# Patient Record
Sex: FEMALE | Race: WHITE | NOT HISPANIC OR LATINO | URBAN - METROPOLITAN AREA
[De-identification: names, ages, dates, MRNs, and addresses within clinical notes are randomized per-mention and may not be internally consistent; named-entity substitution may affect disease eponyms.]

---

## 2017-04-07 ENCOUNTER — OFFICE VISIT - RIVER FALLS (OUTPATIENT)
Dept: FAMILY MEDICINE | Facility: CLINIC | Age: 42
End: 2017-04-07

## 2017-04-07 ASSESSMENT — MIFFLIN-ST. JEOR: SCORE: 1394.32

## 2017-11-22 ENCOUNTER — OFFICE VISIT - RIVER FALLS (OUTPATIENT)
Dept: FAMILY MEDICINE | Facility: CLINIC | Age: 42
End: 2017-11-22

## 2017-11-22 ASSESSMENT — MIFFLIN-ST. JEOR: SCORE: 1436.05

## 2017-12-05 ENCOUNTER — OFFICE VISIT - RIVER FALLS (OUTPATIENT)
Dept: FAMILY MEDICINE | Facility: CLINIC | Age: 42
End: 2017-12-05

## 2017-12-07 LAB
CHOLEST SERPL-MCNC: 228 MG/DL
CHOLEST/HDLC SERPL: 3.7 {RATIO}
CREAT SERPL-MCNC: 0.73 MG/DL (ref 0.5–1.1)
GLUCOSE BLD-MCNC: 90 MG/DL (ref 65–99)
HDLC SERPL-MCNC: 62 MG/DL
LDLC SERPL CALC-MCNC: 133 MG/DL
NONHDLC SERPL-MCNC: 166 MG/DL
TRIGL SERPL-MCNC: 193 MG/DL

## 2018-03-29 ENCOUNTER — OFFICE VISIT - RIVER FALLS (OUTPATIENT)
Dept: FAMILY MEDICINE | Facility: CLINIC | Age: 43
End: 2018-03-29

## 2018-03-29 ASSESSMENT — MIFFLIN-ST. JEOR: SCORE: 1462.36

## 2018-07-12 ENCOUNTER — OFFICE VISIT - RIVER FALLS (OUTPATIENT)
Dept: FAMILY MEDICINE | Facility: CLINIC | Age: 43
End: 2018-07-12

## 2018-09-25 ENCOUNTER — OFFICE VISIT - RIVER FALLS (OUTPATIENT)
Dept: FAMILY MEDICINE | Facility: CLINIC | Age: 43
End: 2018-09-25

## 2018-09-26 LAB
CREAT SERPL-MCNC: 0.68 MG/DL (ref 0.5–1.1)
GLUCOSE BLD-MCNC: 105 MG/DL (ref 65–99)

## 2018-10-01 ENCOUNTER — OFFICE VISIT - RIVER FALLS (OUTPATIENT)
Dept: FAMILY MEDICINE | Facility: CLINIC | Age: 43
End: 2018-10-01

## 2018-10-01 ASSESSMENT — MIFFLIN-ST. JEOR: SCORE: 1457.82

## 2019-01-15 ENCOUNTER — OFFICE VISIT - RIVER FALLS (OUTPATIENT)
Dept: FAMILY MEDICINE | Facility: CLINIC | Age: 44
End: 2019-01-15

## 2019-03-07 ENCOUNTER — OFFICE VISIT - RIVER FALLS (OUTPATIENT)
Dept: FAMILY MEDICINE | Facility: CLINIC | Age: 44
End: 2019-03-07

## 2019-03-07 ENCOUNTER — COMMUNICATION - RIVER FALLS (OUTPATIENT)
Dept: FAMILY MEDICINE | Facility: CLINIC | Age: 44
End: 2019-03-07

## 2019-03-08 LAB
A/G RATIO - HISTORICAL: 2 (ref 1–2.5)
ALBUMIN SERPL-MCNC: 4.7 GM/DL (ref 3.6–5.1)
ALP SERPL-CCNC: 59 UNIT/L (ref 33–115)
ALT SERPL W P-5'-P-CCNC: 16 UNIT/L (ref 6–29)
AST SERPL W P-5'-P-CCNC: 20 UNIT/L (ref 10–30)
BILIRUB SERPL-MCNC: 0.6 MG/DL (ref 0.2–1.2)
BUN SERPL-MCNC: 13 MG/DL (ref 7–25)
BUN/CREAT RATIO - HISTORICAL: NORMAL (ref 6–22)
CALCIUM SERPL-MCNC: 9.9 MG/DL (ref 8.6–10.2)
CHLORIDE BLD-SCNC: 105 MMOL/L (ref 98–110)
CO2 SERPL-SCNC: 26 MMOL/L (ref 20–32)
CREAT SERPL-MCNC: 0.78 MG/DL (ref 0.5–1.1)
EGFRCR SERPLBLD CKD-EPI 2021: 94 ML/MIN/1.73M2
ERYTHROCYTE [DISTWIDTH] IN BLOOD BY AUTOMATED COUNT: 12.5 % (ref 11–15)
GLOBULIN: 2.4 (ref 1.9–3.7)
GLUCOSE BLD-MCNC: 89 MG/DL (ref 65–99)
HCT VFR BLD AUTO: 43 % (ref 35–45)
HGB BLD-MCNC: 14.6 GM/DL (ref 11.7–15.5)
MCH RBC QN AUTO: 30.2 PG (ref 27–33)
MCHC RBC AUTO-ENTMCNC: 34 GM/DL (ref 32–36)
MCV RBC AUTO: 88.8 FL (ref 80–100)
PINWORM: NORMAL
PLATELET # BLD AUTO: 269 10*3/UL (ref 140–400)
PMV BLD: 11.9 FL (ref 7.5–12.5)
POTASSIUM BLD-SCNC: 4.4 MMOL/L (ref 3.5–5.3)
PROT SERPL-MCNC: 7.1 GM/DL (ref 6.1–8.1)
RBC # BLD AUTO: 4.84 10*6/UL (ref 3.8–5.1)
SODIUM SERPL-SCNC: 140 MMOL/L (ref 135–146)
TSH SERPL DL<=0.005 MIU/L-ACNC: 2.61 MIU/L
WBC # BLD AUTO: 4.8 10*3/UL (ref 3.8–10.8)

## 2019-03-12 LAB
TRICHROME #1 - QUEST: NORMAL
TRICHROME #1 - QUEST: NORMAL

## 2019-03-14 ENCOUNTER — OFFICE VISIT - RIVER FALLS (OUTPATIENT)
Dept: FAMILY MEDICINE | Facility: CLINIC | Age: 44
End: 2019-03-14

## 2019-03-15 ENCOUNTER — AMBULATORY - RIVER FALLS (OUTPATIENT)
Dept: FAMILY MEDICINE | Facility: CLINIC | Age: 44
End: 2019-03-15

## 2019-03-17 LAB
C REACTIVE PROTEIN LHE: 1.1 MG/L
CELIAC DISEASE DUAL AG SCREEN: NORMAL
ERYTHROCYTE [SEDIMENTATION RATE] IN BLOOD BY WESTERGREN METHOD: 6 MM/H
IMMUNOGLOBULIN A: 220 MG/DL (ref 81–463)
TISSUE TRANSGLUTAMINASE IGA - HISTORICAL: 1 UNIT/ML

## 2019-03-19 LAB
CAMPYLOBACTER CULTURE: NORMAL
CLOSTRIDIUM DIFFICILE TOXIN A AND B EIA-QUEST: NORMAL
SALMONELLA/SHIGELLA SCREEN: NORMAL

## 2019-10-07 ENCOUNTER — AMBULATORY - RIVER FALLS (OUTPATIENT)
Dept: FAMILY MEDICINE | Facility: CLINIC | Age: 44
End: 2019-10-07

## 2019-10-08 ENCOUNTER — OFFICE VISIT - RIVER FALLS (OUTPATIENT)
Dept: FAMILY MEDICINE | Facility: CLINIC | Age: 44
End: 2019-10-08

## 2019-10-08 ENCOUNTER — COMMUNICATION - RIVER FALLS (OUTPATIENT)
Dept: FAMILY MEDICINE | Facility: CLINIC | Age: 44
End: 2019-10-08

## 2019-10-08 LAB
CHOLEST SERPL-MCNC: 133 MG/DL
CHOLEST/HDLC SERPL: 2.3 {RATIO}
HDLC SERPL-MCNC: 58 MG/DL
LDLC SERPL CALC-MCNC: 61 MG/DL
NONHDLC SERPL-MCNC: 75 MG/DL
TRIGL SERPL-MCNC: 48 MG/DL

## 2019-10-08 ASSESSMENT — MIFFLIN-ST. JEOR: SCORE: 1262.77

## 2019-11-08 ENCOUNTER — OFFICE VISIT - RIVER FALLS (OUTPATIENT)
Dept: FAMILY MEDICINE | Facility: CLINIC | Age: 44
End: 2019-11-08

## 2019-11-08 ASSESSMENT — MIFFLIN-ST. JEOR: SCORE: 1262.77

## 2020-02-02 ENCOUNTER — OFFICE VISIT - RIVER FALLS (OUTPATIENT)
Dept: FAMILY MEDICINE | Facility: CLINIC | Age: 45
End: 2020-02-02

## 2020-02-02 ASSESSMENT — MIFFLIN-ST. JEOR: SCORE: 1235.56

## 2020-02-18 ENCOUNTER — OFFICE VISIT - RIVER FALLS (OUTPATIENT)
Dept: FAMILY MEDICINE | Facility: CLINIC | Age: 45
End: 2020-02-18

## 2020-02-18 ASSESSMENT — MIFFLIN-ST. JEOR: SCORE: 1231.93

## 2020-02-22 ENCOUNTER — OFFICE VISIT - RIVER FALLS (OUTPATIENT)
Dept: FAMILY MEDICINE | Facility: CLINIC | Age: 45
End: 2020-02-22

## 2020-02-22 ASSESSMENT — MIFFLIN-ST. JEOR: SCORE: 1231.93

## 2020-02-26 ENCOUNTER — COMMUNICATION - RIVER FALLS (OUTPATIENT)
Dept: FAMILY MEDICINE | Facility: CLINIC | Age: 45
End: 2020-02-26

## 2020-02-26 ENCOUNTER — OFFICE VISIT - RIVER FALLS (OUTPATIENT)
Dept: FAMILY MEDICINE | Facility: CLINIC | Age: 45
End: 2020-02-26

## 2020-02-26 LAB
BASOPHILS # BLD MANUAL: 0 10*3/UL
BASOPHILS NFR BLD MANUAL: 0.3 %
D DIMER PPP FEU-MCNC: 0.63
EOSINOPHIL # BLD MANUAL: 0.1 10*3/UL
EOSINOPHIL NFR BLD MANUAL: 1.1 %
ERYTHROCYTE [DISTWIDTH] IN BLOOD BY AUTOMATED COUNT: 13 % (ref 11.5–15.5)
ERYTHROCYTE [SEDIMENTATION RATE] IN BLOOD BY WESTERGREN METHOD: 6 MM/HR
HCT VFR BLD AUTO: 40.5 % (ref 33–51)
HGB BLD-MCNC: 14.2 G/DL (ref 12–16)
LYMPHOCYTES # BLD MANUAL: 2.3 10*3/UL (ref 0.9–2.9)
LYMPHOCYTES NFR BLD MANUAL: 31.8 %
MCH RBC QN AUTO: 31.3 PG (ref 26–34)
MCHC RBC AUTO-ENTMCNC: 35.1 G/DL (ref 32–36)
MCV RBC AUTO: 89 FL (ref 80–100)
MONOCYTES # BLD MANUAL: 0.5 10*3/UL
MONOCYTES NFR BLD MANUAL: 6.7 %
NEUTROPHILS # BLD MANUAL: 4.4 10*3/UL (ref 1.7–7)
NEUTROPHILS NFR BLD MANUAL: 60.1 %
PLATELET # BLD AUTO: 247 10*3/UL (ref 140–440)
PMV BLD: 10.7 FL (ref 6.5–11)
RBC # BLD AUTO: 4.53 10*6/UL (ref 4–5.2)
WBC # BLD AUTO: 7.3 10*3/UL (ref 4.5–11)

## 2020-02-26 ASSESSMENT — MIFFLIN-ST. JEOR: SCORE: 1231.93

## 2020-03-25 ENCOUNTER — OFFICE VISIT - RIVER FALLS (OUTPATIENT)
Dept: FAMILY MEDICINE | Facility: CLINIC | Age: 45
End: 2020-03-25

## 2020-05-19 ENCOUNTER — OFFICE VISIT - RIVER FALLS (OUTPATIENT)
Dept: FAMILY MEDICINE | Facility: CLINIC | Age: 45
End: 2020-05-19

## 2020-05-19 ASSESSMENT — MIFFLIN-ST. JEOR: SCORE: 1221.95

## 2020-05-20 ENCOUNTER — COMMUNICATION - RIVER FALLS (OUTPATIENT)
Dept: FAMILY MEDICINE | Facility: CLINIC | Age: 45
End: 2020-05-20

## 2020-05-20 LAB
BUN SERPL-MCNC: 17 MG/DL (ref 7–25)
BUN/CREAT RATIO - HISTORICAL: NORMAL (ref 6–22)
CALCIUM SERPL-MCNC: 9.4 MG/DL (ref 8.6–10.2)
CHLORIDE BLD-SCNC: 108 MMOL/L (ref 98–110)
CO2 SERPL-SCNC: 26 MMOL/L (ref 20–32)
CREAT SERPL-MCNC: 0.7 MG/DL (ref 0.5–1.1)
EGFRCR SERPLBLD CKD-EPI 2021: 106 ML/MIN/1.73M2
GLUCOSE BLD-MCNC: 84 MG/DL (ref 65–99)
POTASSIUM BLD-SCNC: 4 MMOL/L (ref 3.5–5.3)
SODIUM SERPL-SCNC: 142 MMOL/L (ref 135–146)

## 2020-09-30 ENCOUNTER — OFFICE VISIT - RIVER FALLS (OUTPATIENT)
Dept: FAMILY MEDICINE | Facility: CLINIC | Age: 45
End: 2020-09-30

## 2020-09-30 ASSESSMENT — MIFFLIN-ST. JEOR: SCORE: 1220.14

## 2020-12-08 ENCOUNTER — OFFICE VISIT - RIVER FALLS (OUTPATIENT)
Dept: FAMILY MEDICINE | Facility: CLINIC | Age: 45
End: 2020-12-08

## 2021-02-24 ENCOUNTER — OFFICE VISIT - RIVER FALLS (OUTPATIENT)
Dept: FAMILY MEDICINE | Facility: CLINIC | Age: 46
End: 2021-02-24

## 2021-02-24 ASSESSMENT — MIFFLIN-ST. JEOR: SCORE: 1203.81

## 2021-05-30 ENCOUNTER — RECORDS - HEALTHEAST (OUTPATIENT)
Dept: ADMINISTRATIVE | Facility: CLINIC | Age: 46
End: 2021-05-30

## 2021-07-26 ENCOUNTER — OFFICE VISIT - RIVER FALLS (OUTPATIENT)
Dept: FAMILY MEDICINE | Facility: CLINIC | Age: 46
End: 2021-07-26
Payer: COMMERCIAL

## 2021-07-26 ASSESSMENT — MIFFLIN-ST. JEOR: SCORE: 1172.05

## 2021-07-28 ENCOUNTER — COMMUNICATION - RIVER FALLS (OUTPATIENT)
Dept: FAMILY MEDICINE | Facility: CLINIC | Age: 46
End: 2021-07-28
Payer: COMMERCIAL

## 2021-07-28 ENCOUNTER — OFFICE VISIT - RIVER FALLS (OUTPATIENT)
Dept: FAMILY MEDICINE | Facility: CLINIC | Age: 46
End: 2021-07-28
Payer: COMMERCIAL

## 2021-07-28 LAB
A/G RATIO - HISTORICAL: 2 (ref 1–2.5)
ALBUMIN SERPL-MCNC: 5 GM/DL (ref 3.6–5.1)
ALP SERPL-CCNC: 57 UNIT/L (ref 31–125)
ALT SERPL W P-5'-P-CCNC: 14 UNIT/L (ref 6–29)
AST SERPL W P-5'-P-CCNC: 25 UNIT/L (ref 10–30)
BILIRUB DIRECT SERPL-MCNC: 0.2 MG/DL
BILIRUB INDIRECT SERPL-MCNC: 0.6 MG/DL (ref 0.2–1.2)
BILIRUB SERPL-MCNC: 0.8 MG/DL (ref 0.2–1.2)
BUN SERPL-MCNC: 17 MG/DL (ref 7–25)
BUN/CREAT RATIO - HISTORICAL: ABNORMAL (ref 6–22)
C REACTIVE PROTEIN LHE: <0.2 MG/L
CALCIUM SERPL-MCNC: 10.4 MG/DL (ref 8.6–10.2)
CHLORIDE BLD-SCNC: 103 MMOL/L (ref 98–110)
CO2 SERPL-SCNC: 29 MMOL/L (ref 20–32)
CREAT SERPL-MCNC: 0.8 MG/DL (ref 0.5–1.1)
EGFRCR SERPLBLD CKD-EPI 2021: 90 ML/MIN/1.73M2
ERYTHROCYTE [DISTWIDTH] IN BLOOD BY AUTOMATED COUNT: 11.7 % (ref 11–15)
ERYTHROCYTE [SEDIMENTATION RATE] IN BLOOD BY WESTERGREN METHOD: 2 MM/H
GLOBULIN: 2.5 (ref 1.9–3.7)
GLUCOSE BLD-MCNC: 98 MG/DL (ref 65–99)
HCT VFR BLD AUTO: 40.6 % (ref 35–45)
HGB BLD-MCNC: 13.8 GM/DL (ref 11.7–15.5)
MCH RBC QN AUTO: 31.7 PG (ref 27–33)
MCHC RBC AUTO-ENTMCNC: 34 GM/DL (ref 32–36)
MCV RBC AUTO: 93.1 FL (ref 80–100)
PLATELET # BLD AUTO: 222 10*3/UL (ref 140–400)
PMV BLD: 12.3 FL (ref 7.5–12.5)
POTASSIUM BLD-SCNC: 4.6 MMOL/L (ref 3.5–5.3)
PROT SERPL-MCNC: 7.5 GM/DL (ref 6.1–8.1)
RBC # BLD AUTO: 4.36 10*6/UL (ref 3.8–5.1)
SODIUM SERPL-SCNC: 139 MMOL/L (ref 135–146)
WBC # BLD AUTO: 5.2 10*3/UL (ref 3.8–10.8)

## 2021-07-28 ASSESSMENT — MIFFLIN-ST. JEOR: SCORE: 1172.05

## 2021-11-18 ENCOUNTER — OFFICE VISIT - RIVER FALLS (OUTPATIENT)
Dept: FAMILY MEDICINE | Facility: CLINIC | Age: 46
End: 2021-11-18

## 2021-11-18 ENCOUNTER — AMBULATORY - RIVER FALLS (OUTPATIENT)
Dept: FAMILY MEDICINE | Facility: CLINIC | Age: 46
End: 2021-11-18

## 2021-11-18 ENCOUNTER — LAB REQUISITION (OUTPATIENT)
Dept: LAB | Facility: CLINIC | Age: 46
End: 2021-11-18
Payer: COMMERCIAL

## 2021-11-18 DIAGNOSIS — U07.1 COVID-19: ICD-10-CM

## 2021-11-18 PROCEDURE — U0003 INFECTIOUS AGENT DETECTION BY NUCLEIC ACID (DNA OR RNA); SEVERE ACUTE RESPIRATORY SYNDROME CORONAVIRUS 2 (SARS-COV-2) (CORONAVIRUS DISEASE [COVID-19]), AMPLIFIED PROBE TECHNIQUE, MAKING USE OF HIGH THROUGHPUT TECHNOLOGIES AS DESCRIBED BY CMS-2020-01-R: HCPCS | Mod: ORL | Performed by: FAMILY MEDICINE

## 2021-11-19 LAB — SARS-COV-2 RNA RESP QL NAA+PROBE: NEGATIVE

## 2021-11-20 ENCOUNTER — COMMUNICATION - RIVER FALLS (OUTPATIENT)
Dept: FAMILY MEDICINE | Facility: CLINIC | Age: 46
End: 2021-11-20
Payer: COMMERCIAL

## 2021-11-23 LAB — SARS-COV-2 RNA RESP QL NAA+PROBE: NEGATIVE

## 2022-02-11 VITALS
WEIGHT: 130 LBS | BODY MASS INDEX: 23.04 KG/M2 | HEIGHT: 63 IN | SYSTOLIC BLOOD PRESSURE: 125 MMHG | DIASTOLIC BLOOD PRESSURE: 74 MMHG | TEMPERATURE: 97.5 F | HEART RATE: 86 BPM

## 2022-02-11 VITALS
HEART RATE: 83 BPM | SYSTOLIC BLOOD PRESSURE: 126 MMHG | WEIGHT: 172 LBS | TEMPERATURE: 96.3 F | DIASTOLIC BLOOD PRESSURE: 84 MMHG | HEIGHT: 63 IN | BODY MASS INDEX: 30.48 KG/M2

## 2022-02-12 VITALS
HEART RATE: 64 BPM | DIASTOLIC BLOOD PRESSURE: 66 MMHG | HEIGHT: 63 IN | BODY MASS INDEX: 21.79 KG/M2 | SYSTOLIC BLOOD PRESSURE: 114 MMHG | HEIGHT: 63 IN | SYSTOLIC BLOOD PRESSURE: 126 MMHG | WEIGHT: 123 LBS | BODY MASS INDEX: 21.79 KG/M2 | RESPIRATION RATE: 16 BRPM | DIASTOLIC BLOOD PRESSURE: 72 MMHG | WEIGHT: 123 LBS | OXYGEN SATURATION: 99 % | HEART RATE: 72 BPM | TEMPERATURE: 97 F

## 2022-02-12 VITALS
WEIGHT: 162.2 LBS | HEART RATE: 87 BPM | WEIGHT: 163.2 LBS | SYSTOLIC BLOOD PRESSURE: 130 MMHG | DIASTOLIC BLOOD PRESSURE: 85 MMHG | TEMPERATURE: 97.8 F | HEART RATE: 72 BPM | DIASTOLIC BLOOD PRESSURE: 78 MMHG | TEMPERATURE: 97.2 F | SYSTOLIC BLOOD PRESSURE: 122 MMHG | BODY MASS INDEX: 28.91 KG/M2 | BODY MASS INDEX: 28.73 KG/M2

## 2022-02-12 VITALS
TEMPERATURE: 97.9 F | HEART RATE: 71 BPM | HEIGHT: 63 IN | WEIGHT: 133.6 LBS | SYSTOLIC BLOOD PRESSURE: 131 MMHG | BODY MASS INDEX: 23.67 KG/M2 | DIASTOLIC BLOOD PRESSURE: 87 MMHG

## 2022-02-12 VITALS
TEMPERATURE: 97.7 F | SYSTOLIC BLOOD PRESSURE: 111 MMHG | RESPIRATION RATE: 16 BRPM | DIASTOLIC BLOOD PRESSURE: 68 MMHG | HEART RATE: 99 BPM | BODY MASS INDEX: 23.74 KG/M2 | SYSTOLIC BLOOD PRESSURE: 108 MMHG | BODY MASS INDEX: 24.13 KG/M2 | HEART RATE: 82 BPM | HEIGHT: 63 IN | WEIGHT: 136.2 LBS | HEIGHT: 63 IN | OXYGEN SATURATION: 100 % | HEART RATE: 60 BPM | BODY MASS INDEX: 24.13 KG/M2 | WEIGHT: 136.2 LBS | TEMPERATURE: 96.5 F | HEIGHT: 63 IN | WEIGHT: 134 LBS | SYSTOLIC BLOOD PRESSURE: 134 MMHG | DIASTOLIC BLOOD PRESSURE: 70 MMHG | DIASTOLIC BLOOD PRESSURE: 71 MMHG | TEMPERATURE: 97.2 F

## 2022-02-12 VITALS
OXYGEN SATURATION: 99 % | HEIGHT: 63 IN | SYSTOLIC BLOOD PRESSURE: 142 MMHG | SYSTOLIC BLOOD PRESSURE: 130 MMHG | BODY MASS INDEX: 24.13 KG/M2 | WEIGHT: 136.2 LBS | TEMPERATURE: 97.1 F | DIASTOLIC BLOOD PRESSURE: 78 MMHG | BODY MASS INDEX: 24.27 KG/M2 | HEIGHT: 63 IN | HEART RATE: 88 BPM | DIASTOLIC BLOOD PRESSURE: 78 MMHG | RESPIRATION RATE: 20 BRPM | HEART RATE: 64 BPM | WEIGHT: 137 LBS | TEMPERATURE: 99.6 F

## 2022-02-12 VITALS
TEMPERATURE: 97.7 F | RESPIRATION RATE: 16 BRPM | SYSTOLIC BLOOD PRESSURE: 154 MMHG | BODY MASS INDEX: 25.34 KG/M2 | HEART RATE: 79 BPM | HEIGHT: 63 IN | BODY MASS INDEX: 25.34 KG/M2 | DIASTOLIC BLOOD PRESSURE: 89 MMHG | SYSTOLIC BLOOD PRESSURE: 146 MMHG | WEIGHT: 143 LBS | HEART RATE: 64 BPM | DIASTOLIC BLOOD PRESSURE: 100 MMHG | WEIGHT: 143 LBS | TEMPERATURE: 98.1 F | HEIGHT: 63 IN

## 2022-02-12 VITALS
SYSTOLIC BLOOD PRESSURE: 123 MMHG | TEMPERATURE: 97.3 F | BODY MASS INDEX: 30.04 KG/M2 | DIASTOLIC BLOOD PRESSURE: 85 MMHG | HEART RATE: 75 BPM | WEIGHT: 169.6 LBS

## 2022-02-12 VITALS
HEART RATE: 99 BPM | HEIGHT: 63 IN | SYSTOLIC BLOOD PRESSURE: 131 MMHG | DIASTOLIC BLOOD PRESSURE: 87 MMHG | BODY MASS INDEX: 32.11 KG/M2 | TEMPERATURE: 97.3 F | WEIGHT: 181.2 LBS

## 2022-02-12 VITALS
BODY MASS INDEX: 32.96 KG/M2 | TEMPERATURE: 97 F | TEMPERATURE: 97.3 F | HEART RATE: 64 BPM | BODY MASS INDEX: 33.94 KG/M2 | WEIGHT: 191.6 LBS | RESPIRATION RATE: 16 BRPM | DIASTOLIC BLOOD PRESSURE: 90 MMHG | WEIGHT: 186 LBS | DIASTOLIC BLOOD PRESSURE: 104 MMHG | HEIGHT: 63 IN | HEART RATE: 74 BPM | SYSTOLIC BLOOD PRESSURE: 140 MMHG | SYSTOLIC BLOOD PRESSURE: 158 MMHG

## 2022-02-12 VITALS
SYSTOLIC BLOOD PRESSURE: 157 MMHG | WEIGHT: 181.2 LBS | BODY MASS INDEX: 32.1 KG/M2 | HEART RATE: 86 BPM | DIASTOLIC BLOOD PRESSURE: 103 MMHG

## 2022-02-12 VITALS
RESPIRATION RATE: 16 BRPM | TEMPERATURE: 97 F | WEIGHT: 187 LBS | DIASTOLIC BLOOD PRESSURE: 100 MMHG | HEIGHT: 63 IN | OXYGEN SATURATION: 98 % | SYSTOLIC BLOOD PRESSURE: 134 MMHG | HEART RATE: 80 BPM | BODY MASS INDEX: 33.13 KG/M2

## 2022-02-12 VITALS
DIASTOLIC BLOOD PRESSURE: 83 MMHG | HEART RATE: 92 BPM | SYSTOLIC BLOOD PRESSURE: 117 MMHG | TEMPERATURE: 97.9 F | BODY MASS INDEX: 32.74 KG/M2 | WEIGHT: 184.8 LBS

## 2022-02-12 VITALS — HEIGHT: 63 IN | BODY MASS INDEX: 23.74 KG/M2

## 2022-02-16 NOTE — PROGRESS NOTES
Patient:   YESSENIA RIDDLE            MRN: [id number]            FIN: [id number]               Age:   43 years     Sex:  Female     :  1975   Associated Diagnoses:   HTN (hypertension); Family history of heart attack   Author:   Augustin Chatman MD      Visit Information      Date of Service: 2018 08:37 am  Performing Location: [facility]  Encounter#: [id number]      Primary Care Provider (PCP):  DADA JAMES      Referring Provider:  Augustin Chatman MD    NPI# [id number]      Chief Complaint   2018 8:41 AM CDT    HTN check up.  Sinus infection x3 days.        History of Present Illness   chief complaint and symptoms as noted above confirmed with patient   Strong FHX heart dis  bp now NL  former smoker  Tolerating norvasc  R max pain a few days      Review of Systems   Constitutional:  Negative except as documented in history of present illness.    Ear/Nose/Mouth/Throat:  Negative except as documented in history of present illness.    Respiratory:  Negative.    Cardiovascular:  Negative.    Gastrointestinal:  Negative.    Genitourinary:  Negative.    Musculoskeletal:  Negative.    Integumentary:  Negative.    Neurologic:  Negative.       Health Status   Allergies:    Allergic Reactions (Selected)  Severity Not Documented  Metal (No reactions were documented)  No Known Medication Allergies   Medications:  (Selected)   Prescriptions  Prescribed  amLODIPine 2.5 mg oral tablet: 1 tab(s) ( 2.5 mg ), po, daily, # 90 tab(s), 1 Refill(s), Type: Maintenance, Pharmacy: [facility] [id number], 1 tab(s) Oral daily  atorvastatin 40 mg oral tablet: 1 tab(s) ( 40 mg ), po, daily, # 90 tab(s), 1 Refill(s), Type: Maintenance, Pharmacy: [facility] [id number], 1 tab(s) Oral daily  Documented Medications  Documented  Flonase 50 mcg/inh nasal spray: 1 spray(s), nasal, daily, 0 Refill(s), Type: Maintenance  Xyzal 5 mg oral tablet: 1 tab(s) ( 5 mg ), po, qpm, 0 Refill(s), Type:  Maintenance  aspirin 81 mg oral tablet: 1 tab(s) ( 81 mg ), PO, Daily, # 30 tab(s), 0 Refill(s), Type: Maintenance   Problem list:    All Problems (Selected)  Allergic rhinitis, unspecified / SNOMED CT 914270754 / Confirmed  Pain in joint involving lower leg / SNOMED CT 165115432 / Confirmed  Back problem / SNOMED CT 790254093 / Confirmed  Gastrocnemius equinus / SNOMED CT 4768666185 / Confirmed  Disorder of sacrum / SNOMED CT 50096121 / Confirmed  Sleep disturbance / SNOMED CT 62373691 / Confirmed  Dyslipidemia / SNOMED CT 6027017156 / Confirmed  Facet syndrome / SNOMED CT 779488164 / Confirmed  Family history of heart attack / SNOMED CT 602927744 / Confirmed  Hip pain / SNOMED CT 47134116 / Confirmed  Hyperlipidemia, unspecified / SNOMED CT 45877928 / Confirmed  Lipidemia / SNOMED CT 60602082 / Confirmed  Hypertension goal BP (blood pressure) < 130/80 / SNOMED CT 0798994812 / Confirmed  Knee pain / SNOMED CT 56508765 / Confirmed  Lumbago / SNOMED CT 560524622 / Confirmed  Lumbosacral neuritis / SNOMED CT 680336736 / Confirmed  Lumbosacral spondylosis / SNOMED CT 326162574 / Confirmed  Lumbosacral spondylosis without myelopathy / SNOMED CT 65232683 / Confirmed  Myalgia / SNOMED CT 105556691 / Confirmed  Myofascial pain / SNOMED CT 860097187 / Confirmed  Neuritis / SNOMED CT 740606034 / Confirmed  Nonspecific reaction to tuberculin skin test without active tuberculosis / SNOMED CT 2804098593 / Confirmed  Obesity / SNOMED CT 7506362298 / Probable  Pain in limb / SNOMED CT 339259539 / Confirmed  Leg pain / SNOMED CT 654592213 / Confirmed  Patellar tendinitis / SNOMED CT 28770926 / Confirmed  Patellofemoral syndrome / SNOMED CT 4801967567 / Confirmed  Pes anserinus bursitis / SNOMED CT 975378744 / Confirmed  Plantar fasciitis / SNOMED CT 407274254 / Confirmed  Disturbance of skin sensation / SNOMED CT 949852946 / Confirmed  Uterine prolapse / SNOMED CT 94986002 / Confirmed      Histories   Past Medical History:     Active  Hyperlipidemia, unspecified (10379188): Onset on 7/18/2013 at 38 years.  Disorder of sacrum (77212316): Onset on 1/24/2012 at 36 years.  Lumbosacral spondylosis without myelopathy (05805023): Onset on 1/3/2012 at 36 years.  Back problem (714444895): Onset on 1/3/2012 at 36 years.  Sleep disturbance (68195122): Onset on 6/27/2011 at 36 years.  Myalgia (434335671): Onset on 6/27/2011 at 36 years.  Pain in joint involving lower leg (701858628): Onset on 1/21/2011 at 35 years.  Neuritis (992445489): Onset on 12/3/2010 at 35 years.  Comments:  12/14/2017 CST 9:40 AM Leny León  Thoracic or lumbosacral  Disturbance of skin sensation (204874517): Onset on 11/23/2010 at 35 years.  Lumbago (714194277): Onset on 9/22/2010 at 35 years.  Pain in limb (168182610): Onset on 9/7/2010 at 35 years.  Nonspecific reaction to tuberculin skin test without active tuberculosis (9558729475): Onset on 1/29/2010 at 35 years.  Allergic rhinitis, unspecified (939581727)  Patellar tendinitis (62608369)  Pes anserinus bursitis (352360848)  Knee pain (80673473)  Hip pain (74880650)  Facet syndrome (585335649)  Comments:  12/14/2017 CST 9:50 AM Leny León  Lumbar  Lumbosacral spondylosis (635768874)  Leg pain (414529877)  Patellofemoral syndrome (0130112594)  Gastrocnemius equinus (8123350025)  Dyslipidemia (2619476144)  Lumbosacral neuritis (927627055)  Myofascial pain (191129725)  Plantar fasciitis (888883807)  Uterine prolapse (38107323)  Resolved  Chronic sinusitis, unspecified (42044882): Onset on 2/16/2010 at 35 years.  Resolved.  Other Disorders of Menstruation and Other Abnormal Bleeding from Female Genital Tract (626.8):  Resolved.  Uterine Prolapse without Mention of Vaginal Wall Prolapse (618.1):  Resolved.  Pregnancy:  Resolved.  Tobacco Use Disorder (305.1):  Resolved.  Pilonidal cyst with abscess (489920923):  Resolved.  Pregnancy (891318860):  Resolved in 2004 at 28 years.  Pregnancy (490059661):  Resolved in  2005 at 29 years.  History of tobacco use (1067048401):  Resolved.  Dysfunctional uterine bleeding (28619052):  Resolved.  Pilonidal cyst with abscess (752337600):  Resolved.   Family History:    Cardiac pacemaker  Father (Jacobo)  Comments:  2017 10:17 AM - Leny Cherry  Irregular heart beat  Diabetes mellitus  Mother (Bianka, )  Heart disease  Father (Jacobo)  Comments:  2017 10:38 AM - Leny Cherry  Bypass surgery at the age of 40.    2017 10:16 AM - Leny Cherry  Irregular heart beat  Grandfather (P)  Comments:  2017 10:35 AM - Leny Cherry  Heart attack at the age of 50.  Grandmother (P) ()  Comments:  2017 10:35 AM - Leny Cherry  Heart attack at the age of 30.  Mother (Bianka, )  Comments:  2017 10:16 AM - Leny Cherry  Bypass graft  High blood pressure  Father (Jacobo)  Mother (Bianka, )  CA - Cancer  Mother (Bianka, )  MS - Multiple sclerosis  Sister  Mother (Bianka, )  Aunt (M)  Colon Polyps  Mother (Bianka, )  Kidney Disease  Mother (Bianka, )  Comments:  2017 10:37 AM - Leny Cherry  Kidney failure  Colon cancer  Grandfather (M) ()  Great Uncle (P) ()  Great Grandfather (M)     Procedure history:    Cardiac computed tomography for calcium scoring (SNOMED CT 2628867092) on 2017 at 42 Years.  Comments:  2017 1:58 PM - Shantel Pizano  Total Agatston calcium score is 8.  THR - Total hip replacement (SNOMED CT 721333919) in  at 39 Years.  Comments:  2017 10:58 AM - Onelia Espino  left  Pilonidal cyst (SNOMED CT 1547180871) on 2012 at 37 Years.  Comments:  2017 10:41 AM - Leny Cherry  Excision.  No abscess.  Nerve conduction study (SNOMED CT 67114153) on 2010 at 35 Years.  Cystoscopy (SNOMED CT 57637418) on 2009 at 34 Years.  Hysterectomy (SNOMED CT 363738807) on 2007 at 32 Years.  Comments:  2017 10:44 RONAK - Leny Cherry  Right salpingo-oophorectomy.    Social History:        Alcohol Assessment            1 drink every 3 months or more.      Tobacco Assessment            Former smoker, quit more than 30 days ago, Cigarettes, Started age 13 Years.  Stopped age 33 Years.      Employment and Education Assessment            Employed, Work/School description: CMA.            Employed, Work/School description: CMA.      Exercise and Physical Activity Assessment            Exercise frequency: 2-3 x per wk..        Physical Examination   Vital Signs   7/12/2018 8:41 AM CDT Temperature Tympanic 97.9 DegF    Peripheral Pulse Rate 92 bpm    HR Method Electronic    Systolic Blood Pressure 117 mmHg    Diastolic Blood Pressure 83 mmHg  HI    Mean Arterial Pressure 94 mmHg    BP Method Electronic      Measurements from flowsheet : Measurements   7/12/2018 8:41 AM CDT    Weight Measured - Standard                184.8 lb     General:  Alert and oriented, No acute distress.    Eye:  Normal conjunctiva.    HENT:  Tympanic membranes are clear, No pharyngeal erythema.    Neck:  Supple, Non-tender.    Respiratory:  Lungs are clear to auscultation, Respirations are non-labored.    Cardiovascular:  Normal rate, Regular rhythm.    Gastrointestinal:  Soft, Non-tender, No organomegaly.    Neurologic:  Alert, Oriented.       Health Maintenance      Recommendations     Pending (in the next year)        Due            HIV Screen (if sexually active) (Female) due  07/12/18  and every 1  year(s)           STD Counseling (if sexually active) (Female) due  07/12/18  and every 1  year(s)           Syphilis Screen (if sexually active) (Female) due  07/12/18  and every 1  year(s)           Type 2 Diabetes Mellitus Screen (Female) due  07/12/18  Variable frequency        Due In Future            Alcohol Misuse Screen (Female) not due until  11/22/18  and every 1  year(s)           Depression Screen (Female) not due until  11/22/18  and every 1  year(s)           Lipid Disorders Screen (Female) not  due until  12/05/18  and every 1  year(s)           Body Mass Index Check (Female) not due until  03/29/19  and every 1  year(s)     Satisfied (in the past 1 year)        Satisfied            Alcohol Misuse Screen (Female) on  11/22/17.           Body Mass Index Check (Female) on  03/29/18.           Body Mass Index Check (Female) on  11/22/17.           Depression Screen (Female) on  11/22/17.           Depression Screen (Female) on  11/22/17.           Depression Screen (Female) on  11/22/17.           High Blood Pressure Screen (Female) on  07/12/18.           High Blood Pressure Screen (Female) on  03/29/18.           High Blood Pressure Screen (Female) on  12/05/17.           High Blood Pressure Screen (Female) on  12/05/17.           High Blood Pressure Screen (Female) on  11/22/17.           Lipid Disorders Screen (Female) on  12/05/17.           Lipid Disorders Screen (Female) on  12/05/17.           Lipid Disorders Screen (Female) on  12/05/17.           Lipid Disorders Screen (Female) on  12/05/17.           Obesity Screen and Counseling (Female) on  07/12/18.           Obesity Screen and Counseling (Female) on  03/29/18.           Obesity Screen and Counseling (Female) on  12/05/17.           Obesity Screen and Counseling (Female) on  11/22/17.           Tobacco Use Screen (Female) on  07/12/18.           Tobacco Use Screen (Female) on  03/29/18.           Tobacco Use Screen (Female) on  11/22/17.        Impression and Plan   Diagnosis     HTN (hypertension) (AVR29-YH I10).     Family history of heart attack (YHY84-BE Z82.49).     Course:  Improving.    Plan:  cont asa 81 and lipitor  no change amlodipine for now  bp monthly  rech 6 mo  flonase  .    Patient Instructions:       Counseled: Patient, Regarding diagnosis, Regarding treatment, Regarding medications, Diet, Activity.

## 2022-02-16 NOTE — TELEPHONE ENCOUNTER
Entered by Dallas Rodriges CMA on January 29, 2019 11:54:05 AM CST  PCP:   CORN[facility]    Medication:   ondansetron 8mg tabs  Last Filled:  1-21-19    Quantity:  12  Refills:  0    Date of last office visit and reason:   1-15-19 abd pain with TFS    Date of last Med Check / Px:     Date of last labs pertaining to condition:      Note:  Please advise on refills.  Dallas Rodriges CMA    Return to Clinic order placed?  no    Resource:   _  Phone:   _  Fax:  _          ------------------------------------------  From: [facility] [id number]  To: Roberto Pérez PA-C  Sent: January 29, 2019 11:21:07 AM CST  Subject: Medication Management  Due: January 30, 2019 11:21:07 AM CST    ** On Hold Pending Signature **  Drug: ondansetron (ondansetron 8 mg oral tablet, disintegrating)  1 TAB(S) ORAL Q8 HRS,PRN:FOR NAUSEA/VOMITING  Quantity: 12 tab(s)     Days Supply: 0         Refills: 0  Substitutions Allowed  Notes from Pharmacy:     Dispensed Drug: ondansetron (ondansetron 8 mg oral tablet, disintegrating)  DISSOLVE 1 TABLET ON THE TONGUE EVERY 8 HOURS AS NEEDED FOR NAUSEA OR VOMITING  Quantity: 12 tab(s)     Days Supply: 4         Refills: 0  Substitutions Allowed  Notes from Pharmacy:   ---------------------------------------------------------------  From: Dallas Rodriges CMA (eRx Pool ([id number]_[facility]))   To: Augustin Chatman MD;     Sent: 1/29/2019 11:54:15 AM CST  Subject: FW: Medication Management   Due Date/Time: 1/30/2019 11:21:00 AM CST---------------------  From: Augustin Chatman MD   To: [facility] [id number]    Sent: 1/29/2019 1:39:07 PM CST  Subject: FW: Medication Management     ** Submitted: **  Complete:ondansetron (Zofran ODT 8 mg oral tablet, disintegrating)   Signed by Augustin Chatman MD  1/29/2019 1:39:00 PM    ** Approved **  ondansetron (ONDANSETRON ODT 8MG TABLETS)  DISSOLVE 1 TABLET ON THE TONGUE EVERY 8 HOURS AS NEEDED FOR NAUSEA OR VOMITING  Qty:  12 tab(s)        Days Supply:  4         Refills:  0          YOHANNES     Route To Pharmacy - [facility] [id number]   Signed by Augustin Chatman MD

## 2022-02-16 NOTE — TELEPHONE ENCOUNTER
---------------------  From: Meredith Kaminski LPN (Phone Messages Pool ([id number]_[facility]))   To: TFS Message Pool ([id number]_[place] [facility] [place]);     Sent: 7/28/2021 8:25:42 AM CDT  Subject: update/pain med request     FYI    Phone Message    PCP:   CORNEL      Time of Call:  7:39am       Person Calling:  pt  Phone number:  [phone]    Returned call at: 8:20am    Note:   Pt LM stating she had seen [facility] because TFS was gone a few days ago. Pt says she ended up at [facility] because she was having bad head, neck and shoulder pain.    Pt says she was diagnosed with a meningioma. Pt says she has an upcoming appt with neurosurgeon to have it removed. Pt says when she was released she was asked if she wanted any pain medication and she said no- pt says now she is struggling and hoping to get something for the pain.    Pt asking if TFS can prescribe something for her.    Returned call and informed pt she would need appt to discuss pain medication. Pt transferred to scheduling.    Last office visit and reason:  7/26/21 giant cell arteritis with DWGpt's appt completed

## 2022-02-16 NOTE — PROGRESS NOTES
Patient:   YESSENIA RIDDLE            MRN: [id number]            FIN: [id number]               Age:   45 years     Sex:  Female     :  1975   Associated Diagnoses:   HTN (hypertension); Family history of heart attack; Hyperlipidemia, unspecified; Myofascial pain   Author:   Augustin Chatman MD      Visit Information      Date of Service: 2020 10:16 am  Performing Location: [facility]  Encounter#: [id number]      Primary Care Provider (PCP):  Augustin Chatman MD    NPI# [id number]      Referring Provider:  Augustin Chatman MD# [id number]      Chief Complaint   2020 10:23 AM CDT   Pt here for chronic disease visit for HTN and cholesterol management.  Pt is fasting today.        History of Present Illness   chief complaint and symptoms as noted above confirmed with patient   Patient here for six-month follow-up overall she has been doing well.  On medications as directed for her hypertension hyperlipidemia and her chronic neck pain.  Her gabapentin is been working well she takes it 3 times a day and uses her muscle relaxant as needed.  She is kept her 50 pounds off which she is been very pleased with.         Review of Systems   Constitutional:  Negative except as documented in history of present illness.    Ear/Nose/Mouth/Throat:  Negative.    Respiratory:  Negative.    Cardiovascular:  Negative.    Gastrointestinal:  Negative.    Genitourinary:  Negative.    Musculoskeletal:  Negative.    Integumentary:  Negative.    Neurologic:  Negative.       Health Status   Allergies:    Allergic Reactions (Selected)  Severity Not Documented  Metal (No reactions were documented)  No Known Medication Allergies   Medications:  (Selected)   Prescriptions  Prescribed  amLODIPine 5 mg oral tablet: = 1 tab(s) ( 5 mg ), Oral, daily, # 90 tab(s), 1 Refill(s), Type: Maintenance, Pharmacy: [id number], 1 tab(s) Oral daily  atorvastatin 40 mg oral tablet: = 1 tab(s), Oral, daily, # 90  tab(s), 0 Refill(s), Type: Maintenance, Pharmacy: [facility] #[id number]  gabapentin 300 mg oral capsule: = 1 cap(s) ( 300 mg ), Oral, tid, Instructions: take 1 po qhs day 1  then 1 po bid day 2  and then 1 po tid, # 90 cap(s), 1 Refill(s), Type: Maintenance, Pharmacy: [facility] #[id number], 1 cap(s) Oral tid,Instr:take 1 po qhs day 1; then 1 po bid...  tiZANidine 4 mg oral tablet: 2 tab(s), Oral, q8 hrs, PRN: AS NEEDED FOR MUSCLE PAIN, # 60 tab(s), Type: Soft Stop, Pharmacy: [facility] #[id number]  Documented Medications  Documented  Fish Oil 1000 mg oral capsule: = 1 cap(s) ( 1,000 mg ), Oral, daily, 0 Refill(s), Type: Maintenance  aspirin 81 mg oral tablet: 1 tab(s) ( 81 mg ), PO, Daily, # 30 tab(s), 0 Refill(s), Type: Maintenance,    Medications          *denotes recorded medication          amLODIPine 5 mg oral tablet: 5 mg, 1 tab(s), Oral, daily, 90 tab(s), 1 Refill(s).          *aspirin 81 mg oral tablet: 81 mg, 1 tab(s), PO, Daily, 30 tab(s), 0 Refill(s).          atorvastatin 40 mg oral tablet: 1 tab(s), Oral, daily, 90 tab(s), 0 Refill(s).          gabapentin 300 mg oral capsule: 300 mg, 1 cap(s), Oral, tid, take 1 po qhs day 1  then 1 po bid day 2  and then 1 po tid, 90 cap(s), 1 Refill(s).          *Fish Oil 1000 mg oral capsule: 1,000 mg, 1 cap(s), Oral, daily, 0 Refill(s).          tiZANidine 4 mg oral tablet: 2 tab(s), Oral, q8 hrs, PRN: AS NEEDED FOR MUSCLE PAIN, 60 tab(s).       Problem list:    All Problems (Selected)  Abdominal pain / SNOMED CT 20815510 / Confirmed  Allergic rhinitis, unspecified / SNOMED CT 742081317 / Confirmed  Gastrocnemius equinus / SNOMED CT 2876664333 / Confirmed  Sleep disturbance / SNOMED CT 87407605 / Confirmed  Family history of heart attack / SNOMED CT 678501997 / Confirmed  GERD without esophagitis / SNOMED CT 8073642917 / Confirmed  Hyperlipidemia, unspecified / SNOMED CT 36272742 / Confirmed  Hypertension goal BP (blood pressure) < 130/80 / SNOMED CT  7424490952 / Confirmed  Lumbosacral neuritis / SNOMED CT 657494676 / Confirmed  Lumbosacral spondylosis without myelopathy / SNOMED CT 67466322 / Confirmed  Myofascial pain / SNOMED CT 067001464 / Confirmed  Nonspecific reaction to tuberculin skin test without active tuberculosis / SNOMED CT 8247669512 / Confirmed  Obesity / SNOMED CT 1398576092 / Probable      Histories   Past Medical History:    Active  Hyperlipidemia, unspecified (17104740): Onset on 7/18/2013 at 38 years.  Lumbosacral spondylosis without myelopathy (96471596): Onset on 1/3/2012 at 36 years.  Sleep disturbance (16166408): Onset on 6/27/2011 at 36 years.  Nonspecific reaction to tuberculin skin test without active tuberculosis (6983788280): Onset on 1/29/2010 at 35 years.  Allergic rhinitis, unspecified (217640733)  Gastrocnemius equinus (1605273498)  Lumbosacral neuritis (221894475)  Myofascial pain (505026905)  Resolved  Disorder of sacrum (51420967): Onset on 1/24/2012 at 36 years.  Resolved.  Back problem (540843293): Onset on 1/3/2012 at 36 years.  Resolved.  Myalgia (563170972): Onset on 6/27/2011 at 36 years.  Resolved.  Pain in joint involving lower leg (732955299): Onset on 1/21/2011 at 35 years.  Resolved.  Neuritis (682269221): Onset on 12/3/2010 at 35 years.  Resolved.  Comments:  12/14/2017 CST 9:40 AM CST - Leny Cherry  Thoracic or lumbosacral  Disturbance of skin sensation (626402225): Onset on 11/23/2010 at 35 years.  Resolved.  Lumbago (662534640): Onset on 9/22/2010 at 35 years.  Resolved.  Pain in limb (178268242): Onset on 9/7/2010 at 35 years.  Resolved.  Chronic sinusitis, unspecified (35862504): Onset on 2/16/2010 at 35 years.  Resolved.  Other Disorders of Menstruation and Other Abnormal Bleeding from Female Genital Tract (626.8):  Resolved.  Uterine Prolapse without Mention of Vaginal Wall Prolapse (618.1):  Resolved.  Pregnancy:  Resolved.  Tobacco Use Disorder (305.1):  Resolved.  Patellar tendinitis (88860610):   Resolved.  Pes anserinus bursitis (225277462):  Resolved.  Pilonidal cyst with abscess (878442900):  Resolved.  Pregnancy (960777734):  Resolved in  at 28 years.  Pregnancy (329897441):  Resolved in  at 29 years.  Lipidemia (99890596):  Resolved.  Knee pain (90135813):  Resolved.  Hip pain (90653790):  Resolved on 3/7/2019 at 44 years.  Facet syndrome (876138384):  Resolved.  Comments:  2017 CST 9:50 AM CST - Leny Cherry  Lumbar  Lumbosacral spondylosis (364608068):  Resolved.  Leg pain (674352905):  Resolved.  Patellofemoral syndrome (5501274533):  Resolved.  Dyslipidemia (7899542816):  Resolved.  Plantar fasciitis (651060391):  Resolved.  History of tobacco use (5657604651):  Resolved.  Dysfunctional uterine bleeding (50193803):  Resolved.  Pilonidal cyst with abscess (495271315):  Resolved.  Uterine prolapse (05915305):  Resolved.  H. pylori infection (7332780710):  Resolved.   Family History:    Cardiac pacemaker  Father (Jacobo)  Comments:  2017 10:17 AM CST - Leny Cherry  Irregular heart beat  Diabetes mellitus  Mother (Bianka, )  Heart disease  Father (Jacobo)  Comments:  2017 10:38 AM CST Leny Francisco  Bypass surgery at the age of 40.    2017 10:16 AM CST - Leny Cherry  Irregular heart beat  Grandfather (P)  Comments:  2017 10:35 AM CST - Leny Cherry  Heart attack at the age of 50.  Grandmother (P) ()  Comments:  2017 10:35 AM CST Leny Francisco  Heart attack at the age of 30.  Mother (Bianka, )  Comments:  2017 10:16 AM CST Dotty Franciscoe  Bypass graft  High blood pressure  Father (Jacobo)  Mother (Bianka, )  CA - Cancer  Mother (Bianka, )  MS - Multiple sclerosis  Sister  Mother (Bianka, )  Aunt (M)  Colon Polyps  Mother (Bianka, )  Kidney Disease  Mother (Bianka, )  Comments:  2017 10:37 AM NIKKIE - Leny Cherry  Kidney failure  Colon cancer  Grandfather (M) ()  Great Uncle (P)  ()  Great Grandfather (M)     Procedure history:    Cardiac computed tomography for calcium scoring (SNOMED CT 6916338250) on 2017 at 42 Years.  Comments:  2017 1:58 PM CST - Shantel Pizano  Total Agatston calcium score is 8.  THR - Total hip replacement (SNOMED CT 480975810) in  at 39 Years.  Comments:  2017 10:58 AM CST - [place]  Onelia  left  Pilonidal cyst (SNOMED CT 7573666095) on 2012 at 37 Years.  Comments:  2017 10:41 AM CST - Leny Cherry  Excision.  No abscess.  Nerve conduction study (SNOMED CT 35116288) on 2010 at 35 Years.  Cystoscopy (SNOMED CT 56835117) on 2009 at 34 Years.  Hysterectomy (SNOMED CT 177510865) on 2007 at 32 Years.  Comments:  2017 10:44 AM NIKKIE - Leny Cherry  Right salpingo-oophorectomy.   Social History:        Alcohol Assessment            1 drink every 3 months or more.      Tobacco Assessment            Former smoker, quit more than 30 days ago, Cigarettes, Started age 13 Years.  Stopped age 33 Years.      Employment and Education Assessment            Employed, Work/School description: [employer].            Employed, Work/School description: [employer].      Exercise and Physical Activity Assessment            Exercise frequency: 2-3 x per wk..        Physical Examination   Vital Signs   2020 10:23 AM CDT Temperature Tympanic 96.5 DegF  LOW    Peripheral Pulse Rate 60 bpm    Pulse Site Radial artery    Respiratory Rate 16 br/min    Systolic Blood Pressure 108 mmHg    Diastolic Blood Pressure 68 mmHg    Mean Arterial Pressure 81 mmHg    BP Site Right arm      Measurements from flowsheet : Measurements   2020 10:23 AM CDT Height Measured - Standard 63 in    Weight Measured - Standard 134 lb    BSA 1.64 m2    Body Mass Index 23.73 kg/m2      General:  Alert and oriented, No acute distress.    Neck:  Supple, Non-tender.    Respiratory:  Lungs are clear to auscultation, Respirations are non-labored.    Cardiovascular:  Normal  rate, Regular rhythm.    Gastrointestinal:  Soft, Non-tender, No organomegaly.    Neurologic:  Alert, Oriented.       Health Maintenance      Recommendations     Pending (in the next year)        Due            Type 2 Diabetes Mellitus Screen (Female) due  05/19/20  Variable frequency        Due In Future            Influenza Vaccine not due until  08/31/20  and every 1  year(s)           Lipid Disorders Screen (Female) not due until  10/07/20  and every 1  year(s)     Satisfied (in the past 1 year)        Satisfied            Alcohol Misuse Screen (Female) on  05/19/20.           Body Mass Index Check (Female) on  05/19/20.           Body Mass Index Check (Female) on  02/26/20.           Body Mass Index Check (Female) on  02/22/20.           Body Mass Index Check (Female) on  02/18/20.           Body Mass Index Check (Female) on  02/02/20.           Body Mass Index Check (Female) on  11/08/19.           Body Mass Index Check (Female) on  10/08/19.           Depression Screen (Female) on  05/19/20.           Depression Screen (Female) on  05/19/20.           Depression Screen (Female) on  05/19/20.           High Blood Pressure Screen (Female) on  05/19/20.           High Blood Pressure Screen (Female) on  02/26/20.           High Blood Pressure Screen (Female) on  02/22/20.           High Blood Pressure Screen (Female) on  02/18/20.           High Blood Pressure Screen (Female) on  02/02/20.           High Blood Pressure Screen (Female) on  11/08/19.           High Blood Pressure Screen (Female) on  10/08/19.           Influenza Vaccine on  10/08/19.           Lipid Disorders Screen (Female) on  10/07/19.           Lipid Disorders Screen (Female) on  10/07/19.           Lipid Disorders Screen (Female) on  10/07/19.           Lipid Disorders Screen (Female) on  10/07/19.           Obesity Screen and Counseling (Female) on  05/19/20.           Obesity Screen and Counseling (Female) on  02/26/20.           Obesity  Screen and Counseling (Female) on  02/22/20.           Obesity Screen and Counseling (Female) on  02/18/20.           Obesity Screen and Counseling (Female) on  02/02/20.           Obesity Screen and Counseling (Female) on  11/08/19.           Obesity Screen and Counseling (Female) on  10/08/19.           Tobacco Use Screen (Female) on  05/19/20.           Tobacco Use Screen (Female) on  02/18/20.           Tobacco Use Screen (Female) on  11/08/19.           Tobacco Use Screen (Female) on  10/08/19.        Canceled            HIV Screen (if sexually active) (Female) on  05/18/20.           STD Counseling (If sexually active) (Female) on  05/18/20.           Syphilis Screen (if sexually active) (Female) on  05/18/20.          Impression and Plan   Diagnosis     HTN (hypertension) (LNT94-NK I10).     Family history of heart attack (FEH81-IX Z82.49).     Hyperlipidemia, unspecified (NBM52-MS E78.5).     Myofascial pain (NCX54-TN M79.18).     Course:  Progressing as expected.    Plan:  Hypertension controlled amlodipine.  Her chronic neck and myofascial pain is controlled with gabapentin and occasional muscle relaxant.  Lipids are clear controlled with atorvastatin.  Continue to be active continue with working on her weight.  We did review the current pandemic she will see his back in 6 months    rech 6 mo   .    Patient Instructions:       Counseled: Patient, Regarding diagnosis, Regarding treatment, Regarding medications, Diet, Activity.

## 2022-02-16 NOTE — NURSING NOTE
Comprehensive Intake Entered On:  5/19/2020 10:29 AM CDT    Performed On:  5/19/2020 10:23 AM CDT by Dallas Rodriges CMA               Summary   Chief Complaint :   Pt here for chronic disease visit for HTN and cholesterol management.  Pt is fasting today.   Weight Measured :   134 lb(Converted to: 134 lb 0 oz, 60.78 kg)    Height Measured :   63 in(Converted to: 5 ft 3 in, 160.02 cm)    Body Mass Index :   23.73 kg/m2   Body Surface Area :   1.64 m2   Systolic Blood Pressure :   108 mmHg   Diastolic Blood Pressure :   68 mmHg   Mean Arterial Pressure :   81 mmHg   Peripheral Pulse Rate :   60 bpm   BP Site :   Right arm   Pulse Site :   Radial artery   Temperature Tympanic :   96.5 DegF(Converted to: 35.8 DegC)  (LOW)    Respiratory Rate :   16 br/min   Dallas Rodriges CMA - 5/19/2020 10:23 AM CDT   Health Status   Allergies Verified? :   Yes   Medication History Verified? :   Yes   Medical History Verified? :   Yes   Pre-Visit Planning Status :   Completed   Tobacco Use? :   Former smoker   Dallas Rodriges CMA - 5/19/2020 10:23 AM CDT   Meds / Allergies   (As Of: 5/19/2020 10:29:20 AM CDT)   Allergies (Active)   Metal  Estimated Onset Date:   Unspecified ; Created By:   Leny Cherry; Reaction Status:   Active ; Category:   Other ; Substance:   Metal ; Type:   Allergy ; Updated By:   Leny Cherry; Reviewed Date:   5/19/2020 10:26 AM CDT      No Known Medication Allergies  Estimated Onset Date:   Unspecified ; Created By:   Rere Case; Reaction Status:   Active ; Category:   Drug ; Substance:   No Known Medication Allergies ; Type:   Allergy ; Updated By:   Rere Case; Reviewed Date:   5/19/2020 10:26 AM CDT        Medication List   (As Of: 5/19/2020 10:29:20 AM CDT)   Prescription/Discharge Order    predniSONE  :   predniSONE ; Status:   Processing ; Ordered As Mnemonic:   predniSONE 10 mg oral tablet ; Ordering Provider:   Augustin Chatman MD; Action Display:   Complete ; Catalog Code:   predniSONE ;  Order Dt/Tm:   5/19/2020 10:26:04 AM CDT          tiZANidine 4 mg oral tablet  :   tiZANidine 4 mg oral tablet ; Status:   Prescribed ; Ordered As Mnemonic:   tiZANidine 4 mg oral tablet ; Simple Display Line:   2 tab(s), Oral, q8 hrs, PRN: AS NEEDED FOR MUSCLE PAIN, 60 tab(s) ; Ordering Provider:   Roberto Pérez PA-C; Catalog Code:   tiZANidine ; Order Dt/Tm:   5/12/2020 9:52:14 AM CDT          gabapentin  :   gabapentin ; Status:   Prescribed ; Ordered As Mnemonic:   gabapentin 300 mg oral capsule ; Simple Display Line:   300 mg, 1 cap(s), Oral, tid, take 1 po qhs day 1  then 1 po bid day 2  and then 1 po tid, 90 cap(s), 1 Refill(s) ; Ordering Provider:   Roberto Pérez PA-C; Catalog Code:   gabapentin ; Order Dt/Tm:   3/25/2020 2:56:28 PM CDT          atorvastatin  :   atorvastatin ; Status:   Prescribed ; Ordered As Mnemonic:   atorvastatin 40 mg oral tablet ; Simple Display Line:   1 tab(s), Oral, daily, 90 tab(s), 0 Refill(s) ; Ordering Provider:   Augustin Chatman MD; Catalog Code:   atorvastatin ; Order Dt/Tm:   3/19/2020 7:48:35 AM CDT          amLODIPine  :   amLODIPine ; Status:   Prescribed ; Ordered As Mnemonic:   amLODIPine 5 mg oral tablet ; Simple Display Line:   5 mg, 1 tab(s), Oral, daily, 90 tab(s), 1 Refill(s) ; Ordering Provider:   Augustin Chatman MD; Catalog Code:   amLODIPine ; Order Dt/Tm:   10/8/2019 5:03:21 PM CDT            Home Meds    omega-3 polyunsaturated fatty acids  :   omega-3 polyunsaturated fatty acids ; Status:   Documented ; Ordered As Mnemonic:   Fish Oil 1000 mg oral capsule ; Simple Display Line:   1,000 mg, 1 cap(s), Oral, daily, 0 Refill(s) ; Catalog Code:   omega-3 polyunsaturated fatty acids ; Order Dt/Tm:   9/25/2018 5:25:20 PM CDT          aspirin  :   aspirin ; Status:   Documented ; Ordered As Mnemonic:   aspirin 81 mg oral tablet ; Simple Display Line:   81 mg, 1 tab(s), PO, Daily, 30 tab(s), 0 Refill(s) ; Catalog Code:   aspirin ; Order Dt/Tm:   7/12/2018  8:41:06 AM CDT            ID Risk Screen   Recent Travel History :   No recent travel   Family Member Travel History :   No recent travel   Other Exposure to Infectious Disease :   Unknown   Dallas Rodriges CMA - 5/19/2020 10:23 AM CDT   Social History   Social History   (As Of: 5/19/2020 10:29:20 AM CDT)   Alcohol:        1 drink every 3 months or more.   (Last Updated: 11/24/2017 11:00:46 AM CST by Onelia Espino)          Tobacco:        Former smoker, quit more than 30 days ago, Cigarettes, Started age 13 Years.  Stopped age 33 Years.   (Last Updated: 11/22/2017 2:19:46 PM CST by Rere Case)          Employment/School:        Employed, Work/School description: [employer].   (Last Updated: 11/24/2017 10:50:50 AM CST by Onelia Espino)   Employed, Work/School description: [employer].   (Last Updated: 11/24/2017 10:55:17 AM CST by Onelia Espino)          Exercise:        Exercise frequency: 2-3 x per wk..   (Last Updated: 11/24/2017 11:01:10 AM CST by Onelia Espino)

## 2022-02-16 NOTE — PROGRESS NOTES
"   Patient:   YESSENIA RIDDLE            MRN: [id number]            FIN: [id number]               Age:   44 years     Sex:  Female     :  1975   Associated Diagnoses:   Rhomboid muscle strain; Strain of left trapezius muscle   Author:   Roberto Pérez PA-C      Chief Complaint   2019 4:40 PM CST    Pt here for pain \"across both shoulder\" that radiates into neck and head x 2 weeks.  Pt states the pain is getting worse in left shoulder and into neck x 3 days.      History of Present Illness   Chief complaint and symptoms noted above and confirmed with patient   as above, no acute trauma or injury  pain started at base of neck on left side, now radiating into head causing headaches and into left shoulder, pain under shoulder blade  now having pain with head rotation  pain coming down into left upper arm  has tried ibuprofen and excedrin, and icy hot patch  has tried massage without relief           Review of Systems   Constitutional:  Negative.    Ear/Nose/Mouth/Throat:  Negative.    Respiratory:  Negative.       Health Status   Allergies:    Allergic Reactions (All)  Severity Not Documented  Metal (No reactions were documented)  No Known Medication Allergies  Canceled/Inactive Reactions (All)  Severity Not Documented  No known allergies (No reactions were documented)   Medications:  (Selected)   Prescriptions  Prescribed  amLODIPine 5 mg oral tablet: = 1 tab(s) ( 5 mg ), Oral, daily, # 90 tab(s), 1 Refill(s), Type: Maintenance, Pharmacy: [facility] #[id number], 1 tab(s) Oral daily  atorvastatin 40 mg oral tablet: = 1 tab(s), Oral, daily, # 90 tab(s), 1 Refill(s), Type: Maintenance, Pharmacy: [facility] #[id number], 1 tab(s) Oral daily  Documented Medications  Documented  Fish Oil 1000 mg oral capsule: = 1 cap(s) ( 1,000 mg ), Oral, daily, 0 Refill(s), Type: Maintenance  aspirin 81 mg oral tablet: 1 tab(s) ( 81 mg ), PO, Daily, # 30 tab(s), 0 Refill(s), Type: Maintenance   Problem list:    All Problems " (Selected)  Allergic rhinitis, unspecified / SNOMED CT 316732068 / Confirmed  Lumbosacral neuritis / SNOMED CT 410685241 / Confirmed  Hypertension goal BP (blood pressure) < 130/80 / SNOMED CT 0687973158 / Confirmed  Obesity / SNOMED CT 0007086192 / Probable  GERD without esophagitis / SNOMED CT 7779860392 / Confirmed  Nonspecific reaction to tuberculin skin test without active tuberculosis / SNOMED CT 3486606636 / Confirmed  Gastrocnemius equinus / SNOMED CT 0814511032 / Confirmed  Abdominal pain / SNOMED CT 96909881 / Confirmed  Family history of heart attack / SNOMED CT 466307267 / Confirmed  Myofascial pain / SNOMED CT 724924149 / Confirmed  Lumbosacral spondylosis without myelopathy / SNOMED CT 12349758 / Confirmed  Sleep disturbance / SNOMED CT 25628458 / Confirmed  Hyperlipidemia, unspecified / SNOMED CT 81695624 / Confirmed      Histories   Past Medical History:    Active  Hyperlipidemia, unspecified (30058446): Onset on 7/18/2013 at 38 years.  Lumbosacral spondylosis without myelopathy (36742876): Onset on 1/3/2012 at 36 years.  Sleep disturbance (07574181): Onset on 6/27/2011 at 36 years.  Nonspecific reaction to tuberculin skin test without active tuberculosis (6131828082): Onset on 1/29/2010 at 35 years.  Allergic rhinitis, unspecified (516654948)  Gastrocnemius equinus (8237495728)  Lumbosacral neuritis (059297214)  Myofascial pain (513015708)  Resolved  Disorder of sacrum (15562440): Onset on 1/24/2012 at 36 years.  Resolved.  Back problem (341058736): Onset on 1/3/2012 at 36 years.  Resolved.  Myalgia (414151798): Onset on 6/27/2011 at 36 years.  Resolved.  Pain in joint involving lower leg (389641546): Onset on 1/21/2011 at 35 years.  Resolved.  Neuritis (950073531): Onset on 12/3/2010 at 35 years.  Resolved.  Comments:  12/14/2017 CST 9:40 AM CST - Leny Cherry  Thoracic or lumbosacral  Disturbance of skin sensation (657575473): Onset on 11/23/2010 at 35 years.  Resolved.  Lumbago (284830844): Onset  on 2010 at 35 years.  Resolved.  Pain in limb (360000987): Onset on 2010 at 35 years.  Resolved.  Chronic sinusitis, unspecified (83239116): Onset on 2010 at 35 years.  Resolved.  Other Disorders of Menstruation and Other Abnormal Bleeding from Female Genital Tract (626.8):  Resolved.  Uterine Prolapse without Mention of Vaginal Wall Prolapse (618.1):  Resolved.  Pregnancy:  Resolved.  Tobacco Use Disorder (305.1):  Resolved.  Patellar tendinitis (69225684):  Resolved.  Pes anserinus bursitis (938776283):  Resolved.  Pilonidal cyst with abscess (240528277):  Resolved.  Pregnancy (041681772):  Resolved in  at 28 years.  Pregnancy (593728795):  Resolved in  at 29 years.  Lipidemia (76447621):  Resolved.  Knee pain (96273762):  Resolved.  Hip pain (79926912):  Resolved on 3/7/2019 at 44 years.  Facet syndrome (834183246):  Resolved.  Comments:  2017 CST 9:50 AM Leny León  Lumbar  Lumbosacral spondylosis (523455400):  Resolved.  Leg pain (319170896):  Resolved.  Patellofemoral syndrome (5017133208):  Resolved.  Dyslipidemia (4323674215):  Resolved.  Plantar fasciitis (204534936):  Resolved.  History of tobacco use (2782622109):  Resolved.  Dysfunctional uterine bleeding (60188011):  Resolved.  Pilonidal cyst with abscess (840138468):  Resolved.  Uterine prolapse (51032838):  Resolved.  H. pylori infection (5949894229):  Resolved.   Family History:    Cardiac pacemaker  Father (Jacobo)  Comments:  2017 10:17 AM Leny León  Irregular heart beat  Diabetes mellitus  Mother (Bianka, )  Heart disease  Father (Jacobo)  Comments:  2017 10:38 AM Leny León  Bypass surgery at the age of 40.    2017 10:16 AM Leny León  Irregular heart beat  Grandfather (P)  Comments:  2017 10:35 AM Leny León  Heart attack at the age of 50.  Grandmother (P) ()  Comments:  2017 10:35 AM CST - Leny Cherry  Heart attack at the age of  30.  Mother (Bianka, )  Comments:  2017 10:16 AM CST - Leny Cherry  Bypass graft  High blood pressure  Father (Jacobo)  Mother (Bianka, )  CA - Cancer  Mother (Bianka, )  MS - Multiple sclerosis  Sister  Mother (Bianka, )  Aunt (M)  Colon Polyps  Mother (Bianka, )  Kidney Disease  Mother (Bianka, )  Comments:  2017 10:37 AM NIKKIE - Leny Cherry  Kidney failure  Colon cancer  Grandfather (M) ()  Great Uncle (P) ()  Great Grandfather (M)     Procedure history:    Cardiac computed tomography for calcium scoring (1605613189) on 2017 at 42 Years.  Comments:  2017 1:58 PM NIKKIE - Shantel Pizano  Total Agatston calcium score is 8.  THR - Total hip replacement (671124107) in  at 39 Years.  Comments:  2017 10:58 AM NIKKIE - Onelia Espino  left  Pilonidal cyst (5033381881) on 2012 at 37 Years.  Comments:  2017 10:41 AM NIKKIE - Leny Cherry  Excision.  No abscess.  Nerve conduction study (44654797) on 2010 at 35 Years.  Cystoscopy (62476285) on 2009 at 34 Years.  Hysterectomy (329074503) on 2007 at 32 Years.  Comments:  2017 10:44 AM Leny León  Right salpingo-oophorectomy.      Physical Examination   Vital Signs   2019 4:40 PM CST Temperature Tympanic 98.1 DegF    Peripheral Pulse Rate 64 bpm    Pulse Site Radial artery    Respiratory Rate 16 br/min    Systolic Blood Pressure 154 mmHg  HI    Diastolic Blood Pressure 100 mmHg  HI    Mean Arterial Pressure 118 mmHg    BP Site Right arm      Measurements from flowsheet : Measurements   2019 4:40 PM CST Height Measured - Standard 63 in    Weight Measured - Standard 143 lb    BSA 1.7 m2    Body Mass Index 25.33 kg/m2  HI      General:  Mild distress.    HENT:  Tympanic membranes are clear, No pharyngeal erythema, No sinus tenderness.    Neck:  Supple, No lymphadenopathy, pain with lateral bending to left and rotation to left, pain over cervical spine  and left trapezius,  pain over left shoulder blade and rhomboid.       Impression and Plan   Diagnosis     Rhomboid muscle strain (DDN63-TT S29.012A).     Strain of left trapezius muscle (KHJ53-NH S46.812A).     Summary:  will treat with cyclobenzaprine, medrol dosepak, and tramadol (mostly qhs), try lidocaine patch  she did have a peptic ulcer last year and will try to avoid excessive NSAIDs  follow up if not improving   .    Orders     Orders   Pharmacy:  traMADol 50 mg oral tablet (Prescribe): = 1 tab(s) ( 50 mg ), Oral, q4-6 hrs, PRN: for pain, # 30 tab(s), 0 Refill(s), Type: Maintenance, Pharmacy: [facility] #[id number], 1 tab(s) Oral q4-6 hrs,PRN:for pain  Medrol Dosepak 4 mg oral tablet (Prescribe): = 1 packet(s), Oral, once, Instructions: as directed on package labeling, # 21 tab(s), 0 Refill(s), Type: Soft Stop, Pharmacy: [facility] #[id number], 1 packet(s) Oral once,Instr:as directed on package labeling  cyclobenzaprine 10 mg oral tablet (Prescribe): = 1 tab(s) ( 10 mg ), PO, q8 hrs, PRN: for spasm, # 30 tab(s), 0 Refill(s), Type: Maintenance, Pharmacy: [facility] #[id number], 1 tab(s) Oral q8 hrs,PRN:for spasm.     Orders   Charges (Evaluation and Management):  13212 office outpatient visit 15 minutes (Charge) (Order): Quantity: 1, Strain of left trapezius muscle  Rhomboid muscle strain.

## 2022-02-16 NOTE — TELEPHONE ENCOUNTER
---------------------  From: Indira Jean Baptiste LPN (Phone Messages Pool ([id number]_[facility]))   To: [facility] Message Pool ([id number]_[place] [facility] [place]);     Sent: 3/17/2020 9:42:36 AM CDT  Subject: General Message       Phone Message Template      PCP:   CORNEL     Time of Call:  0815       Person Calling:  pt  Phone number:  [phone]    Returned call at: _    Note:   Patient canceled follow up physical therapy appt was wondering if [facility]FS could do a MRI of her neck. Patient also requested a refill of cyclobenzaprine. She stated that she will make a follow up appt later in time.   Last office visit and reason:  02/26/20, chest pain---------------------  From: July Garnett MA ([facility]FS Message Pool ([id number]_[facility]))   To: Augustin Chatman MD;     Sent: 3/17/2020 11:36:51 AM CDT  Subject: FW: General Message---------------------  From: Augustin Chatman MD   To: [facility] Message Pool ([id number]_[place] [facility] [place]);     Sent: 3/17/2020 11:47:29 AM CDT  Subject: RE: General Message     ok for mri cerv spine for cervical radiculopathy  ok to ref cyclobenz  ** Submitted: **  Order:MRI Cervical Spine (Request)  W/O CONTRAST  Details:  Instructions: W/O CONTRAST, Acute cervical radiculopathy         Signed by July Garnett MA  3/17/2020 5:38:00 PM    ** Submitted: **  Order:cyclobenzaprine (cyclobenzaprine 10 mg oral tablet)  1 tab(s)  PO  q8 hrs  Qty:  30 tab(s)        Refills:  3          Substitutions Allowed     PRN  for spasm      Route To Pharmacy - [facility] #[id number]    Signed by July Garnett MA  3/17/2020 5:38:00 PMLM for pt re: TFS recommendation.

## 2022-02-16 NOTE — LETTER
(Inserted Image. Unable to display)   [address] [place] [address]  July 28, 2021      YESSENIA RIDDLE      [address] [place] [address]        Dear YESSENIA,    Thank you for selecting [facility] for your healthcare needs.  Below you will find the results of you recent tests done at our clinic.     All of these labs are acceptable.      Result Name Current Result Previous Result Reference Range   Sodium Level (mmol/L)  139 7/26/2021  142 5/19/2020 135 - 146   Potassium Level (mmol/L)  4.6 7/26/2021  4.0 5/19/2020 3.5 - 5.3   Chloride Level (mmol/L)  103 7/26/2021  108 5/19/2020 98 - 110   CO2 Level (mmol/L)  29 7/26/2021  26 5/19/2020 20 - 32   Glucose Level (mg/dL)  98 7/26/2021  84 5/19/2020 65 - 99   BUN (mg/dL)  17 7/26/2021  17 5/19/2020 7 - 25   Creatinine Level (mg/dL)  0.80 7/26/2021  0.70 5/19/2020 0.50 - 1.10   eGFR (mL/min/1.73m2)  90 7/26/2021  106 5/19/2020 > OR = 60 -    Calcium Level (mg/dL) ((H)) 10.4 7/26/2021  9.4 5/19/2020 8.6 - 10.2   Bilirubin Total (mg/dL)  0.8 7/26/2021  0.2 - 1.2   Bilirubin Direct (mg/dL)  0.2 7/26/2021   - < OR = 0.2   Bilirubin Indirect  0.6 7/26/2021  0.2 - 1.2   Alkaline Phosphatase (unit/L)  57 7/26/2021  31 - 125   AST/SGOT (unit/L)  25 7/26/2021  10 - 30   ALT/SGPT (unit/L)  14 7/26/2021  6 - 29   Protein Total (gm/dL)  7.5 7/26/2021  6.1 - 8.1   Albumin Level (gm/dL)  5.0 7/26/2021  3.6 - 5.1   Globulin  2.5 7/26/2021  1.9 - 3.7   A/G Ratio  2.0 7/26/2021  1.0 - 2.5   C-Reactive Protein (CRP) (mg/L)  <0.2 7/26/2021   - <8.0   WBC  5.2 7/26/2021  7.3 2/26/2020 3.8 - 10.8   RBC  4.36 7/26/2021  4.53 2/26/2020 3.80 - 5.10   Hgb (gm/dL)  13.8 7/26/2021  14.2 2/26/2020 11.7 - 15.5   Hct (%)  40.6 7/26/2021  40.5 2/26/2020 35.0 - 45.0   MCV (fL)  93.1 7/26/2021  89 2/26/2020 80.0 - 100.0   MCH (pg)  31.7 7/26/2021  31.3 2/26/2020 27.0 - 33.0   MCHC (gm/dL)  34.0 7/26/2021  35.1 2/26/2020 32.0 - 36.0   RDW (%)  11.7  7/26/2021  13.0 2/26/2020 11.0 - 15.0   Platelet  222 7/26/2021  247 2/26/2020 140 - 400   MPV (fL)  12.3 7/26/2021  10.7 2/26/2020 7.5 - 12.5   Sed Rate  2 7/26/2021  6 2/26/2020  - < OR = 20       Please contact me or my assistant at [phone] if you have any questions.     Sincerely,        Roberto Pérez PA-C    What do your labs mean?  Below is a glossary of commonly ordered labs:  LDL   Bad Cholesterol   HDL   Good Cholesterol  AST/ALT   Liver Function   Cr/Creatinine   Kidney Function  Microalbumin   Kidney Function  BUN   Kidney Function  PSA   Prostate    TSH   Thyroid Hormone  HgbA1c   Diabetes Test   Hgb (Hemoglobin)   Red Blood Cells

## 2022-02-16 NOTE — PROGRESS NOTES
Patient:   YESSENIA RIDDLE            MRN: [id number]            FIN: [id number]               Age:   43 years     Sex:  Female     :  1975   Associated Diagnoses:   Abdominal pain; GERD without esophagitis   Author:   Roberto Pérez PA-C      Chief Complaint   10/1/2018 11:31 AM CDT   Pt here for FU on right sided abd pain from .  Pt states she is still nauseas and having right sided back pain and still having right sided abd pain.  Pt states she is also having urinary frequency since last visit.      History of Present Illness   18:  RUQ pain  Decreased appetite  1 emesis yesterday  no fever  No stool change or gu co  worse 30min after food     10/1/18: Chief complaint as above reviewed with patient. RUQ US and CT Abdomen/Pelvis normal. UA/UC normal.   Increased abdominal pain with laying flat, sleeps propped up.  Not able to eat anything except for baby food secondary to nausea and dry heaving. Continues to hydrate with water and ginger ale.   No history of UTIs, increased urinary frequency. No hematuria or dysuria.   Admits to history of infrequent bowel movements (about once a week, has been hospitalized for this) and these have been happening more regularly (once a day) over the past 4 months but have contained mucus. Recently cut dairy, tolerates soy milk. Last BM x 1 week ago.       Review of Systems   Constitutional:  Negative.    Eye:  Negative.    Ear/Nose/Mouth/Throat:  Negative.    Respiratory:  Negative.    Cardiovascular:  Negative.    Gastrointestinal:  Negative except as documented in history of present illness.    Genitourinary:  Negative except as documented in history of present illness.    Musculoskeletal:  Negative except as documented in history of present illness.    Integumentary:  Negative.    Neurologic:  Negative.    Psychiatric:  Negative.       Health Status   Allergies:    Allergic Reactions (Selected)  Severity Not Documented  Metal (No reactions were documented)  No Known  Medication Allergies   Medications:  (Selected)   Prescriptions  Prescribed  Zofran ODT 4 mg oral tablet, disintegrating: = 1 tab(s) ( 4 mg ), Oral, tid, PRN: for nausea/vomiting, # 10 EA, 0 Refill(s), Type: Maintenance, Pharmacy: [facility] [id number], 1 tab(s) Oral tid,PRN:for nausea/vomiting  amLODIPine 2.5 mg oral tablet: 1 tab(s) ( 2.5 mg ), po, daily, # 90 tab(s), 1 Refill(s), Type: Maintenance, Pharmacy: [facility] [id number], 1 tab(s) Oral daily  atorvastatin 40 mg oral tablet: 1 tab(s) ( 40 mg ), po, daily, # 90 tab(s), 1 Refill(s), Type: Maintenance, Pharmacy: [facility] [id number], 1 tab(s) Oral daily  traMADol 50 mg oral tablet: = 1 tab(s) ( 50 mg ), PO, q4hr, PRN: for pain, # 10 tab(s), 0 Refill(s), Type: Maintenance  Documented Medications  Documented  Fish Oil 1000 mg oral capsule: = 1 cap(s) ( 1,000 mg ), Oral, daily, 0 Refill(s), Type: Maintenance  Flonase 50 mcg/inh nasal spray: 1 spray(s), nasal, daily, 0 Refill(s), Type: Maintenance  Xyzal 5 mg oral tablet: 1 tab(s) ( 5 mg ), po, qpm, 0 Refill(s), Type: Maintenance  aspirin 81 mg oral tablet: 1 tab(s) ( 81 mg ), PO, Daily, # 30 tab(s), 0 Refill(s), Type: Maintenance   Problem list:    All Problems (Selected)  Allergic rhinitis, unspecified / SNOMED CT 570468682 / Confirmed  Pain in joint involving lower leg / SNOMED CT 426599763 / Confirmed  Back problem / SNOMED CT 223575047 / Confirmed  Gastrocnemius equinus / SNOMED CT 9322306072 / Confirmed  Disorder of sacrum / SNOMED CT 58903696 / Confirmed  Sleep disturbance / SNOMED CT 59089045 / Confirmed  Dyslipidemia / SNOMED CT 6918951365 / Confirmed  Facet syndrome / SNOMED CT 711181229 / Confirmed  Family history of heart attack / SNOMED CT 775187880 / Confirmed  Hip pain / SNOMED CT 12057460 / Confirmed  Hyperlipidemia, unspecified / SNOMED CT 34691586 / Confirmed  Lipidemia / SNOMED CT 61608261 / Confirmed  Hypertension goal BP (blood pressure) < 130/80 / SNOMED CT 2019651461 /  Confirmed  Knee pain / SNOMED CT 68879612 / Confirmed  Lumbago / SNOMED CT 987944008 / Confirmed  Lumbosacral neuritis / SNOMED CT 536715124 / Confirmed  Lumbosacral spondylosis / SNOMED CT 681847137 / Confirmed  Lumbosacral spondylosis without myelopathy / SNOMED CT 79763657 / Confirmed  Myalgia / SNOMED CT 370468202 / Confirmed  Myofascial pain / SNOMED CT 641897017 / Confirmed  Neuritis / SNOMED CT 108939898 / Confirmed  Nonspecific reaction to tuberculin skin test without active tuberculosis / SNOMED CT 0855097432 / Confirmed  Obesity / SNOMED CT 3500343865 / Probable  Pain in limb / SNOMED CT 680753285 / Confirmed  Leg pain / SNOMED CT 787193598 / Confirmed  Patellar tendinitis / SNOMED CT 97103076 / Confirmed  Patellofemoral syndrome / SNOMED CT 3096976119 / Confirmed  Pes anserinus bursitis / SNOMED CT 235178317 / Confirmed  Plantar fasciitis / SNOMED CT 449036075 / Confirmed  Disturbance of skin sensation / SNOMED CT 716472041 / Confirmed  Uterine prolapse / SNOMED CT 36006849 / Confirmed      Histories   Past Medical History:    Active  Hyperlipidemia, unspecified (64496424): Onset on 7/18/2013 at 38 years.  Disorder of sacrum (60466185): Onset on 1/24/2012 at 36 years.  Lumbosacral spondylosis without myelopathy (64474066): Onset on 1/3/2012 at 36 years.  Back problem (258691627): Onset on 1/3/2012 at 36 years.  Sleep disturbance (08968561): Onset on 6/27/2011 at 36 years.  Myalgia (176282646): Onset on 6/27/2011 at 36 years.  Pain in joint involving lower leg (928549329): Onset on 1/21/2011 at 35 years.  Neuritis (543834209): Onset on 12/3/2010 at 35 years.  Comments:  12/14/2017 CST 9:40 AM CST - Leny Cherry  Thoracic or lumbosacral  Disturbance of skin sensation (455917483): Onset on 11/23/2010 at 35 years.  Lumbago (693213394): Onset on 9/22/2010 at 35 years.  Pain in limb (602705030): Onset on 9/7/2010 at 35 years.  Nonspecific reaction to tuberculin skin test without active tuberculosis (4869856529):  Onset on 2010 at 35 years.  Allergic rhinitis, unspecified (934408975)  Patellar tendinitis (94852356)  Pes anserinus bursitis (850549276)  Knee pain (49048566)  Hip pain (03072142)  Facet syndrome (951407015)  Comments:  2017 CST 9:50 AM CST - Leny Cherry  Lumbar  Lumbosacral spondylosis (764350103)  Leg pain (947695467)  Patellofemoral syndrome (4294514765)  Gastrocnemius equinus (8770795922)  Dyslipidemia (7632559993)  Lumbosacral neuritis (056182800)  Myofascial pain (803708976)  Plantar fasciitis (715824937)  Uterine prolapse (40691491)  Resolved  Chronic sinusitis, unspecified (58591029): Onset on 2010 at 35 years.  Resolved.  Other Disorders of Menstruation and Other Abnormal Bleeding from Female Genital Tract (626.8):  Resolved.  Uterine Prolapse without Mention of Vaginal Wall Prolapse (618.1):  Resolved.  Pregnancy:  Resolved.  Tobacco Use Disorder (305.1):  Resolved.  Pilonidal cyst with abscess (758570907):  Resolved.  Pregnancy (050477110):  Resolved in  at 28 years.  Pregnancy (835294113):  Resolved in  at 29 years.  History of tobacco use (1148852044):  Resolved.  Dysfunctional uterine bleeding (94285401):  Resolved.  Pilonidal cyst with abscess (238842643):  Resolved.   Family History:    Cardiac pacemaker  Father (Jacobo)  Comments:  2017 10:17 AM - Leny Cherry  Irregular heart beat  Diabetes mellitus  Mother (Bianka, )  Heart disease  Father (Jacobo)  Comments:  2017 10:38 AM - Leny Cherry  Bypass surgery at the age of 40.    2017 10:16 AM - Leny Cherry  Irregular heart beat  Grandfather (P)  Comments:  2017 10:35 AM - Leny Cherry  Heart attack at the age of 50.  Grandmother (P) ()  Comments:  2017 10:35 AM - Leny Cherry  Heart attack at the age of 30.  Mother (Bianka, )  Comments:  2017 10:16 AM - Leny Cherry  Bypass graft  High blood pressure  Father (Jacobo)  Mother (Bianka, )  CA - Cancer  Mother (Bianka,  )  MS - Multiple sclerosis  Sister  Mother (Bianka, )  Aunt (M)  Colon Polyps  Mother (Bianka, )  Kidney Disease  Mother (Bianka, )  Comments:  2017 10:37 AM - Leny Cherry  Kidney failure  Colon cancer  Grandfather (M) ()  Great Uncle (P) ()  Great Grandfather (M)     Procedure history:    Cardiac computed tomography for calcium scoring (9932161516) on 2017 at 42 Years.  Comments:  2017 1:58 PM - Shantel Pizano  Total Agatston calcium score is 8.  THR - Total hip replacement (714018410) in  at 39 Years.  Comments:  2017 10:58 AM - Onelia Espino  left  Pilonidal cyst (5382589098) on 2012 at 37 Years.  Comments:  2017 10:41 AM - Leny Cherry  Excision.  No abscess.  Nerve conduction study (16869092) on 2010 at 35 Years.  Cystoscopy (13181397) on 2009 at 34 Years.  Hysterectomy (511351971) on 2007 at 32 Years.  Comments:  2017 10:44 AM - Leny Cherry  Right salpingo-oophorectomy.   Social History:        Alcohol Assessment            1 drink every 3 months or more.      Tobacco Assessment            Former smoker, quit more than 30 days ago, Cigarettes, Started age 13 Years.  Stopped age 33 Years.      Employment and Education Assessment            Employed, Work/School description: [employer].            Employed, Work/School description: CMA.      Exercise and Physical Activity Assessment            Exercise frequency: 2-3 x per wk..        Physical Examination   Vital Signs   10/1/2018 11:31 AM CDT Temperature Tympanic 97 DegF  LOW    Peripheral Pulse Rate 64 bpm    Pulse Site Radial artery    Respiratory Rate 16 br/min    Systolic Blood Pressure 140 mmHg  HI    Diastolic Blood Pressure 90 mmHg  HI    Mean Arterial Pressure 107 mmHg    BP Site Right arm      Measurements from flowsheet : Measurements   10/1/2018 11:31 AM CDT Height Measured - Standard 63 in    Weight Measured - Standard 186 lb    BSA 1.93 m2    Body Mass  Index 32.94 kg/m2  HI      General:  No acute distress.    Eye:  Pupils are equal, round and reactive to light, Extraocular movements are intact.    HENT:  Tympanic membranes are clear, No pharyngeal erythema, Tacky mucous membranes. .    Neck:  Supple, Non-tender, No lymphadenopathy.    Respiratory:  Lungs are clear to auscultation.    Cardiovascular:  Normal rate, Regular rhythm, No murmur.    Gastrointestinal:  Soft, Non-distended, Hypoactive bowel sounds. RUQ and epigastric tenderness to palpation..    Genitourinary:  Left CVA tenderness..    Musculoskeletal:  Normal range of motion, Normal gait.    Integumentary:  Warm, Dry, No rash.    Neurologic:  Alert, Oriented.    Psychiatric:  Appropriate mood & affect.       Review / Management   Results review:  Lab results   10/1/2018 12:17 PM CDT UA pH 5.5    UA Specific Gravity > OR = 1.030    UA Glucose NEGATIVE    UA Bilirubin NEGATIVE    UA Ketones NEGATIVE    Urine Occult Blood TRACE    UA Protein NEGATIVE    UA Nitrite NEGATIVE    UA Leukocyte Esterase NEGATIVE   .         Interpretation: will also get a urine culture, patient will be informed if culture results in a change of therapy.    Course:  she is given some zofran and then a GI cocktail, she gets some relief from that.       Impression and Plan   Diagnosis     Abdominal pain (HPG36-GV R10.9).     GERD without esophagitis (JBA11-IL K21.9).     Summary:  will treat with omeprazole bid for a month and then use qd for the next month, can use Maalox/mylanta prn GI irritation, follow up if not improving.    Orders     Orders   Lab (Gen Lab  Reference Lab):  Helicobacter pylori Urea Breath Test, Infra-red* ([facility]) (Order): Specimen Type: Breath, Collection Date: 10/1/2018 12:04 PM CDT  Culture, Urine, Routine* ([facility]) (Order): Specimen Type: Urine (Clean Catch), Collection Date: 10/1/2018 12:04 PM CDT  POC URINALYSIS, UA* ([facility]) (Order): Specimen Type: Urine, Collection Date: 10/1/2018 12:04 PM CDT.      Orders   Pharmacy:  omeprazole 20 mg oral delayed release tablet (Prescribe): = 1 tab(s) ( 20 mg ), Oral, bid, Instructions: use bid for one month and then daily for 1-2 months, # 60 tab(s), 1 Refill(s), Type: Maintenance, Pharmacy: [facility] [id number], 1 tab(s) Oral bid,Instr:use bid for one month and then daily for 1-2 months.     Orders   Charges (Evaluation and Management):  47178 office outpatient visit 25 minutes (Charge) (Order): Quantity: 1, GERD without esophagitis  Abdominal pain.

## 2022-02-16 NOTE — TELEPHONE ENCOUNTER
---------------------  From: Stephany Dugan CMA (eRx Pool ([id number]_[facility]))   To: [facility] Message Pool ([id number]);     Sent: 9/15/2020 4:00:50 PM CDT  Subject: FW: Medication Management   Due Date/Time: 9/14/2020 10:04:00 AM CDT     Medication Refill needing approval    PCP:   [facility]      Last Filled:  8/18/20    Quantity:  60  Refills:  0  CSA on file?   _     Date of last office visit and reason:   5/19/20 HTN  Date of last labs pertaining to condition:  5/19/20    Note:  Please advise if ok for refill    Return to Clinic order placed?  Yes due visit in Nov          ------------------------------------------  From: [employer] #[id number]  To: Augustin Chatman MD  Sent: September 13, 2020 10:04:25 AM CDT  Subject: Medication Management  Due: September 9, 2020 4:21:06 PM CDT     ** On Hold Pending Signature **     Dispensed Drug: tiZANidine (tiZANidine 4 mg oral tablet), TAKE 2 TABLETS BY MOUTH EVERY 8 HOURS AS NEEDED FOR MUSCLE PAIN  Quantity: 60 tab(s)  Days Supply: 10  Refills: 0  Substitutions Allowed  Notes from Pharmacy:  ---------------------------------------------------------------  From: Rere Case ([facility] Message Pool ([id number]_[facility]))   To: Augustin Chatman MD;     Sent: 9/15/2020 5:29:25 PM CDT  Subject: FW: Medication Management   Due Date/Time: 9/14/2020 10:04:00 AM CDT---------------------  From: Augustin Chatman MD   To: [employer] #[id number]    Sent: 9/15/2020 6:09:23 PM CDT  Subject: FW: Medication Management     ** Submitted: **  Complete:tiZANidine (tiZANidine 4 mg oral tablet)   Signed by Augustin Chatman MD  9/15/2020 11:09:00 PM [facility]    ** Approved **  tiZANidine (TIZANIDINE 4MG TABLETS)  TAKE 2 TABLETS BY MOUTH EVERY 8 HOURS AS NEEDED FOR MUSCLE PAIN  Qty:  60 tab(s)        Days Supply:  10        Refills:  0          Substitutions Allowed     Route To Pharmacy - [facility] #[id number]

## 2022-02-16 NOTE — NURSING NOTE
Comprehensive Intake Entered On:  10/8/2019 4:43 PM CDT    Performed On:  10/8/2019 4:41 PM CDT by Rere Case               Summary   Chief Complaint :   HTN check.  Also bilateral ear pain.    Weight Measured :   143.0 lb(Converted to: 143 lb 0 oz, 64.86 kg)    Height Measured :   63 in(Converted to: 5 ft 3 in, 160.02 cm)    Body Mass Index :   25.33 kg/m2 (HI)    Body Surface Area :   1.7 m2   Systolic Blood Pressure :   146 mmHg (HI)    Diastolic Blood Pressure :   89 mmHg (HI)    Mean Arterial Pressure :   108 mmHg   Peripheral Pulse Rate :   79 bpm   BP Method :   Electronic   HR Method :   Electronic   Temperature Tympanic :   97.7 DegF(Converted to: 36.5 DegC)  (LOW)    Reer Case - 10/8/2019 4:41 PM CDT   Health Status   Allergies Verified? :   Yes   Medication History Verified? :   Yes   Pre-Visit Planning Status :   Completed   Tobacco Use? :   Former smoker   Rere Case - 10/8/2019 4:41 PM CDT   Meds / Allergies   (As Of: 10/8/2019 4:43:14 PM CDT)   Allergies (Active)   Metal  Estimated Onset Date:   Unspecified ; Created By:   Leny Cherry; Reaction Status:   Active ; Category:   Other ; Substance:   Metal ; Type:   Allergy ; Updated By:   Leny Cherry; Reviewed Date:   1/15/2019 6:43 PM CST      No Known Medication Allergies  Estimated Onset Date:   Unspecified ; Created By:   Rere Case; Reaction Status:   Active ; Category:   Drug ; Substance:   No Known Medication Allergies ; Type:   Allergy ; Updated By:   Rere Case; Reviewed Date:   1/15/2019 6:43 PM CST        Medication List   (As Of: 10/8/2019 4:43:14 PM CDT)   Prescription/Discharge Order    atorvastatin  :   atorvastatin ; Status:   Prescribed ; Ordered As Mnemonic:   atorvastatin 40 mg oral tablet ; Simple Display Line:   1 tab(s), Oral, daily, 30 tab(s), 0 Refill(s) ; Ordering Provider:   Augustin Chatman MD; Catalog Code:   atorvastatin ; Order Dt/Tm:   8/30/2019 11:42:43 AM CDT          amLODIPine  :    amLODIPine ; Status:   Prescribed ; Ordered As Mnemonic:   amLODIPine 2.5 mg oral tablet ; Simple Display Line:   1 tab(s), Oral, daily, 30 tab(s), 0 Refill(s) ; Ordering Provider:   Augustin Chatman MD; Catalog Code:   amLODIPine ; Order Dt/Tm:   8/30/2019 11:42:08 AM CDT          omeprazole  :   omeprazole ; Status:   Processing ; Ordered As Mnemonic:   omeprazole 20 mg oral delayed release capsule ; Ordering Provider:   Augustin Chatman MD; Action Display:   Complete ; Catalog Code:   omeprazole ; Order Dt/Tm:   10/8/2019 4:42:11 PM CDT          ondansetron  :   ondansetron ; Status:   Processing ; Ordered As Mnemonic:   ondansetron 8 mg oral tablet, disintegrating ; Ordering Provider:   Augustin Chatman MD; Action Display:   Complete ; Catalog Code:   ondansetron ; Order Dt/Tm:   10/8/2019 4:42:23 PM CDT            Home Meds    omega-3 polyunsaturated fatty acids  :   omega-3 polyunsaturated fatty acids ; Status:   Documented ; Ordered As Mnemonic:   Fish Oil 1000 mg oral capsule ; Simple Display Line:   1,000 mg, 1 cap(s), Oral, daily, 0 Refill(s) ; Catalog Code:   omega-3 polyunsaturated fatty acids ; Order Dt/Tm:   9/25/2018 5:25:20 PM CDT          aspirin  :   aspirin ; Status:   Documented ; Ordered As Mnemonic:   aspirin 81 mg oral tablet ; Simple Display Line:   81 mg, 1 tab(s), PO, Daily, 30 tab(s), 0 Refill(s) ; Catalog Code:   aspirin ; Order Dt/Tm:   7/12/2018 8:41:06 AM CDT

## 2022-02-16 NOTE — NURSING NOTE
Comprehensive Intake Entered On:  2/24/2021 2:03 PM CST    Performed On:  2/24/2021 2:00 PM CST by Rere Case               Summary   Chief Complaint :   Left leg pain, concern for blood clot.   Rere Case - 2/24/2021 2:15 PM CST     Weight Measured :   130.0 lb(Converted to: 130 lb 0 oz, 58.967 kg)    Height Measured :   63 in(Converted to: 5 ft 3 in, 160.02 cm)    Body Mass Index :   23.03 kg/m2   Body Surface Area :   1.62 m2   Systolic Blood Pressure :   125 mmHg   Diastolic Blood Pressure :   74 mmHg   Mean Arterial Pressure :   91 mmHg   Peripheral Pulse Rate :   86 bpm   BP Method :   Electronic   HR Method :   Electronic   Temperature Tympanic :   97.5 DegF(Converted to: 36.4 DegC)  (LOW)    Rere Case - 2/24/2021 2:00 PM CST   Health Status   Allergies Verified? :   Yes   Medication History Verified? :   Yes   Pre-Visit Planning Status :   Completed   Tobacco Use? :   Former smoker   Rere Case - 2/24/2021 2:00 PM CST   Meds / Allergies   (As Of: 2/24/2021 2:03:07 PM CST)   Allergies (Active)   Metal  Estimated Onset Date:   Unspecified ; Created By:   Leny Cherry; Reaction Status:   Active ; Category:   Other ; Substance:   Metal ; Type:   Allergy ; Updated By:   Leny Cherry; Reviewed Date:   9/30/2020 4:28 PM CDT      No Known Medication Allergies  Estimated Onset Date:   Unspecified ; Created By:   Rere Case; Reaction Status:   Active ; Category:   Drug ; Substance:   No Known Medication Allergies ; Type:   Allergy ; Updated By:   Rere Case; Reviewed Date:   9/30/2020 4:28 PM CDT        Medication List   (As Of: 2/24/2021 2:03:07 PM CST)   Prescription/Discharge Order    amLODIPine  :   amLODIPine ; Status:   Prescribed ; Ordered As Mnemonic:   amLODIPine 5 mg oral tablet ; Simple Display Line:   5 mg, 1 tab(s), Oral, daily, 90 tab(s), 1 Refill(s) ; Ordering Provider:   Augustin Chatman MD; Catalog Code:   amLODIPine ; Order Dt/Tm:   12/8/2020 2:57:16 PM  CST          atorvastatin  :   atorvastatin ; Status:   Prescribed ; Ordered As Mnemonic:   atorvastatin 40 mg oral tablet ; Simple Display Line:   1 tab(s), Oral, daily, 90 tab(s), 1 Refill(s) ; Ordering Provider:   Augustin Chatman MD; Catalog Code:   atorvastatin ; Order Dt/Tm:   12/8/2020 2:57:14 PM CST          celecoxib  :   celecoxib ; Status:   Prescribed ; Ordered As Mnemonic:   celecoxib 100 mg oral capsule ; Simple Display Line:   1 cap(s), Oral, bid, 180 cap(s), 1 Refill(s) ; Ordering Provider:   Augustin Chatman MD; Catalog Code:   celecoxib ; Order Dt/Tm:   12/8/2020 2:57:16 PM CST          gabapentin  :   gabapentin ; Status:   Prescribed ; Ordered As Mnemonic:   gabapentin 300 mg oral capsule ; Simple Display Line:   1 cap(s), Oral, tid, 270 cap(s), 1 Refill(s) ; Ordering Provider:   Augustin Chatman MD; Catalog Code:   gabapentin ; Order Dt/Tm:   12/8/2020 2:57:17 PM CST          tiZANidine  :   tiZANidine ; Status:   Prescribed ; Ordered As Mnemonic:   tiZANidine 4 mg oral tablet ; Simple Display Line:   2 tab(s), Oral, q8 hrs, PRN: AS NEEDED FOR MUSCLE PAIN, 60 tab(s), 5 Refill(s) ; Ordering Provider:   Augustin Chatman MD; Catalog Code:   tiZANidine ; Order Dt/Tm:   12/8/2020 2:57:15 PM CST            Home Meds    omega-3 polyunsaturated fatty acids  :   omega-3 polyunsaturated fatty acids ; Status:   Documented ; Ordered As Mnemonic:   Fish Oil 1000 mg oral capsule ; Simple Display Line:   1,000 mg, 1 cap(s), Oral, daily, 0 Refill(s) ; Catalog Code:   omega-3 polyunsaturated fatty acids ; Order Dt/Tm:   9/25/2018 5:25:20 PM CDT          aspirin  :   aspirin ; Status:   Documented ; Ordered As Mnemonic:   aspirin 81 mg oral tablet ; Simple Display Line:   81 mg, 1 tab(s), PO, Daily, 30 tab(s), 0 Refill(s) ; Catalog Code:   aspirin ; Order Dt/Tm:   7/12/2018 8:41:06 AM CDT            ID Risk Screen   Recent Travel History :   No recent travel   Family Member Travel History :   No recent  travel   Other Exposure to Infectious Disease :   Unknown   COVID-19 Testing Status :   No positive COVID-19 test   Rere Case - 2/24/2021 2:00 PM CST

## 2022-02-16 NOTE — NURSING NOTE
Comprehensive Intake Entered On:  2/18/2020 6:54 PM CST    Performed On:  2/18/2020 6:52 PM CST by Rere Case               Summary   Chief Complaint :   Back/neck/shoulder and lung pain.    Weight Measured :   136.2 lb(Converted to: 136 lb 3 oz, 61.78 kg)    Height Measured :   63 in(Converted to: 5 ft 3 in, 160.02 cm)    Body Mass Index :   24.12 kg/m2   Body Surface Area :   1.66 m2   Systolic Blood Pressure :   130 mmHg   Diastolic Blood Pressure :   78 mmHg   Mean Arterial Pressure :   95 mmHg   Peripheral Pulse Rate :   64 bpm   BP Method :   Electronic   HR Method :   Electronic   Temperature Tympanic :   97.1 DegF(Converted to: 36.2 DegC)  (LOW)    Rere Case - 2/18/2020 6:52 PM CST   Health Status   Allergies Verified? :   Yes   Medication History Verified? :   Yes   Tobacco Use? :   Former smoker   Rere Case - 2/18/2020 6:52 PM CST   Meds / Allergies   (As Of: 2/18/2020 6:54:08 PM CST)   Allergies (Active)   Metal  Estimated Onset Date:   Unspecified ; Created By:   Leny Cherry; Reaction Status:   Active ; Category:   Other ; Substance:   Metal ; Type:   Allergy ; Updated By:   Leny Cherry; Reviewed Date:   2/2/2020 10:58 AM CST      No Known Medication Allergies  Estimated Onset Date:   Unspecified ; Created By:   Rere Case; Reaction Status:   Active ; Category:   Drug ; Substance:   No Known Medication Allergies ; Type:   Allergy ; Updated By:   Rere Case; Reviewed Date:   2/2/2020 10:58 AM CST        Medication List   (As Of: 2/18/2020 6:54:08 PM CST)   Prescription/Discharge Order    atorvastatin  :   atorvastatin ; Status:   Prescribed ; Ordered As Mnemonic:   atorvastatin 40 mg oral tablet ; Simple Display Line:   1 tab(s), Oral, daily, 90 tab(s), 1 Refill(s) ; Ordering Provider:   Augustin Chatman MD; Catalog Code:   atorvastatin ; Order Dt/Tm:   10/8/2019 5:03:22 PM CDT          amLODIPine  :   amLODIPine ; Status:   Prescribed ; Ordered As Mnemonic:    Patient began having abdominal pain after drinking cranberry juice at 3pm today. amLODIPine 5 mg oral tablet ; Simple Display Line:   5 mg, 1 tab(s), Oral, daily, 90 tab(s), 1 Refill(s) ; Ordering Provider:   Augustin Chatman MD; Catalog Code:   amLODIPine ; Order Dt/Tm:   10/8/2019 5:03:21 PM CDT            Home Meds    omega-3 polyunsaturated fatty acids  :   omega-3 polyunsaturated fatty acids ; Status:   Documented ; Ordered As Mnemonic:   Fish Oil 1000 mg oral capsule ; Simple Display Line:   1,000 mg, 1 cap(s), Oral, daily, 0 Refill(s) ; Catalog Code:   omega-3 polyunsaturated fatty acids ; Order Dt/Tm:   9/25/2018 5:25:20 PM CDT          aspirin  :   aspirin ; Status:   Documented ; Ordered As Mnemonic:   aspirin 81 mg oral tablet ; Simple Display Line:   81 mg, 1 tab(s), PO, Daily, 30 tab(s), 0 Refill(s) ; Catalog Code:   aspirin ; Order Dt/Tm:   7/12/2018 8:41:06 AM CDT

## 2022-02-16 NOTE — TELEPHONE ENCOUNTER
---------------------  From: Zaid AGRAWAL Leny   To: [facility] Message Pool ([facility]);     Sent: 3/1/2019 12:56:47 PM CST  Subject: General Message-PA for albendzole     Phone Message    PCP:   CORNEL      Time of Call:  1216       Person Calling:  self  Phone number:  [phone], ok lm     Returned call at: [phone]    Note:   Pt had script for albendazole sent to [facility] yesterday for pinworms.  She was told by [facility] that they needed a letter/reason why pt taking.   Called [facility] and PA needs to be completed.  Initiated PA through [facility] and LM with pt that we will wait on a response.---------------------  From: Kyra AGRAWAL Lesa ([facility] Message Pool ([address] [place] [address]))   To: Augustin Chatman MD;     Sent: 3/1/2019 1:08:32 PM CST  Subject: FW: General Message-PA for albendzole     FYI---------------------  From: Augustin Chatman MD   To: [facility] Message Pool ([address] [place] [address]);     Sent: 3/1/2019 1:30:08 PM CST  Subject: RE: General Message-PA for albendzole     it is for pinworms---------------------  From: Rere Case ([facility] Message Pool ([address] [place] [address]))   To: Augustin Chatman MD;     Sent: 3/5/2019 12:52:32 PM CST  Subject: FW: General Message-PA for albendzole     Prior auth was denied.  Per insurance patient must have a laboratory confirmed case of pinworms for coverage.---------------------  From: Augustin Chatman MD   To: [facility] Message Pool ([address] [place] [address]);     Sent: 3/5/2019 1:45:55 PM CST  Subject: RE: General Message-PA for albendzole     let her know Ok for lab if she wantsLeft message for patient to call back at: 1:49pmPatient called at 1402.  Called her back at 1512 and discussed insurance and TFS message.  Patient would like to be tested.  She says she is coming in Thursday for labs so can  the supplies needed to test for pinworms at that time and bring in when collected.RTC order placed.

## 2022-02-16 NOTE — PROGRESS NOTES
Patient:   YESSENIA RIDDLE            MRN: [id number]            FIN: [id number]               Age:   44 years     Sex:  Female     :  1975   Associated Diagnoses:   Bloody diarrhea; Chronic abdominal pain; Chronic nausea; Weight loss   Author:   Jorgito Ovalle MD      Visit Information      Date of Service: 2019 04:43 pm  Performing Location: [facility]  Encounter#: [id number]      Primary Care Provider (PCP):  Augustin Chatman MD    NPI# [id number]      Referring Provider:  Jorgito Ovalle MD    NPI# [id number]      Chief Complaint   3/14/2019 5:00 PM CDT    consult per TFS - c/o weight loss- no appetite, N/V, inconsistent bowels-blood in stool/mucous, fatigue, RUQ pains - negative GB u/s.  Has been eval by [facility]- f/u appt 3/26            Additional Information:No additional information recorded during visit.   Chief complaint and symptoms as noted above and confirmed with patient.  Recent lab and diagnostic studies reviewed with patient      History of Present Illness   3/14/2019: Leticia asked to see me by Dr. Chatman related to chronic abdominal pain, weight loss and diarrhea.  She says that symptoms started back in 2018.  At baseline, describes herself as having very low frequency of bowel movements typically would go as long as 1 week without bowel movements.  Over the past 6 weeks has generally had daily watery-based diarrhea.  She describes significant lower abdominal cramping, tenesmus, bloody and mucousy components to her diarrhea.  She has lost nearly 30 pounds over this timeframe.  Describes chronic nausea partially improved with Zofran.  She describes abdominal pains primarily in her right upper quadrant location as well as lower pelvic discomfort.  She was originally treated for H. pylori back in October due to a positive urea breath test.  She did not notice any improvement in symptoms at that time.  Subsequently underwent another H. pylori testing here locally though have  the test repeated through [facility] approximately a week and a half later; that test was subsequently negative.  She is scheduled to follow-up with [facility] later this month.  No family history significant for inflammatory bowel disease.  She describes a maternal uncle who passed away from colon cancer.  Both her mother and her sister both have a chronic progressive multiple sclerosis.         Review of Systems   Constitutional:  Chills, Weakness, Fatigue, No fever.    Eye:  Negative except as documented in history of present illness.    Ear/Nose/Mouth/Throat:  Negative except as documented in history of present illness.    Respiratory:  No shortness of breath.    Cardiovascular:  No chest pain, No palpitations, No peripheral edema, No syncope.    Gastrointestinal:  Nausea, Diarrhea, bloody diarrhea, No vomiting.         Abdominal pain: Right, Upper quadrant, Lower quadrant.    Genitourinary:  No dysuria, No hematuria.    Hematology/Lymphatics:  Negative except as documented in history of present illness.    Endocrine:  No excessive thirst, No polyuria.    Immunologic:  Malaise, No recurrent fevers.    Musculoskeletal:  No joint pain, No muscle pain.    Neurologic:  Alert and oriented X4, No numbness, No tingling, No headache.       Health Status   Allergies:    Allergic Reactions (Selected)  Severity Not Documented  Metal (No reactions were documented)  No Known Medication Allergies   Medications:  (Selected)   Prescriptions  Prescribed  amLODIPine 2.5 mg oral tablet: = 1 tab(s), Oral, daily, # 90 tab(s), 1 Refill(s), Type: Maintenance, Pharmacy: [id number], 1 tab(s) Oral daily  atorvastatin 40 mg oral tablet: = 1 tab(s), Oral, daily, # 90 tab(s), 1 Refill(s), Type: Maintenance, Pharmacy: [id number], 1 tab(s) Oral daily  omeprazole 20 mg oral delayed release capsule: = 1 cap(s), Oral, bid, # 180 cap(s), 1 Refill(s), Type: Maintenance, Pharmacy:  [facility] [id number], 1 cap(s) Oral bid  ondansetron 8 mg oral tablet, disintegrating: = 1 tab(s), Oral, q8 hrs, PRN: AS NEEDED FOR NAUSEA OR VOMITING, # 12 tab(s), 0 Refill(s), Type: Soft Stop, Pharmacy: [facility] [id number], 1 tab(s) Oral q8 hrs,PRN:AS NEEDED FOR NAUSEA OR VOMITING  Documented Medications  Documented  Fish Oil 1000 mg oral capsule: = 1 cap(s) ( 1,000 mg ), Oral, daily, 0 Refill(s), Type: Maintenance  aspirin 81 mg oral tablet: 1 tab(s) ( 81 mg ), PO, Daily, # 30 tab(s), 0 Refill(s), Type: Maintenance,    Medications          *denotes recorded medication          amLODIPine 2.5 mg oral tablet: 1 tab(s), Oral, daily, 90 tab(s), 1 Refill(s).          *aspirin 81 mg oral tablet: 81 mg, 1 tab(s), PO, Daily, 30 tab(s), 0 Refill(s).          atorvastatin 40 mg oral tablet: 1 tab(s), Oral, daily, 90 tab(s), 1 Refill(s).          *Fish Oil 1000 mg oral capsule: 1,000 mg, 1 cap(s), Oral, daily, 0 Refill(s).          omeprazole 20 mg oral delayed release capsule: 1 cap(s), Oral, bid, 180 cap(s), 1 Refill(s).          ondansetron 8 mg oral tablet, disintegratin tab(s), Oral, q8 hrs, PRN: AS NEEDED FOR NAUSEA OR VOMITING, 12 tab(s), 0 Refill(s).     Problem list:    All Problems  Abdominal pain / SNOMED CT 79030847 / Confirmed  Allergic rhinitis, unspecified / SNOMED CT 100472748 / Confirmed  Gastrocnemius equinus / SNOMED CT 9196568269 / Confirmed  Sleep disturbance / SNOMED CT 12361757 / Confirmed  Family history of heart attack / SNOMED CT 955549014 / Confirmed  GERD without esophagitis / SNOMED CT 8229303659 / Confirmed  Hyperlipidemia, unspecified / SNOMED CT 78798641 / Confirmed  Hypertension goal BP (blood pressure) < 130/80 / SNOMED CT 2086479904 / Confirmed  Lumbosacral neuritis / SNOMED CT 109215688 / Confirmed  Lumbosacral spondylosis without myelopathy / SNOMED CT 72766043 / Confirmed  Myofascial pain / SNOMED CT 334092746 / Confirmed  Nonspecific reaction to tuberculin skin test  without active tuberculosis / SNOMED CT 1779491457 / Confirmed  Obesity / SNOMED CT 1800095576 / Probable  Resolved: Pain in joint involving lower leg / SNOMED CT 842630081  Resolved: Back problem / SNOMED CT 546052634  Resolved: Chronic sinusitis, unspecified / SNOMED CT 88605903  Resolved: Disorder of sacrum / SNOMED CT 91705871  Resolved: Dysfunctional uterine bleeding / SNOMED CT 87027980  Resolved: Dyslipidemia / SNOMED CT 9013178712  Resolved: Facet syndrome / SNOMED CT 440507479  Resolved: Hip pain / SNOMED CT 41952360  Resolved: History of tobacco use / SNOMED CT 8004209035  Resolved: Lipidemia / SNOMED CT 01216107  Resolved: H. pylori infection / SNOMED CT 7252185894  Resolved: Knee pain / SNOMED CT 43936112  Resolved: Lumbago / SNOMED CT 153715029  Resolved: Lumbosacral spondylosis / SNOMED CT 640618873  Resolved: Myalgia / SNOMED CT 895920691  Resolved: Neuritis / SNOMED CT 744175265  Resolved: Other Disorders of Menstruation and Other Abnormal Bleeding from Female Genital Tract / ICD-9-.8  Resolved: Pain in limb / SNOMED CT 065193052  Resolved: Leg pain / SNOMED CT 881559051  Resolved: Patellar tendinitis / SNOMED CT 45535333  Resolved: Patellofemoral syndrome / SNOMED CT 1700198301  Resolved: Pes anserinus bursitis / SNOMED CT 627436877  Resolved: Pilonidal cyst with abscess / SNOMED CT 453388187  Resolved: Pilonidal cyst with abscess / SNOMED CT 428252347  Resolved: Plantar fasciitis / SNOMED CT 234678060  Resolved: Pregnancy  Resolved: Pregnancy / SNOMED CT 979716126  Resolved: Pregnancy / SNOMED CT 754782518  Resolved: Disturbance of skin sensation / SNOMED CT 273471497  Resolved: Tobacco Use Disorder / ICD-9-.1  Resolved: Uterine prolapse / SNOMED CT 98416147  Resolved: Uterine Prolapse without Mention of Vaginal Wall Prolapse / ICD-9-.1  Canceled: Family History of Other Cardiovascular Diseases / ICD-9-CM V17.49  Canceled: Obesity / SNOMED CT 1554738383      Histories   Past  Medical History:    Active  Hyperlipidemia, unspecified (68702174): Onset on 7/18/2013 at 38 years.  Lumbosacral spondylosis without myelopathy (27332230): Onset on 1/3/2012 at 36 years.  Sleep disturbance (32035786): Onset on 6/27/2011 at 36 years.  Nonspecific reaction to tuberculin skin test without active tuberculosis (9106776212): Onset on 1/29/2010 at 35 years.  Allergic rhinitis, unspecified (016949785)  Gastrocnemius equinus (8098063413)  Lumbosacral neuritis (016631809)  Myofascial pain (172650016)  Resolved  Disorder of sacrum (12061454): Onset on 1/24/2012 at 36 years.  Resolved.  Back problem (142998015): Onset on 1/3/2012 at 36 years.  Resolved.  Myalgia (207630851): Onset on 6/27/2011 at 36 years.  Resolved.  Pain in joint involving lower leg (325338230): Onset on 1/21/2011 at 35 years.  Resolved.  Neuritis (087564611): Onset on 12/3/2010 at 35 years.  Resolved.  Comments:  12/14/2017 CST 9:40 AM CST - Leny Cherry  Thoracic or lumbosacral  Disturbance of skin sensation (209461252): Onset on 11/23/2010 at 35 years.  Resolved.  Lumbago (935228710): Onset on 9/22/2010 at 35 years.  Resolved.  Pain in limb (155039146): Onset on 9/7/2010 at 35 years.  Resolved.  Chronic sinusitis, unspecified (23711509): Onset on 2/16/2010 at 35 years.  Resolved.  Other Disorders of Menstruation and Other Abnormal Bleeding from Female Genital Tract (626.8):  Resolved.  Uterine Prolapse without Mention of Vaginal Wall Prolapse (618.1):  Resolved.  Pregnancy:  Resolved.  Tobacco Use Disorder (305.1):  Resolved.  Patellar tendinitis (24300532):  Resolved.  Pes anserinus bursitis (504861301):  Resolved.  Pilonidal cyst with abscess (550837143):  Resolved.  Pregnancy (054794099):  Resolved in 2004 at 28 years.  Pregnancy (033022303):  Resolved in 2005 at 29 years.  Lipidemia (55684714):  Resolved.  Knee pain (23099786):  Resolved.  Hip pain (88479903):  Resolved on 3/7/2019 at 44 years.  Facet syndrome (086663710):   Resolved.  Comments:  2017 CST 9:50 AM Leny León  Lumbar  Lumbosacral spondylosis (314783461):  Resolved.  Leg pain (967136554):  Resolved.  Patellofemoral syndrome (2432298699):  Resolved.  Dyslipidemia (0457412860):  Resolved.  Plantar fasciitis (414240728):  Resolved.  History of tobacco use (6044061139):  Resolved.  Dysfunctional uterine bleeding (15111495):  Resolved.  Pilonidal cyst with abscess (404444784):  Resolved.  Uterine prolapse (89962997):  Resolved.  H. pylori infection (7832687717):  Resolved.   Family History:    Cardiac pacemaker  Father (Jacobo)  Comments:  2017 10:17 AM Leny León  Irregular heart beat  Diabetes mellitus  Mother (Bianka, )  Heart disease  Father (Jacobo)  Comments:  2017 10:38 AM Leny León  Bypass surgery at the age of 40.    2017 10:16 AM Leny León  Irregular heart beat  Grandfather (P)  Comments:  2017 10:35 AM Leny León  Heart attack at the age of 50.  Grandmother (P) ()  Comments:  2017 10:35 AM Leny León  Heart attack at the age of 30.  Mother (Bianka, )  Comments:  2017 10:16 AM Leny León  Bypass graft  High blood pressure  Father (Jacobo)  Mother (Bianka, )  CA - Cancer  Mother (Bianka, )  MS - Multiple sclerosis  Sister  Mother (Bianka, )  Aunt (M)  Colon Polyps  Mother (Bianka, )  Kidney Disease  Mother (Bianka, )  Comments:  2017 10:37 AM Leny León  Kidney failure  Colon cancer  Grandfather (M) ()  Great Uncle (P) ()  Great Grandfather (M)     Procedure history:    Cardiac computed tomography for calcium scoring (5914187428) on 2017 at 42 Years.  Comments:  2017 1:58 PM CST - Shantel Pizano  Total Agatston calcium score is 8.  THR - Total hip replacement (579591779) in 2015 at 39 Years.  Comments:  2017 10:58 AM Onelia Hewitt  left  Pilonidal cyst (9233553134) on  7/17/2012 at 37 Years.  Comments:  12/14/2017 10:41 AM NIKKIE Leny Francisco  Excision.  No abscess.  Nerve conduction study (85764698) on 9/2/2010 at 35 Years.  Cystoscopy (90629945) on 9/14/2009 at 34 Years.  Hysterectomy (324500016) on 6/7/2007 at 32 Years.  Comments:  12/14/2017 10:44 AM NIKKIE Leny Francisco  Right salpingo-oophorectomy.   Social History:        Alcohol Assessment            1 drink every 3 months or more.      Tobacco Assessment            Former smoker, quit more than 30 days ago, Cigarettes, Started age 13 Years.  Stopped age 33 Years.      Employment and Education Assessment            Employed, Work/School description: [employer].            Employed, Work/School description: [employer].      Exercise and Physical Activity Assessment            Exercise frequency: 2-3 x per wk..      Physical Examination   vital signs stable, as noted above   Vital Signs   3/14/2019 5:00 PM CDT Temperature Tympanic 97.8 DegF  LOW    Peripheral Pulse Rate 72 bpm    Systolic Blood Pressure 130 mmHg    Diastolic Blood Pressure 78 mmHg    Mean Arterial Pressure 95 mmHg      Measurements from flowsheet : Measurements   3/14/2019 5:00 PM CDT    Weight Measured - Standard                163.2 lb     General:  Alert and oriented, No acute distress.    Eye:  Extraocular movements are intact.    HENT:  Normocephalic, Oral mucosa is moist.    Neck:  Supple, No carotid bruit.    Respiratory:  Lungs are clear to auscultation, Respirations are non-labored, Breath sounds are equal.    Cardiovascular:  Normal rate, Regular rhythm, No murmur, No edema.    Gastrointestinal:  Soft, Non-tender, Non-distended, Normal bowel sounds, No organomegaly, Rectum: no fistulas.    Musculoskeletal:  Normal range of motion, Normal strength, No tenderness.    Neurologic:  Alert, Oriented, Normal motor function, No focal deficits.    Cognition and Speech:  Oriented, Speech clear and coherent.    Psychiatric:  Appropriate mood & affect.       Review / Management    Results review:  Lab results   3/8/2019 3:00 PM CST Trichrome 1 See comment   3/7/2019 6:00 PM CST Trichrome 1 See comment   3/7/2019 10:23 AM CST Pinworm Prep See comment   3/7/2019 8:39 AM CST TSH 2.61 mIU/L   3/7/2019 8:37 AM CST Sodium Level 140 mmol/L    Potassium Level 4.4 mmol/L    Chloride Level 105 mmol/L    CO2 Level 26 mmol/L    Glucose Level 89 mg/dL    BUN 13 mg/dL    Creatinine 0.78 mg/dL    BUN/Creat Ratio NOT APPLICABLE    eGFR 94 mL/min/1.73m2    eGFR African American 109 mL/min/1.73m2    Calcium Level 9.9 mg/dL    Bili Total 0.6 mg/dL    Alk Phos 59 unit/L    AST/SGOT 20 unit/L    ALT/SGPT 16 unit/L    Protein Total 7.1 gm/dL    Albumin Level 4.7 gm/dL    Globulin 2.4    A/G Ratio 2.0    WBC 4.8    RBC 4.84    Hgb 14.6 gm/dL    Hct 43.0 %    MCV 88.8 fL    MCH 30.2 pg    MCHC 34.0 gm/dL    RDW 12.5 %    Platelet 269    MPV 11.9 fL   .       Impression and Plan   Diagnosis     Bloody diarrhea (TVG68-WA R19.7).     Chronic abdominal pain (CMQ79-MS R10.9).     Chronic nausea (SUK76-OP R11.0).     Weight loss (MWU20-DX R63.4).         Diagnostic work-up review:   10/1/2018: urea breath test positive - subsequently placed on trip drug therapy without symptom improvement   3/7/2019: normal BMP, LFTs, normal TSH, CBC: wnl; no anemia  RUQ US (9/2018): no cholelithiasis; no cholecystitis changes  CT A/P (9/2018): s/p hysterectomy, otherwise unremarkable  HIDA: GB EF 82%; no dyskinetic changes    .) chronic diarrhea with blood and mucous component, unintentional weight loss, and abdominal pains ~ 6 months  - symptom spectrum is concerning for inflammatory bowel disease, especially ulcerative colitis  - will check CRP, sed rate, celiac panel  - check stool culture, C. difficile  - scheduled to see [facility] later this month   - anticipate need for diagnostic endoscopy   - hopeful can expedite work-up with above testing  s

## 2022-02-16 NOTE — PROGRESS NOTES
Patient:   YESSENIA RIDDLE            MRN: [id number]            FIN: [id number]               Age:   43 years     Sex:  Female     :  1975   Associated Diagnoses:   Abdominal pain   Author:   Augustin Chatman MD      Visit Information      Date of Service: 01/15/2019 04:43 pm  Performing Location: [facility]  Encounter#: [id number]      Primary Care Provider (PCP):  Augustin Chatman MD    NPI# [id number]      Referring Provider:  Augustin Chatman MD, NPI# [id number]      Chief Complaint   1/15/2019 4:45 PM CST    right side upper abdominal pain.  HTN check        History of Present Illness   Patient is in today for follow-up on her abdominal pain.  She seemed to be doing better but not perfect after being treated for H. pylori.  Now she is get return of her right upper quadrant pain some nausea hard to eat some days.  Definitely worse with food.  No stool issues no fevers chills or sweats no chest pain or shortness of breath.  No vomiting some nausea intermittently.  She has been working hard on weight loss O.         Review of Systems   Constitutional:  Negative except as documented in history of present illness.    Ear/Nose/Mouth/Throat:  Negative.    Respiratory:  Negative.    Cardiovascular:  Negative.    Gastrointestinal:  Negative except as documented in history of present illness.    Genitourinary:  Negative.    Musculoskeletal:  Negative.    Integumentary:  Negative.    Neurologic:  Negative.       Health Status   Allergies:    Allergic Reactions (Selected)  Severity Not Documented  Metal (No reactions were documented)  No Known Medication Allergies   Medications:  (Selected)   Prescriptions  Prescribed  amLODIPine 2.5 mg oral tablet: = 1 tab(s), Oral, daily, # 30 tab(s), 0 Refill(s), YOHANNES, Type: Maintenance, Pharmacy: [id number], Pt due for appt for further refills  atorvastatin 40 mg oral tablet: = 1 tab(s), Oral, daily, # 30 tab(s), 0 Refill(s), YOHANNES, Type:  Maintenance, Pharmacy: [facility] [id number]  metroNIDAZOLE 500 mg oral tablet: = 1 tab(s) ( 500 mg ), Oral, qid, x 14 day(s), # 56 tab(s), 0 Refill(s), Type: Acute, Pharmacy: [id number], 1 tab(s) Oral qid,x14 day(s)  omeprazole 20 mg oral delayed release capsule: = 1 cap(s) ( 20 mg ), PO, bid, x 30 day(s), # 60 cap(s), 0 Refill(s), Type: Acute, Pharmacy: [id number], 1 cap(s) Oral bid,x30 day(s)  tetracycline 500 mg oral capsule: = 1 cap(s) ( 500 mg ), Oral, qid, x 14 day(s), # 56 cap(s), 0 Refill(s), Type: Acute, Pharmacy: [facility] [id number], 1 cap(s) Oral qid,x14 day(s)  Documented Medications  Documented  Fish Oil 1000 mg oral capsule: = 1 cap(s) ( 1,000 mg ), Oral, daily, 0 Refill(s), Type: Maintenance  aspirin 81 mg oral tablet: 1 tab(s) ( 81 mg ), PO, Daily, # 30 tab(s), 0 Refill(s), Type: Maintenance,    Medications          *denotes recorded medication          amLODIPine 2.5 mg oral tablet: 1 tab(s), Oral, daily, 30 tab(s), 0 Refill(s).          *aspirin 81 mg oral tablet: 81 mg, 1 tab(s), PO, Daily, 30 tab(s), 0 Refill(s).          atorvastatin 40 mg oral tablet: 1 tab(s), Oral, daily, 30 tab(s), 0 Refill(s).          metroNIDAZOLE 500 mg oral tablet: 500 mg, 1 tab(s), Oral, qid, for 14 day(s), 56 tab(s), 0 Refill(s).          *Fish Oil 1000 mg oral capsule: 1,000 mg, 1 cap(s), Oral, daily, 0 Refill(s).          omeprazole 20 mg oral delayed release capsule: 20 mg, 1 cap(s), PO, bid, for 30 day(s), 60 cap(s), 0 Refill(s).          tetracycline 500 mg oral capsule: 500 mg, 1 cap(s), Oral, qid, for 14 day(s), 56 cap(s), 0 Refill(s).     Problem list:    All Problems (Selected)  Abdominal pain / SNOMED CT 80606034 / Confirmed  Allergic rhinitis, unspecified / SNOMED CT 617015710 / Confirmed  Pain in joint involving lower leg / SNOMED CT 201951776 / Confirmed  Back problem / SNOMED CT 251957619 / Confirmed  Gastrocnemius equinus / SNOMED CT 2902944994 /  Confirmed  Disorder of sacrum / SNOMED CT 02975069 / Confirmed  Sleep disturbance / SNOMED CT 19179332 / Confirmed  Dyslipidemia / SNOMED CT 9346247816 / Confirmed  Facet syndrome / SNOMED CT 427552098 / Confirmed  Family history of heart attack / SNOMED CT 859534379 / Confirmed  GERD without esophagitis / SNOMED CT 0936284640 / Confirmed  Hip pain / SNOMED CT 73826251 / Confirmed  Hyperlipidemia, unspecified / SNOMED CT 62911095 / Confirmed  Lipidemia / SNOMED CT 25742528 / Confirmed  Hypertension goal BP (blood pressure) < 130/80 / SNOMED CT 7855821122 / Confirmed  H. pylori infection / SNOMED CT 6894900397 / Confirmed  Knee pain / SNOMED CT 87604091 / Confirmed  Lumbago / SNOMED CT 731879618 / Confirmed  Lumbosacral neuritis / SNOMED CT 854590605 / Confirmed  Lumbosacral spondylosis / SNOMED CT 651070390 / Confirmed  Lumbosacral spondylosis without myelopathy / SNOMED CT 14950337 / Confirmed  Myalgia / SNOMED CT 536858380 / Confirmed  Myofascial pain / SNOMED CT 154286120 / Confirmed  Neuritis / SNOMED CT 838407414 / Confirmed  Nonspecific reaction to tuberculin skin test without active tuberculosis / SNOMED CT 8246669266 / Confirmed  Obesity / SNOMED CT 0065955029 / Probable  Pain in limb / SNOMED CT 897300341 / Confirmed  Leg pain / SNOMED CT 650311353 / Confirmed  Patellar tendinitis / SNOMED CT 77198282 / Confirmed  Patellofemoral syndrome / SNOMED CT 8128042754 / Confirmed  Pes anserinus bursitis / SNOMED CT 492460935 / Confirmed  Plantar fasciitis / SNOMED CT 652108711 / Confirmed  Disturbance of skin sensation / SNOMED CT 158996806 / Confirmed  Uterine prolapse / SNOMED CT 29779450 / Confirmed      Histories   Past Medical History:    Active  Hyperlipidemia, unspecified (14573478): Onset on 7/18/2013 at 38 years.  Disorder of sacrum (31086925): Onset on 1/24/2012 at 36 years.  Lumbosacral spondylosis without myelopathy (88857543): Onset on 1/3/2012 at 36 years.  Back problem (141816783): Onset on 1/3/2012  at 36 years.  Sleep disturbance (72669719): Onset on 6/27/2011 at 36 years.  Myalgia (967540451): Onset on 6/27/2011 at 36 years.  Pain in joint involving lower leg (242155621): Onset on 1/21/2011 at 35 years.  Neuritis (487478505): Onset on 12/3/2010 at 35 years.  Comments:  12/14/2017 CST 9:40 AM Leny León  Thoracic or lumbosacral  Disturbance of skin sensation (854419341): Onset on 11/23/2010 at 35 years.  Lumbago (035728161): Onset on 9/22/2010 at 35 years.  Pain in limb (100479670): Onset on 9/7/2010 at 35 years.  Nonspecific reaction to tuberculin skin test without active tuberculosis (9792298700): Onset on 1/29/2010 at 35 years.  Allergic rhinitis, unspecified (382253643)  Patellar tendinitis (36111470)  Pes anserinus bursitis (151696106)  Knee pain (14723066)  Hip pain (16756974)  Facet syndrome (054219484)  Comments:  12/14/2017 CST 9:50 AM Leny León  Lumbar  Lumbosacral spondylosis (253329572)  Leg pain (407582290)  Patellofemoral syndrome (6311352068)  Gastrocnemius equinus (2711377555)  Dyslipidemia (9691671764)  Lumbosacral neuritis (005118479)  Myofascial pain (955285084)  Plantar fasciitis (339628053)  Uterine prolapse (44622837)  Resolved  Chronic sinusitis, unspecified (04455135): Onset on 2/16/2010 at 35 years.  Resolved.  Other Disorders of Menstruation and Other Abnormal Bleeding from Female Genital Tract (626.8):  Resolved.  Uterine Prolapse without Mention of Vaginal Wall Prolapse (618.1):  Resolved.  Pregnancy:  Resolved.  Tobacco Use Disorder (305.1):  Resolved.  Pilonidal cyst with abscess (422777426):  Resolved.  Pregnancy (340693937):  Resolved in 2004 at 28 years.  Pregnancy (191225459):  Resolved in 2005 at 29 years.  History of tobacco use (8836792047):  Resolved.  Dysfunctional uterine bleeding (09219962):  Resolved.  Pilonidal cyst with abscess (886916178):  Resolved.   Family History:    Cardiac pacemaker  Father (Jacobo)  Comments:  12/14/2017 10:17 AM NIKKIE Cherry  Leny  Irregular heart beat  Diabetes mellitus  Mother (Bianka, )  Heart disease  Father (Jacobo)  Comments:  2017 10:38 AM Leny León  Bypass surgery at the age of 40.    2017 10:16 AM Leny León  Irregular heart beat  Grandfather (P)  Comments:  2017 10:35 AM Leny León  Heart attack at the age of 50.  Grandmother (P) ()  Comments:  2017 10:35 AM Leny León  Heart attack at the age of 30.  Mother (Bianka, )  Comments:  2017 10:16 AM Leny León  Bypass graft  High blood pressure  Father (Jacobo)  Mother (Bianka, )  CA - Cancer  Mother (Bianka, )  MS - Multiple sclerosis  Sister  Mother (Bianka, )  Aunt (M)  Colon Polyps  Mother (Bianka, )  Kidney Disease  Mother (Bianka, )  Comments:  2017 10:37 AM Leny León  Kidney failure  Colon cancer  Grandfather (M) ()  Great Uncle (P) ()  Great Grandfather (M)     Procedure history:    Cardiac computed tomography for calcium scoring (SNOMED CT 8346193580) on 2017 at 42 Years.  Comments:  2017 1:58 PM Shantel Escalante  Total Agatston calcium score is 8.  THR - Total hip replacement (SNOMED CT 895146724) in  at 39 Years.  Comments:  2017 10:58 AM Onelia Hewitt  left  Pilonidal cyst (SNOMED CT 4842203501) on 2012 at 37 Years.  Comments:  2017 10:41 AM Leny León  Excision.  No abscess.  Nerve conduction study (SNOMED CT 45487985) on 2010 at 35 Years.  Cystoscopy (SNOMED CT 78669012) on 2009 at 34 Years.  Hysterectomy (SNOMED CT 762112134) on 2007 at 32 Years.  Comments:  2017 10:44 AM Leny León  Right salpingo-oophorectomy.   Social History:        Alcohol Assessment            1 drink every 3 months or more.      Tobacco Assessment            Former smoker, quit more than 30 days ago, Cigarettes, Started age 13 Years.  Stopped age 33 Years.       Employment and Education Assessment            Employed, Work/School description: [employer].            Employed, Work/School description: [employer].      Exercise and Physical Activity Assessment            Exercise frequency: 2-3 x per wk..      Physical Examination   Vital Signs   1/15/2019 4:45 PM CST Temperature Tympanic 97.3 DegF  LOW    Peripheral Pulse Rate 75 bpm    HR Method Electronic    Systolic Blood Pressure 123 mmHg    Diastolic Blood Pressure 85 mmHg  HI    Mean Arterial Pressure 98 mmHg    BP Method Electronic      Measurements from flowsheet : Measurements   1/15/2019 4:45 PM CST    Weight Measured - Standard                169.6 lb     General:  Alert and oriented, No acute distress.    HENT:  No pharyngeal erythema.    Neck:  Supple, Non-tender.    Respiratory:  Lungs are clear to auscultation, Respirations are non-labored.    Cardiovascular:  Normal rate, Regular rhythm, Normal peripheral perfusion.    Gastrointestinal:  Soft.         Abdomen: Right, Upper quadrant, Tenderness, No guarding, No rebound tenderness.    Genitourinary:  No costovertebral angle tenderness.    Neurologic:  Alert, Oriented.       Impression and Plan   Diagnosis     Abdominal pain ([id number] R10.9).     Course:  Not progressing as expected.    Plan:  Patient with acute on chronic abdominal pain.  With the positive H. pylori we will treat with quadruple therapy to see if this does not give her relief also get HIDA scan these are normal consider EGD.  May need to look at medication as a cause.  Continue to track blood pressure.  She is back after these things are done  .    Patient Instructions:       Counseled: Patient, Regarding diagnosis, Regarding treatment, Regarding medications.

## 2022-02-16 NOTE — PROGRESS NOTES
Patient:   YESSENIA RIDDLE            MRN: [id number]            FIN: [id number]               Age:   44 years     Sex:  Female     :  1975   Associated Diagnoses:   HTN (hypertension); Family history of heart attack; Hyperlipidemia, unspecified   Author:   Augustin Chatman MD      Visit Information      Date of Service: 10/08/2019 04:39 pm  Performing Location: [facility]  Encounter#: [id number]      Primary Care Provider (PCP):  Augustin Chatman MD    NPI# [id number]      Referring Provider:  Augustin Chatman MD# [id number]      Chief Complaint   10/8/2019 4:41 PM CDT    HTN check.  Also bilateral ear pain.        History of Present Illness   chief complaint and symptoms as noted above confirmed with patient   GI issues cleared  lost 50lbs  feels great  meds as drected for lipid and htn      Review of Systems   Constitutional:  Negative except as documented in history of present illness.    Ear/Nose/Mouth/Throat:  Negative.    Respiratory:  Negative.    Cardiovascular:  Negative.    Gastrointestinal:  Negative.    Genitourinary:  Negative.    Musculoskeletal:  Negative.    Integumentary:  Negative.    Neurologic:  Negative.       Health Status   Allergies:    Allergic Reactions (Selected)  Severity Not Documented  Metal (No reactions were documented)  No Known Medication Allergies   Medications:  (Selected)   Prescriptions  Prescribed  amLODIPine 5 mg oral tablet: = 1 tab(s) ( 5 mg ), Oral, daily, # 90 tab(s), 1 Refill(s), Type: Maintenance, [facility]: [id number], 1 tab(s) Oral daily  atorvastatin 40 mg oral tablet: = 1 tab(s), Oral, daily, # 90 tab(s), 1 Refill(s), Type: Maintenance, [facility]acy: [facility] #[id number], 1 tab(s) Oral daily  ofloxacin 0.3% ophthalmic solution: 2 drop(s), Eye-Both, qid, # 10 mL, 0 Refill(s), Type: Maintenance, Pharmacy: "[facility] #[id number], 2 drop(s) Eye-Both qid,x10 day(s)  Documented Medications  Documented  Fish Oil 1000 mg  oral capsule: = 1 cap(s) ( 1,000 mg ), Oral, daily, 0 Refill(s), Type: Maintenance  aspirin 81 mg oral tablet: 1 tab(s) ( 81 mg ), PO, Daily, # 30 tab(s), 0 Refill(s), Type: Maintenance,    Medications          *denotes recorded medication          amLODIPine 5 mg oral tablet: 5 mg, 1 tab(s), Oral, daily, 90 tab(s), 1 Refill(s).          *aspirin 81 mg oral tablet: 81 mg, 1 tab(s), PO, Daily, 30 tab(s), 0 Refill(s).          atorvastatin 40 mg oral tablet: 1 tab(s), Oral, daily, 90 tab(s), 1 Refill(s).          ofloxacin 0.3% ophthalmic solution: 2 drop(s), Eye-Both, qid, for 10 day(s), 10 mL, 0 Refill(s).          *Fish Oil 1000 mg oral capsule: 1,000 mg, 1 cap(s), Oral, daily, 0 Refill(s).       Problem list:    All Problems  Abdominal pain / SNOMED CT 98744762 / Confirmed  Allergic rhinitis, unspecified / SNOMED CT 794484100 / Confirmed  Gastrocnemius equinus / SNOMED CT 6638270272 / Confirmed  Sleep disturbance / SNOMED CT 22623115 / Confirmed  Family history of heart attack / SNOMED CT 722674804 / Confirmed  GERD without esophagitis / SNOMED CT 9276186854 / Confirmed  Hyperlipidemia, unspecified / SNOMED CT 32505980 / Confirmed  Hypertension goal BP (blood pressure) < 130/80 / SNOMED CT 7866317354 / Confirmed  Lumbosacral neuritis / SNOMED CT 161884064 / Confirmed  Lumbosacral spondylosis without myelopathy / SNOMED CT 99906014 / Confirmed  Myofascial pain / SNOMED CT 779139731 / Confirmed  Nonspecific reaction to tuberculin skin test without active tuberculosis / SNOMED CT 5135819003 / Confirmed  Obesity / SNOMED CT 5931179158 / Probable  Resolved: Pain in joint involving lower leg / SNOMED CT 542172291  Resolved: Back problem / SNOMED CT 552924717  Resolved: Chronic sinusitis, unspecified / SNOMED CT 29738262  Resolved: Disorder of sacrum / SNOMED CT 66718522  Resolved: Dysfunctional uterine bleeding / SNOMED CT 29415839  Resolved: Dyslipidemia / SNOMED CT 5525125146  Resolved: Facet syndrome / SNOMED CT  488679973  Resolved: Hip pain / SNOMED CT 45826887  Resolved: History of tobacco use / SNOMED CT 4121500149  Resolved: Lipidemia / SNOMED CT 79275245  Resolved: H. pylori infection / SNOMED CT 9015482222  Resolved: Knee pain / SNOMED CT 89454177  Resolved: Lumbago / SNOMED CT 536692764  Resolved: Lumbosacral spondylosis / SNOMED CT 333068098  Resolved: Myalgia / SNOMED CT 817881403  Resolved: Neuritis / SNOMED CT 423096414  Resolved: Other Disorders of Menstruation and Other Abnormal Bleeding from Female Genital Tract / ICD-9-.8  Resolved: Pain in limb / SNOMED CT 846115822  Resolved: Leg pain / SNOMED CT 057522471  Resolved: Patellar tendinitis / SNOMED CT 88654639  Resolved: Patellofemoral syndrome / SNOMED CT 6805450278  Resolved: Pes anserinus bursitis / SNOMED CT 001846234  Resolved: Pilonidal cyst with abscess / SNOMED CT 031703368  Resolved: Pilonidal cyst with abscess / SNOMED CT 317363384  Resolved: Plantar fasciitis / SNOMED CT 109838143  Resolved: Pregnancy  Resolved: Pregnancy / SNOMED CT 372418872  Resolved: Pregnancy / SNOMED CT 655969330  Resolved: Disturbance of skin sensation / SNOMED CT 647781965  Resolved: Tobacco Use Disorder / ICD-9-.1  Resolved: Uterine prolapse / SNOMED CT 48734649  Resolved: Uterine Prolapse without Mention of Vaginal Wall Prolapse / ICD-9-.1  Canceled: Family History of Other Cardiovascular Diseases / ICD-9-CM V17.49  Canceled: Obesity / SNOMED CT 1615359851      Histories   Past Medical History:    Active  Hyperlipidemia, unspecified (28371533): Onset on 7/18/2013 at 38 years.  Lumbosacral spondylosis without myelopathy (12978383): Onset on 1/3/2012 at 36 years.  Sleep disturbance (04747215): Onset on 6/27/2011 at 36 years.  Nonspecific reaction to tuberculin skin test without active tuberculosis (8467291026): Onset on 1/29/2010 at 35 years.  Allergic rhinitis, unspecified (569205791)  Gastrocnemius equinus (9732122068)  Lumbosacral neuritis  (456716120)  Myofascial pain (398642303)  Resolved  Disorder of sacrum (76112108): Onset on 1/24/2012 at 36 years.  Resolved.  Back problem (642819425): Onset on 1/3/2012 at 36 years.  Resolved.  Myalgia (291255891): Onset on 6/27/2011 at 36 years.  Resolved.  Pain in joint involving lower leg (322539154): Onset on 1/21/2011 at 35 years.  Resolved.  Neuritis (040005851): Onset on 12/3/2010 at 35 years.  Resolved.  Comments:  12/14/2017 CST 9:40 AM Leny León  Thoracic or lumbosacral  Disturbance of skin sensation (043065312): Onset on 11/23/2010 at 35 years.  Resolved.  Lumbago (557290070): Onset on 9/22/2010 at 35 years.  Resolved.  Pain in limb (576891414): Onset on 9/7/2010 at 35 years.  Resolved.  Chronic sinusitis, unspecified (18370560): Onset on 2/16/2010 at 35 years.  Resolved.  Other Disorders of Menstruation and Other Abnormal Bleeding from Female Genital Tract (626.8):  Resolved.  Uterine Prolapse without Mention of Vaginal Wall Prolapse (618.1):  Resolved.  Pregnancy:  Resolved.  Tobacco Use Disorder (305.1):  Resolved.  Patellar tendinitis (67212143):  Resolved.  Pes anserinus bursitis (126233934):  Resolved.  Pilonidal cyst with abscess (760081425):  Resolved.  Pregnancy (629540531):  Resolved in 2004 at 28 years.  Pregnancy (410829195):  Resolved in 2005 at 29 years.  Lipidemia (70749152):  Resolved.  Knee pain (45164343):  Resolved.  Hip pain (50988157):  Resolved on 3/7/2019 at 44 years.  Facet syndrome (701949204):  Resolved.  Comments:  12/14/2017 CST 9:50 AM Leny León  Lumbar  Lumbosacral spondylosis (517392366):  Resolved.  Leg pain (085606550):  Resolved.  Patellofemoral syndrome (4540258351):  Resolved.  Dyslipidemia (6582406660):  Resolved.  Plantar fasciitis (433921099):  Resolved.  History of tobacco use (2189252378):  Resolved.  Dysfunctional uterine bleeding (03358566):  Resolved.  Pilonidal cyst with abscess (780308783):  Resolved.  Uterine prolapse (16648037):   Resolved.  H. pylori infection (0153656203):  Resolved.   Family History:    Cardiac pacemaker  Father (Jacobo)  Comments:  2017 10:17 AM CST Leny Francisco  Irregular heart beat  Diabetes mellitus  Mother (Bianka, )  Heart disease  Father (Jacobo)  Comments:  2017 10:38 AM CST Leny Francisco  Bypass surgery at the age of 40.    2017 10:16 AM CST Leny Francisco  Irregular heart beat  Grandfather (P)  Comments:  2017 10:35 AM CST Leny Francisco  Heart attack at the age of 50.  Grandmother (P) ()  Comments:  2017 10:35 AM CST Leny Francisco  Heart attack at the age of 30.  Mother (Bianka, )  Comments:  2017 10:16 AM Leny León  Bypass graft  High blood pressure  Father (Jacobo)  Mother (Bianka, )  CA - Cancer  Mother (Bianka, )  MS - Multiple sclerosis  Sister  Mother (Bianka, )  Aunt (M)  Colon Polyps  Mother (Bianka, )  Kidney Disease  Mother (Bianka, )  Comments:  2017 10:37 AM Leny León  Kidney failure  Colon cancer  Grandfather (M) ()  Great Uncle (P) ()  Great Grandfather (M)     Procedure history:    Cardiac computed tomography for calcium scoring (SNOMED CT 7013210466) on 2017 at 42 Years.  Comments:  2017 1:58 PM CST - Shantel Pizano  Total Agatston calcium score is 8.  THR - Total hip replacement (SNOMED CT 362284707) in  at 39 Years.  Comments:  2017 10:58 AM NIKKIE - Onelia Espino  left  Pilonidal cyst (SNOMED CT 2246611174) on 2012 at 37 Years.  Comments:  2017 10:41 AM Leny León  Excision.  No abscess.  Nerve conduction study (SNOMED CT 51908966) on 2010 at 35 Years.  Cystoscopy (SNOMED CT 47821450) on 2009 at 34 Years.  Hysterectomy (SNOMED CT 239836631) on 2007 at 32 Years.  Comments:  2017 10:44 AM Leny León  Right salpingo-oophorectomy.   Social History:        Alcohol Assessment            1 drink every 3  months or more.      Tobacco Assessment            Former smoker, quit more than 30 days ago, Cigarettes, Started age 13 Years.  Stopped age 33 Years.      Employment and Education Assessment            Employed, Work/School description: CMA.            Employed, Work/School description: CMA.      Exercise and Physical Activity Assessment            Exercise frequency: 2-3 x per wk..        Physical Examination   Vital Signs   10/8/2019 4:41 PM CDT Temperature Tympanic 97.7 DegF  LOW    Peripheral Pulse Rate 79 bpm    HR Method Electronic    Systolic Blood Pressure 146 mmHg  HI    Diastolic Blood Pressure 89 mmHg  HI    Mean Arterial Pressure 108 mmHg    BP Method Electronic      Measurements from flowsheet : Measurements   10/8/2019 4:41 PM CDT Height Measured - Standard 63 in    Weight Measured - Standard 143.0 lb    BSA 1.7 m2    Body Mass Index 25.33 kg/m2  HI      General:  Alert and oriented, No acute distress.    Neck:  Supple, Non-tender.    Respiratory:  Lungs are clear to auscultation, Respirations are non-labored.    Cardiovascular:  Normal rate, Regular rhythm.    Gastrointestinal:  Soft, Non-tender, No organomegaly.    Neurologic:  Alert, Oriented.       Health Maintenance      Recommendations     Pending (in the next year)        OverDue           Alcohol Misuse Screen (Female) due  11/22/18  and every 1  year(s)           Depression Screen (Female) due  11/22/18  and every 1  year(s)        Due            HIV Screen (if sexually active) (Female) due  10/08/19  and every 1  year(s)           STD Counseling (if sexually active) (Female) due  10/08/19  and every 1  year(s)           Syphilis Screen (if sexually active) (Female) due  10/08/19  and every 1  year(s)           Type 2 Diabetes Mellitus Screen (Female) due  10/08/19  Variable frequency        Due In Future            Influenza Vaccine not due until  09/01/20  and every 1  year(s)           Lipid Disorders Screen (Female) not due until   10/07/20  and every 1  year(s)     Satisfied (in the past 1 year)        Satisfied            Body Mass Index Check (Female) on  10/08/19.           High Blood Pressure Screen (Female) on  10/08/19.           High Blood Pressure Screen (Female) on  03/14/19.           High Blood Pressure Screen (Female) on  03/07/19.           High Blood Pressure Screen (Female) on  01/15/19.           Influenza Vaccine on  10/08/19.           Lipid Disorders Screen (Female) on  10/07/19.           Lipid Disorders Screen (Female) on  10/07/19.           Lipid Disorders Screen (Female) on  10/07/19.           Lipid Disorders Screen (Female) on  10/07/19.           Obesity Screen and Counseling (Female) on  10/08/19.           Obesity Screen and Counseling (Female) on  03/14/19.           Obesity Screen and Counseling (Female) on  03/07/19.           Obesity Screen and Counseling (Female) on  01/15/19.           Tobacco Use Screen (Female) on  10/08/19.           Tobacco Use Screen (Female) on  03/14/19.           Tobacco Use Screen (Female) on  01/15/19.          Impression and Plan   Diagnosis     HTN (hypertension) (SIJ20-QW I10).     Family history of heart attack (YYZ53-NH Z82.49).     Hyperlipidemia, unspecified (TWX25-RL E78.5).     Course:  Progressing as expected.    Plan:  increase norvasc  rech 6 mo  .    Patient Instructions:       Counseled: Patient, Regarding diagnosis, Regarding treatment, Regarding medications, Diet, Activity.

## 2022-02-16 NOTE — LETTER
(Inserted Image. Unable to display)     November 20, 2020      YESSENIA RIDDLE  [address]  [place], [place] [id number]          Dear YESSENIA,      Thank you for selecting [facility] (previously [facility] for your healthcare needs.    Our records indicate you are due for the following services:    Hypertension check ~ please remember to bring your at-home blood pressure readings with you to your appointment.   Fasting Lab Tests ~ Please do not eat or drink anything 10 hours prior to your scheduled appointment time.  (Water and any medications that you may need are allowed unless directed otherwise.)    If you had your labs done at another facility or with Direct Access Lab Testing at [facility], please bring in a copy of the results to your next visit, mail a copy, or drop off a copy of your results to your Healthcare Provider.    (FYI   Regarding office visits: In some instances, a video visit or telephone visit may be offered as an option.)    You are due for lab work and an office visit; please schedule the lab appointment 1 week before the office visit.  This will assure all results are available to discuss with your Healthcare Provider during your visit.    **It is very helpful if you bring your medication bottles to your appointment.  This assures we have all of your current medications, including strength and dosing information, documented accurately in your medical record.    To schedule an appointment or if you have further questions, please contact your clinic at [phone].      Powered by [employer]    Sincerely,    Augustin Chatman MD

## 2022-02-16 NOTE — NURSING NOTE
"Comprehensive Intake Entered On:  11/8/2019 4:47 PM CST    Performed On:  11/8/2019 4:40 PM CST by Dallas Rodriges CMA               Summary   Chief Complaint :   Pt here for pain \"across both shoulder\" that radiates into neck and head x 2 weeks.  Pt states the pain is getting worse in left shoulder and into neck x 3 days.   Weight Measured :   143 lb(Converted to: 143 lb 0 oz, 64.86 kg)    Height Measured :   63 in(Converted to: 5 ft 3 in, 160.02 cm)    Body Mass Index :   25.33 kg/m2 (HI)    Body Surface Area :   1.7 m2   Systolic Blood Pressure :   154 mmHg (HI)    Diastolic Blood Pressure :   100 mmHg (HI)    Mean Arterial Pressure :   118 mmHg   Peripheral Pulse Rate :   64 bpm   BP Site :   Right arm   Pulse Site :   Radial artery   Temperature Tympanic :   98.1 DegF(Converted to: 36.7 DegC)    Respiratory Rate :   16 br/min   Dallas Rodriges CMA - 11/8/2019 4:40 PM CST   Health Status   Allergies Verified? :   Yes   Medication History Verified? :   No   Medical History Verified? :   Yes   Pre-Visit Planning Status :   Not completed   Tobacco Use? :   Former smoker   Dallas Rodriges CMA - 11/8/2019 4:40 PM CST   Meds / Allergies   (As Of: 11/8/2019 4:47:23 PM CST)   Allergies (Active)   Metal  Estimated Onset Date:   Unspecified ; Created By:   Leny Cherry; Reaction Status:   Active ; Category:   Other ; Substance:   Metal ; Type:   Allergy ; Updated By:   Leny Cherry; Reviewed Date:   11/8/2019 4:44 PM CST      No Known Medication Allergies  Estimated Onset Date:   Unspecified ; Created By:   Rere Case; Reaction Status:   Active ; Category:   Drug ; Substance:   No Known Medication Allergies ; Type:   Allergy ; Updated By:   Rere Case; Reviewed Date:   11/8/2019 4:44 PM CST        Medication List   (As Of: 11/8/2019 4:47:23 PM CST)   Prescription/Discharge Order    ofloxacin ophthalmic  :   ofloxacin ophthalmic ; Status:   Processing ; Ordered As Mnemonic:   ofloxacin 0.3% ophthalmic solution ; " Ordering Provider:   Augustin Chatman MD; Action Display:   Complete ; Catalog Code:   ofloxacin ophthalmic ; Order Dt/Tm:   11/8/2019 4:41:12 PM CST          atorvastatin  :   atorvastatin ; Status:   Prescribed ; Ordered As Mnemonic:   atorvastatin 40 mg oral tablet ; Simple Display Line:   1 tab(s), Oral, daily, 90 tab(s), 1 Refill(s) ; Ordering Provider:   Augustin Chatman MD; Catalog Code:   atorvastatin ; Order Dt/Tm:   10/8/2019 5:03:22 PM CDT          amLODIPine  :   amLODIPine ; Status:   Prescribed ; Ordered As Mnemonic:   amLODIPine 5 mg oral tablet ; Simple Display Line:   5 mg, 1 tab(s), Oral, daily, 90 tab(s), 1 Refill(s) ; Ordering Provider:   Augustin Chatman MD; Catalog Code:   amLODIPine ; Order Dt/Tm:   10/8/2019 5:03:21 PM CDT            Home Meds    omega-3 polyunsaturated fatty acids  :   omega-3 polyunsaturated fatty acids ; Status:   Documented ; Ordered As Mnemonic:   Fish Oil 1000 mg oral capsule ; Simple Display Line:   1,000 mg, 1 cap(s), Oral, daily, 0 Refill(s) ; Catalog Code:   omega-3 polyunsaturated fatty acids ; Order Dt/Tm:   9/25/2018 5:25:20 PM CDT          aspirin  :   aspirin ; Status:   Documented ; Ordered As Mnemonic:   aspirin 81 mg oral tablet ; Simple Display Line:   81 mg, 1 tab(s), PO, Daily, 30 tab(s), 0 Refill(s) ; Catalog Code:   aspirin ; Order Dt/Tm:   7/12/2018 8:41:06 AM CDT            Social History   Social History   (As Of: 11/8/2019 4:47:23 PM CST)   Alcohol:        1 drink every 3 months or more.   (Last Updated: 11/24/2017 11:00:46 AM CST by Onelia Espino)          Tobacco:        Former smoker, quit more than 30 days ago, Cigarettes, Started age 13 Years.  Stopped age 33 Years.   (Last Updated: 11/22/2017 2:19:46 PM CST by Rere Case)          Employment/School:        Employed, Work/School description: CMA.   (Last Updated: 11/24/2017 10:50:50 AM CST by Onelia Espino)   Employed, Work/School description: CMA.   (Last Updated: 11/24/2017  10:55:17 AM CST by Onelia Espino)          Exercise:        Exercise frequency: 2-3 x per wk..   (Last Updated: 11/24/2017 11:01:10 AM CST by Onelia Espino)

## 2022-02-16 NOTE — PROGRESS NOTES
Patient:   YESSENIA RIDDLE            MRN: [id number]            FIN: [id number]               Age:   45 years     Sex:  Female     :  1975   Associated Diagnoses:   Acute cervical radiculopathy   Author:   Augustin Chatman MD      Visit Information      Date of Service: 2020 06:50 pm  Performing Location: [facility]  Encounter#: [id number]      Primary Care Provider (PCP):  Augustin Chatman MD    NPI# [id number]      Referring Provider:  Augustin Chatman MD    NPI# [id number]      Chief Complaint   2020 6:52 PM CST    Back/neck/shoulder and lung pain.        History of Present Illness   Patient is here for neck pain.  She notes the last several days she has had neck pain.  Is been over her right neck rating to her right shoulder and upper arm and some to the right upper anterior chest.  It is there all the time worse when she tries to get comfortable in bed.  Does not stop her from doing things during the day.  Worse with a deep breath.  No numbness tingling weakness in her extremities no other chest pain no shortness of breath or cough.  No treatment up to now.         Review of Systems   Constitutional:  Negative except as documented in history of present illness.    Ear/Nose/Mouth/Throat:  Negative.    Respiratory:  Negative except as documented in history of present illness.    Cardiovascular:  Negative.    Musculoskeletal:  Negative except as documented in history of present illness.    Integumentary:  Negative.    Neurologic:  Negative except as documented in history of present illness.       Health Status   Allergies:    Allergic Reactions (Selected)  Severity Not Documented  Metal (No reactions were documented)  No Known Medication Allergies   Medications:  (Selected)   Prescriptions  Prescribed  amLODIPine 5 mg oral tablet: = 1 tab(s) ( 5 mg ), Oral, daily, # 90 tab(s), 1 Refill(s), Type: Maintenance, Pharmacy: [facility] #[id number], 1 tab(s) Oral daily  atorvastatin  40 mg oral tablet: = 1 tab(s), Oral, daily, # 90 tab(s), 1 Refill(s), Type: Maintenance, Pharmacy: [id number], 1 tab(s) Oral daily  predniSONE 10 mg oral tablet: 4 tabs daily for 3 days taper bey 1 tab every 3 days, Oral, daily, # 30 tab(s), 0 Refill(s), Type: Maintenance, Pharmacy: [facility] #[id number], 4 tabs daily for 3 days taper bey 1 tab every 3 days Oral daily  Documented Medications  Documented  Fish Oil 1000 mg oral capsule: = 1 cap(s) ( 1,000 mg ), Oral, daily, 0 Refill(s), Type: Maintenance  aspirin 81 mg oral tablet: 1 tab(s) ( 81 mg ), PO, Daily, # 30 tab(s), 0 Refill(s), Type: Maintenance,    Medications          *denotes recorded medication          amLODIPine 5 mg oral tablet: 5 mg, 1 tab(s), Oral, daily, 90 tab(s), 1 Refill(s).          *aspirin 81 mg oral tablet: 81 mg, 1 tab(s), PO, Daily, 30 tab(s), 0 Refill(s).          atorvastatin 40 mg oral tablet: 1 tab(s), Oral, daily, 90 tab(s), 1 Refill(s).          *Fish Oil 1000 mg oral capsule: 1,000 mg, 1 cap(s), Oral, daily, 0 Refill(s).          predniSONE 10 mg oral tablet: 4 tabs daily for 3 days taper bey 1 tab every 3 days, Oral, daily, 30 tab(s), 0 Refill(s).       Problem list:    All Problems (Selected)  Abdominal pain / SNOMED CT 01125037 / Confirmed  Allergic rhinitis, unspecified / SNOMED CT 228734930 / Confirmed  Gastrocnemius equinus / SNOMED CT 1823563733 / Confirmed  Sleep disturbance / SNOMED CT 17147718 / Confirmed  Family history of heart attack / SNOMED CT 172807101 / Confirmed  GERD without esophagitis / SNOMED CT 6040517276 / Confirmed  Hyperlipidemia, unspecified / SNOMED CT 52277447 / Confirmed  Hypertension goal BP (blood pressure) < 130/80 / SNOMED CT 9058195098 / Confirmed  Lumbosacral neuritis / SNOMED CT 199012380 / Confirmed  Lumbosacral spondylosis without myelopathy / SNOMED CT 00673096 / Confirmed  Myofascial pain / SNOMED CT 627812308 / Confirmed  Nonspecific reaction to tuberculin skin test  without active tuberculosis / SNOMED CT 7949188787 / Confirmed  Obesity / SNOMED CT 0986742713 / Probable      Histories   Past Medical History:    Active  Hyperlipidemia, unspecified (66815798): Onset on 7/18/2013 at 38 years.  Lumbosacral spondylosis without myelopathy (90266360): Onset on 1/3/2012 at 36 years.  Sleep disturbance (29502891): Onset on 6/27/2011 at 36 years.  Nonspecific reaction to tuberculin skin test without active tuberculosis (1502235366): Onset on 1/29/2010 at 35 years.  Allergic rhinitis, unspecified (353238751)  Gastrocnemius equinus (6308600959)  Lumbosacral neuritis (196039544)  Myofascial pain (957494668)  Resolved  Disorder of sacrum (13255546): Onset on 1/24/2012 at 36 years.  Resolved.  Back problem (246254625): Onset on 1/3/2012 at 36 years.  Resolved.  Myalgia (209469644): Onset on 6/27/2011 at 36 years.  Resolved.  Pain in joint involving lower leg (402276592): Onset on 1/21/2011 at 35 years.  Resolved.  Neuritis (667113053): Onset on 12/3/2010 at 35 years.  Resolved.  Comments:  12/14/2017 CST 9:40 AM CST - Leny Cherry  Thoracic or lumbosacral  Disturbance of skin sensation (713205214): Onset on 11/23/2010 at 35 years.  Resolved.  Lumbago (495901729): Onset on 9/22/2010 at 35 years.  Resolved.  Pain in limb (163998249): Onset on 9/7/2010 at 35 years.  Resolved.  Chronic sinusitis, unspecified (24957054): Onset on 2/16/2010 at 35 years.  Resolved.  Other Disorders of Menstruation and Other Abnormal Bleeding from Female Genital Tract (626.8):  Resolved.  Uterine Prolapse without Mention of Vaginal Wall Prolapse (618.1):  Resolved.  Pregnancy:  Resolved.  Tobacco Use Disorder (305.1):  Resolved.  Patellar tendinitis (17344357):  Resolved.  Pes anserinus bursitis (111071016):  Resolved.  Pilonidal cyst with abscess (267021801):  Resolved.  Pregnancy (670716954):  Resolved in 2004 at 28 years.  Pregnancy (379345695):  Resolved in 2005 at 29 years.  Lipidemia (35674640):  Resolved.  Knee  pain (77091218):  Resolved.  Hip pain (42432848):  Resolved on 3/7/2019 at 44 years.  Facet syndrome (152624679):  Resolved.  Comments:  2017 CST 9:50 AM Leny León  Lumbar  Lumbosacral spondylosis (217697083):  Resolved.  Leg pain (622586215):  Resolved.  Patellofemoral syndrome (6673440320):  Resolved.  Dyslipidemia (5925553605):  Resolved.  Plantar fasciitis (058421415):  Resolved.  History of tobacco use (6640033837):  Resolved.  Dysfunctional uterine bleeding (83483625):  Resolved.  Pilonidal cyst with abscess (749711638):  Resolved.  Uterine prolapse (31345838):  Resolved.  H. pylori infection (2738014192):  Resolved.   Family History:    Cardiac pacemaker  Father (Jacobo)  Comments:  2017 10:17 AM Leny León  Irregular heart beat  Diabetes mellitus  Mother (Bianka, )  Heart disease  Father (Jacobo)  Comments:  2017 10:38 AM Leny León  Bypass surgery at the age of 40.    2017 10:16 AM Leny León  Irregular heart beat  Grandfather (P)  Comments:  2017 10:35 AM Leny León  Heart attack at the age of 50.  Grandmother (P) ()  Comments:  2017 10:35 AM Leny León  Heart attack at the age of 30.  Mother (Bianka, )  Comments:  2017 10:16 AM Leny León  Bypass graft  High blood pressure  Father (Jacobo)  Mother (Bianka, )  CA - Cancer  Mother (Bianka, )  MS - Multiple sclerosis  Sister  Mother (Bianka, )  Aunt (M)  Colon Polyps  Mother (Bianka, )  Kidney Disease  Mother (Bianka, )  Comments:  2017 10:37 AM Leny León  Kidney failure  Colon cancer  Grandfather (M) ()  Great Uncle (P) ()  Great Grandfather (M)     Procedure history:    Cardiac computed tomography for calcium scoring (SNOMED CT 2113044127) on 2017 at 42 Years.  Comments:  2017 1:58 PM CST - Shantel Pizano  Total Agatston calcium score is 8.  [facility] in 2015 at 39 Years.  Comments:  11/24/2017 10:58 AM CST - Onelia Espino  left  Pilonidal cyst (SNOMED CT 8709798759) on 7/17/2012 at 37 Years.  Comments:  12/14/2017 10:41 AM CST - Leny Cherry  Excision.  No abscess.  Nerve conduction study ([facility] CT 99260039) on 9/2/2010 at 35 Years.  [facility]) on 9/14/2009 at 34 Years.  [facility]) on 6/7/2007 at 32 Years.  Comments:  12/14/2017 10:44 AM CST - Leny Cherry  Right salpingo-oophorectomy.   Social History:        Alcohol Assessment            1 drink every 3 months or more.      Tobacco Assessment            Former smoker, quit more than 30 days ago, Cigarettes, Started age 13 Years.  Stopped age 33 Years.      Employment and Education Assessment            Employed, Work/School description: [employer].            Employed, Work/School description: [employer].      Exercise and Physical Activity Assessment            Exercise frequency: 2-3 x per wk..        Physical Examination   Vital Signs   2/18/2020 6:52 PM CST Temperature Tympanic 97.1 DegF  LOW    Peripheral Pulse Rate 64 bpm    HR Method Electronic    Systolic Blood Pressure 130 mmHg    Diastolic Blood Pressure 78 mmHg    Mean Arterial Pressure 95 mmHg    BP Method Electronic      Measurements from flowsheet : Measurements   2/18/2020 6:52 PM CST Height Measured - Standard 63 in    Weight Measured - Standard 136.2 lb    BSA 1.66 m2    Body Mass Index 24.12 kg/m2      General:  Alert and oriented, No acute distress.    Neck:  Supple, No lymphadenopathy, No thyromegaly.    Respiratory:  Lungs are clear to auscultation, Respirations are non-labored, Breath sounds are equal.    Cardiovascular:  Normal rate, Regular rhythm, No murmur, No edema.    Gastrointestinal:  Soft, Non-tender.    Musculoskeletal:  She has full range of motion of her cervical spine but reproduction of her symptoms with neck flexion and rotation of the right.  She is intact  upper extremity DTR reflexes strength and sensation light touch.  No significant chest wall tenderness  .    Neurologic:  Alert, Oriented.       Impression and Plan   Diagnosis     Acute cervical radiculopathy ([id number] M54.12).     Course:  Not progressing as expected.    Plan:  Based on location of her pain quality and findings on exam likely a cervical disc problem.  We will go ahead and put her on a prednisone taper.  She will use Tylenol and/or ibuprofen as well as ice gentle range of motion she is not improving over the next 3 to 4 days consider further evaluation.  Call if worse  .    Patient Instructions:       Counseled: Patient, Regarding diagnosis, Regarding treatment, Regarding medications, Activity.

## 2022-02-16 NOTE — TELEPHONE ENCOUNTER
---------------------  From: Augustin Chatman MD   To: Rere Case;     Sent: 3/20/2020 12:31:25 PM CDT  Subject: General Message     let her know mr show 2 small disc herniations(likely old) but nothing is toughing spinal cord or nerves to cause pain other than neck pain.  see me in a few weeks if still having sxstouching---------------------  From: Rere Case   To: [facility] Message Pool ([id number] [facility] [place]);     Sent: 3/20/2020 1:36:15 PM CDT  Subject: Result: MRI     Left message for patient to call back at: 1:35pmPt called back Friday 3/20/20 at 5:03pm.  Called pt back 3/23/20 at 8:01am and gave message from below.    Pt c/o ongoing pain and PT making pain worse. Would like to talk with TFS about pain and not wait a few weeks like he recommended. Pt is aware TFS is OC today.---------------------  From: Rere Case ([address] Message Pool ([id number]))   To: Augustin Chatman MD;     Sent: 3/23/2020 9:41:42 AM CDT  Subject: FW: Result: MRI

## 2022-02-16 NOTE — PROGRESS NOTES
Patient:   YESSENIA RIDDLE            MRN: [id number]            FIN: [id number]               Age:   44 years     Sex:  Female     :  1975   Associated Diagnoses:   HTN (hypertension); Family history of heart attack; Hyperlipidemia, unspecified; Abnormal loss of weight; Abdominal pain   Author:   Augustin Chatman MD      Visit Information      Date of Service: 2019 07:49 am  Performing Location: [facility]  Encounter#: [id number]      Primary Care Provider (PCP):  Augustin Chatman MD    NPI# [id number]      Referring Provider:  Augustin Chatman MD# [id number]      Chief Complaint   3/7/2019 7:56 AM CST     Labs        History of Present Illness   chief complaint and symptoms as noted above confirmed with patient   Patient in today for follow-up on her hyperlipidemia and other hypertension taking meds as directed.  She is still struggling with her abdominal pain and with her stool changes.  She is seeing GI every month trying to get to the bowel movement.  She is dropped almost 30 pounds.  He has no appetite.  Denies any vomiting no.  Stools are intermittently loose.  Before all this started she would only have bowel movements once a week.  She has had no symptoms for many months.  She has had CT of her abdomen ultrasound and a nuclear medicine gallbladder study.  She has had endoscopy she did have H pylori at one time.  She thinks she saw pinworms in her stool last week is sending stool in for this now.         Review of Systems   Constitutional:  Negative except as documented in history of present illness.    Ear/Nose/Mouth/Throat:  Negative except as documented in history of present illness.    Respiratory:  Negative.    Cardiovascular:  Negative.    Gastrointestinal:  Negative except as documented in history of present illness.    Genitourinary:  Negative.    Musculoskeletal:  Negative.    Integumentary:  Negative.    Neurologic:  Negative.       Health Status   Allergies:     Allergic Reactions (Selected)  Severity Not Documented  Metal (No reactions were documented)  No Known Medication Allergies   Medications:  (Selected)   Prescriptions  Prescribed  albendazole 200 mg oral tablet: = 2 tab(s) ( 400 mg ), Oral, once, Instructions: repeat in 2 weeks, # 4 tab(s), 0 Refill(s), Type: Soft Stop, Pharmacy: [id number], 2 tab(s) Oral once,Instr:repeat in 2 weeks  amLODIPine 2.5 mg oral tablet: = 1 tab(s), Oral, daily, # 30 tab(s), 0 Refill(s), Type: Maintenance, Pharmacy: [id number], Needs appt for further refills, 1 tab(s) Oral daily  atorvastatin 40 mg oral tablet: = 1 tab(s), Oral, daily, # 30 tab(s), 0 Refill(s), Type: Maintenance, Pharmacy: [facility], Needs appt for further refills  omeprazole 20 mg oral delayed release capsule: = 1 cap(s), Oral, bid, # 60 cap(s), 0 Refill(s), Type: Maintenance, Pharmacy: [id number], Needs appt for further refills  ondansetron 8 mg oral tablet, disintegrating: = 1 tab(s), Oral, q8 hrs, PRN: AS NEEDED FOR NAUSEA OR VOMITING, # 12 tab(s), 0 Refill(s), Type: Soft Stop, Pharmacy: [id number], 1 tab(s) Oral q8 hrs,PRN:AS NEEDED FOR NAUSEA OR VOMITING  Documented Medications  Documented  Fish Oil 1000 mg oral capsule: = 1 cap(s) ( 1,000 mg ), Oral, daily, 0 Refill(s), Type: Maintenance  aspirin 81 mg oral tablet: 1 tab(s) ( 81 mg ), PO, Daily, # 30 tab(s), 0 Refill(s), Type: Maintenance,    Medications          *denotes recorded medication          albendazole 200 mg oral tablet: 400 mg, 2 tab(s), Oral, once, repeat in 2 weeks, 4 tab(s), 0 Refill(s).          amLODIPine 2.5 mg oral tablet: 1 tab(s), Oral, daily, 30 tab(s), 0 Refill(s).          *aspirin 81 mg oral tablet: 81 mg, 1 tab(s), PO, Daily, 30 tab(s), 0 Refill(s).          atorvastatin 40 mg oral tablet: 1 tab(s), Oral, daily, 30 tab(s), 0 Refill(s).          *Fish Oil 1000 mg oral capsule: 1,000 mg, 1 cap(s), Oral, daily,  0 Refill(s).          omeprazole 20 mg oral delayed release capsule: 1 cap(s), Oral, bid, 60 cap(s), 0 Refill(s).          ondansetron 8 mg oral tablet, disintegratin tab(s), Oral, q8 hrs, PRN: AS NEEDED FOR NAUSEA OR VOMITING, 12 tab(s), 0 Refill(s).     Problem list:    All Problems  Abdominal pain / SNOMED CT 05037208 / Confirmed  Allergic rhinitis, unspecified / SNOMED CT 011073049 / Confirmed  Gastrocnemius equinus / SNOMED CT 8397245034 / Confirmed  Sleep disturbance / SNOMED CT 87404415 / Confirmed  Family history of heart attack / SNOMED CT 277834910 / Confirmed  GERD without esophagitis / SNOMED CT 9428158382 / Confirmed  Hyperlipidemia, unspecified / SNOMED CT 80453237 / Confirmed  Hypertension goal BP (blood pressure) < 130/80 / SNOMED CT 4048244195 / Confirmed  Lumbosacral neuritis / SNOMED CT 695226043 / Confirmed  Lumbosacral spondylosis without myelopathy / SNOMED CT 37215923 / Confirmed  Myofascial pain / SNOMED CT 984307558 / Confirmed  Nonspecific reaction to tuberculin skin test without active tuberculosis / SNOMED CT 0715519424 / Confirmed  Obesity / SNOMED CT 3691739038 / Probable  Resolved: Pain in joint involving lower leg / SNOMED CT 807982236  Resolved: Back problem / SNOMED CT 425941530  Resolved: Chronic sinusitis, unspecified / SNOMED CT 37033832  Resolved: Disorder of sacrum / SNOMED CT 23327156  Resolved: Dysfunctional uterine bleeding / SNOMED CT 39494301  Resolved: Dyslipidemia / SNOMED CT 4723134169  Resolved: Facet syndrome / SNOMED CT 371770226  Resolved: Hip pain / SNOMED CT 25684338  Resolved: History of tobacco use / SNOMED CT 6030321054  Resolved: Lipidemia / SNOMED CT 64572690  Resolved: H. pylori infection / SNOMED CT 0410505630  Resolved: Knee pain / SNOMED CT 99810354  Resolved: Lumbago / SNOMED CT 959302834  Resolved: Lumbosacral spondylosis / SNOMED CT 244219571  Resolved: Myalgia / SNOMED CT 177734793  Resolved: Neuritis / SNOMED CT 765849565  Resolved: Other  Disorders of Menstruation and Other Abnormal Bleeding from Female Genital Tract / ICD-9-.8  Resolved: Pain in limb / SNOMED CT 505575963  Resolved: Leg pain / SNOMED CT 506171851  Resolved: Patellar tendinitis / SNOMED CT 88864344  Resolved: Patellofemoral syndrome / SNOMED CT 3100547253  Resolved: Pes anserinus bursitis / SNOMED CT 815433248  Resolved: Pilonidal cyst with abscess / SNOMED CT 280549893  Resolved: Pilonidal cyst with abscess / SNOMED CT 732529884  Resolved: Plantar fasciitis / SNOMED CT 052452688  Resolved: Pregnancy  Resolved: Pregnancy / SNOMED CT 028862041  Resolved: Pregnancy / SNOMED CT 099899322  Resolved: Disturbance of skin sensation / SNOMED CT 373312367  Resolved: Tobacco Use Disorder / ICD-9-.1  Resolved: Uterine prolapse / SNOMED CT 75336793  Resolved: Uterine Prolapse without Mention of Vaginal Wall Prolapse / ICD-9-.1  Canceled: Family History of Other Cardiovascular Diseases / ICD-9-CM V17.49  Canceled: Obesity / SNOMED CT 8497823012      Histories   Past Medical History:    Active  Hyperlipidemia, unspecified (40963557): Onset on 7/18/2013 at 38 years.  Lumbosacral spondylosis without myelopathy (23483090): Onset on 1/3/2012 at 36 years.  Sleep disturbance (47764796): Onset on 6/27/2011 at 36 years.  Nonspecific reaction to tuberculin skin test without active tuberculosis (8747517070): Onset on 1/29/2010 at 35 years.  Allergic rhinitis, unspecified (345366108)  Gastrocnemius equinus (3244813593)  Lumbosacral neuritis (801254207)  Myofascial pain (309890606)  Resolved  Disorder of sacrum (91135568): Onset on 1/24/2012 at 36 years.  Resolved.  Back problem (843673171): Onset on 1/3/2012 at 36 years.  Resolved.  Myalgia (347271775): Onset on 6/27/2011 at 36 years.  Resolved.  Pain in joint involving lower leg (223774397): Onset on 1/21/2011 at 35 years.  Resolved.  Neuritis (208065151): Onset on 12/3/2010 at 35 years.  Resolved.  Comments:  12/14/2017 CST 9:40 AM CST -  Leny Cherry  Thoracic or lumbosacral  Disturbance of skin sensation (869576368): Onset on 2010 at 35 years.  Resolved.  Lumbago (138688010): Onset on 2010 at 35 years.  Resolved.  Pain in limb (245573324): Onset on 2010 at 35 years.  Resolved.  Chronic sinusitis, unspecified (95997529): Onset on 2010 at 35 years.  Resolved.  Other Disorders of Menstruation and Other Abnormal Bleeding from Female Genital Tract (626.8):  Resolved.  Uterine Prolapse without Mention of Vaginal Wall Prolapse (618.1):  Resolved.  Pregnancy:  Resolved.  Tobacco Use Disorder (305.1):  Resolved.  Patellar tendinitis (99988153):  Resolved.  Pes anserinus bursitis (683431975):  Resolved.  Pilonidal cyst with abscess (186060586):  Resolved.  Pregnancy (824545554):  Resolved in  at 28 years.  Pregnancy (435740162):  Resolved in  at 29 years.  Lipidemia (63620806):  Resolved.  Knee pain (84026995):  Resolved.  Hip pain (04122005):  Resolved on 3/7/2019 at 44 years.  Facet syndrome (447200524):  Resolved.  Comments:  2017 CST 9:50 AM Leny León  Lumbar  Lumbosacral spondylosis (581105942):  Resolved.  Leg pain (581217293):  Resolved.  Patellofemoral syndrome (8080642797):  Resolved.  Dyslipidemia (9871364101):  Resolved.  Plantar fasciitis (292436460):  Resolved.  History of tobacco use (5028018306):  Resolved.  Dysfunctional uterine bleeding (05218869):  Resolved.  Pilonidal cyst with abscess (741222078):  Resolved.  Uterine prolapse (88003111):  Resolved.  H. pylori infection (8373801261):  Resolved.   Family History:    Cardiac pacemaker  Father (Jacobo)  Comments:  2017 10:17 AM Leny León  Irregular heart beat  Diabetes mellitus  Mother (Bianka, )  Heart disease  Father (Jacobo)  Comments:  2017 10:38 AM Leny León  Bypass surgery at the age of 40.    2017 10:16 AM Leny León  Irregular heart beat  Grandfather (P)  Comments:  2017 10:35 AM NIKKIE Cherry  Leny  Heart attack at the age of 50.  Grandmother (P) ()  Comments:  2017 10:35 AM Leny León  Heart attack at the age of 30.  Mother (Bianka, )  Comments:  2017 10:16 AM Leny León  Bypass graft  High blood pressure  Father (Jacobo)  Mother (Bianka, )  CA - Cancer  Mother (Bianka, )  MS - Multiple sclerosis  Sister  Mother (Bianka, )  Aunt (M)  Colon Polyps  Mother (Bianka, )  Kidney Disease  Mother (Bianka, )  Comments:  2017 10:37 AM CST Leny Francisco  Kidney failure  Colon cancer  Grandfather (M) ()  Great Uncle (P) ()  Great Grandfather (M)     Procedure history:    Cardiac computed tomography for calcium scoring (SNOMED CT 2945322928) on 2017 at 42 Years.  Comments:  2017 1:58 PM CST - Shantel Pizano  Total Agatston calcium score is 8.  THR - Total hip replacement (SNOMED CT 347538007) in  at 39 Years.  Comments:  2017 10:58 AM CST - Onelia Espino  left  Pilonidal cyst (SNOMED CT 6061025911) on 2012 at 37 Years.  Comments:  2017 10:41 AM Leny León  Excision.  No abscess.  Nerve conduction study (SNOMED CT 50303090) on 2010 at 35 Years.  Cystoscopy (SNOMED CT 27677163) on 2009 at 34 Years.  Hysterectomy (SNOMED CT 736540591) on 2007 at 32 Years.  Comments:  2017 10:44 AM Leny León  Right salpingo-oophorectomy.   Social History:        Alcohol Assessment            1 drink every 3 months or more.      Tobacco Assessment            Former smoker, quit more than 30 days ago, Cigarettes, Started age 13 Years.  Stopped age 33 Years.      Employment and Education Assessment            Employed, Work/School description: [employer].            Employed, Work/School description: CMA.      Exercise and Physical Activity Assessment            Exercise frequency: 2-3 x per wk..      Physical Examination   Vital Signs   3/7/2019 7:56 AM CST Temperature Tympanic  97.2 DegF  LOW    Peripheral Pulse Rate 87 bpm    HR Method Electronic    Systolic Blood Pressure 122 mmHg    Diastolic Blood Pressure 85 mmHg  HI    Mean Arterial Pressure 97 mmHg    BP Method Electronic      Measurements from flowsheet : Measurements   3/7/2019 7:56 AM CST     Weight Measured - Standard                162.2 lb     General:  Alert and oriented, No acute distress.    Eye:  Normal conjunctiva.    HENT:  Tympanic membranes are clear, No pharyngeal erythema.    Neck:  Supple, Non-tender.    Respiratory:  Lungs are clear to auscultation, Respirations are non-labored.    Cardiovascular:  Normal rate, Regular rhythm.    Gastrointestinal:  Soft, Non-tender, No organomegaly.    Neurologic:  Alert, Oriented.       Health Maintenance      Recommendations     Pending (in the next year)        OverDue           Alcohol Misuse Screen (Female) due  11/22/18  and every 1  year(s)           Depression Screen (Female) due  11/22/18  and every 1  year(s)           Lipid Disorders Screen (Female) due  12/05/18  and every 1  year(s)        Due            HIV Screen (if sexually active) (Female) due  03/07/19  and every 1  year(s)           STD Counseling (if sexually active) (Female) due  03/07/19  and every 1  year(s)           Syphilis Screen (if sexually active) (Female) due  03/07/19  and every 1  year(s)           Type 2 Diabetes Mellitus Screen (Female) due  03/07/19  Variable frequency        Due In Future            Body Mass Index Check (Female) not due until  10/01/19  and every 1  year(s)           Tobacco Use Screen (Female) not due until  01/15/20  and every 1  year(s)     Satisfied (in the past 1 year)        Satisfied            Body Mass Index Check (Female) on  10/01/18.           Body Mass Index Check (Female) on  03/29/18.           High Blood Pressure Screen (Female) on  03/07/19.           High Blood Pressure Screen (Female) on  01/15/19.           High Blood Pressure Screen (Female) on   10/01/18.           High Blood Pressure Screen (Female) on  09/25/18.           High Blood Pressure Screen (Female) on  07/12/18.           High Blood Pressure Screen (Female) on  03/29/18.           Obesity Screen and Counseling (Female) on  03/07/19.           Obesity Screen and Counseling (Female) on  01/15/19.           Obesity Screen and Counseling (Female) on  10/01/18.           Obesity Screen and Counseling (Female) on  09/25/18.           Obesity Screen and Counseling (Female) on  07/12/18.           Obesity Screen and Counseling (Female) on  03/29/18.           Tobacco Use Screen (Female) on  01/15/19.           Tobacco Use Screen (Female) on  10/01/18.           Tobacco Use Screen (Female) on  07/12/18.           Tobacco Use Screen (Female) on  03/29/18.      Impression and Plan   Diagnosis     HTN (hypertension) (RKS99-SJ I10).     Family history of heart attack (TSU04-AS Z82.49).     Hyperlipidemia, unspecified (ANQ23-NR E78.5).     Abnormal loss of weight (GUT95-DX R63.4).     Abdominal pain (VGF14-FZ R10.9).     Course:  Not improving.    Plan:  We will have her stop her atorvastatin and see if it makes a difference in her symptoms  No changes in her antihypertensive treatment yet until her other symptoms are resolved  Significant weight loss anorexia and abdominal discomfort followed by GI I would like to also get an internal medicine input to her symptoms.  We will recheck labs today.       .    Patient Instructions:       Counseled: Patient, Regarding diagnosis, Regarding treatment, Regarding medications, Diet, Activity.

## 2022-02-16 NOTE — TELEPHONE ENCOUNTER
---------------------  From: Rere Case (TFS Message Pool ([facility]))   To: Appointment Pool ([address] [place] [address]);     Sent: 5/13/2020 10:40:15 AM CDT  Subject: General Message     Please call patient to schedule face to face visit with TFS.  (labs to be done same day, no need for separate lab visit)     BMP due      ---------------------  From: Lilli Swain (Recall  Pool ([address]))   To: TFS Message Pool ([facility]);     Sent: 5/11/2020 12:41:17 PM CDT ! Show up: 5/11/2020 12:41:00 PM CDT        Addendum by Lilli Swain on May 11, 2020 12:41:11 PM CDT  Forwarded to [facility] Message Pool.         ---------------------  From: Fabi Chino   To: Recall  Pool ([facility]);     Sent: 10/10/2019 10:22:19 AM CDT Show up: 4/9/2020 6:00:00 AM CDT  Subject: CHRONIC DISEASE VISIT    Due Date/Time: 4/9/2020 6:00:00 AM CDT     Return to Clinic for the following per TFS                                      Reason for visit: FOLLOW UP                                                           Due: SIX MONTHS                           Additional Comments: DX: Hypertension goal BP (blood pressure) < 130/80 (I10), Hyperlipidemia, unspecified (E78.5)patient scheduled 5/19 @ 10:20am

## 2022-02-16 NOTE — TELEPHONE ENCOUNTER
---------------------  From: Meredith Kaminski LPN (eRx Pool ([address]))   To: JRKICKZ Message Pool ([address]_[place]);     Sent: 12/29/2021 11:14:10 AM CST  Subject: FW: Medication Management   Due Date/Time: 12/29/2021 3:42:00 AM CST     Please advise on refills. Pt has received 30 day protocol and 14 day supply. Message was left for pt on 11/9 letting her know she needed to make an appt for refills. No appt has been made.        ------------------------------------------  From: [facility] #[id number]  To: Augustin Chatman MD  Sent: December 27, 2021 3:42:05 AM CST  Subject: Medication Management  Due: December 15, 2021 8:09:06 PM CST     ** On Hold Pending Signature **     Dispensed Drug: amLODIPine (amLODIPine 5 mg oral tablet), TAKE 1 TABLET BY MOUTH EVERY DAY, APPOINTMENT NEEDED  Quantity: 90 tab(s)  Days Supply: 90  Refills: 0  Substitutions Allowed  Notes from Pharmacy: **Patient requests 90 days supply**     ** On Hold Pending Signature **     Dispensed Drug: atorvastatin (atorvastatin 40 mg oral tablet), TAKE 1 TABLET BY MOUTH DAILY  Quantity: 90 tab(s)  Days Supply: 90  Refills: 0  Substitutions Allowed  Notes from Pharmacy:     ** On Hold Pending Signature **     Dispensed Drug: gabapentin (gabapentin 300 mg oral capsule), TAKE 1 CAPSULE BY MOUTH THREE TIMES DAILY  Quantity: 270 cap(s)  Days Supply: 90  Refills: 0  Substitutions Allowed  Notes from Pharmacy:  ---------------------------------------------------------------  From: Anum Gama CMA (JRKICKZ Message Pool ([address]))   To: Augustin Chatman MD;     Sent: 12/29/2021 11:50:52 AM CST  Subject: FW: Medication Management   Due Date/Time: 12/29/2021 3:42:00 AM CST  ** Submitted: **  Complete:amLODIPine (amLODIPine 5 mg oral tablet)   Signed by Augustin Chatman MD  12/29/2021 5:57:00 PM [facility]    ** Approved with modifications: **  amLODIPine (AMLODIPINE BESYLATE 5MG TABLETS)  TAKE 1 TABLET BY MOUTH EVERY DAY,  APPOINTMENT NEEDED  Qty:  90 tab(s)        Days Supply:  90        Refills:  0          Substitutions Allowed     [id number]   Note from Pharmacy:  **Patient requests 90 days supply**  ** Submitted: **  Complete:atorvastatin (atorvastatin 40 mg oral tablet)   Signed by Augustin Chatman MD  12/29/2021 5:57:00 PM [facility]    ** Approved with modifications: **  atorvastatin (ATORVASTATIN 40MG TABLETS)  TAKE 1 TABLET BY MOUTH DAILY  Qty:  90 tab(s)        Days Supply:  90        Refills:  0          Substitutions Allowed     [id number]

## 2022-02-16 NOTE — TELEPHONE ENCOUNTER
---------------------  From: Noris Wylie CMA (eRx Pool ([address]224_[facility]))   To: JANENE Merino Pool ([address]_[place]);     Sent: 11/10/2020 11:39:15 AM CST  Subject: FW: Medication Management   Due Date/Time: 11/11/2020 7:28:00 AM CST     Medication Refill needing approval    PCP:   CORNEL out of clinic    Medication:   tizanidine 10/16/20 #60, 0           gabapentin 5/19/20 #270, 1  CSA on file?   no     Date of last office visit and reason:   9/30/20; gastritis  Date of last labs pertaining to condition:  5/19/19    Return to Clinic order placed?  yes; due this month        ------------------------------------------  From: [facility] #[id number]  To: Augustin Chatman MD  Sent: November 10, 2020 7:28:01 AM CST  Subject: Medication Management  Due: November 7, 2020 12:21:53 AM CST     ** On Hold Pending Signature **     Dispensed Drug: tiZANidine (tiZANidine 4 mg oral tablet), TAKE 2 TABLETS BY MOUTH EVERY 8 HOURS AS NEEDED FOR MUSCLE PAIN  Quantity: 60 tab(s)  Days Supply: 10  Refills: 0  Substitutions Allowed  Notes from Pharmacy:     ** On Hold Pending Signature **     Dispensed Drug: gabapentin (gabapentin 300 mg oral capsule), TAKE 1 CAPSULE BY MOUTH THREE TIMES DAILY  Quantity: 270 cap(s)  Days Supply: 90  Refills: 0  Substitutions Allowed  Notes from Pharmacy:  ------------------------------------------  ** Submitted: **  Order:gabapentin (gabapentin 300 mg oral capsule)  1 cap(s)  Oral  tid  Qty:  270 cap(s)        Days Supply:  90        Refills:  0          Substitutions Allowed     Route To Pharmacy - [facility] #[id number]    Signed by Anum Gama CMA  11/10/2020 6:03:00 PM [facility]    ** Submitted: **  Complete:gabapentin (gabapentin 300 mg oral capsule)   Signed by Anum Gama CMA  11/10/2020 6:03:00 PM [facility]    ** Not Approved:  **  gabapentin (GABAPENTIN 300MG CAPSULES)  TAKE 1 CAPSULE BY MOUTH THREE TIMES DAILY  Qty:  270 cap(s)        Days Supply:  90        Refills:  0           Substitutions Allowed     Route To [id number]   Signed by Anum Gama CMA---------------------  From: Anum Gama CMA (eRx Pool (32224_[facility]))   To: [facility] [place] ([address]);     Sent: 11/10/2020 12:05:16 PM CST  Subject: FW: Medication Management   Due Date/Time: 11/11/2020 7:28:00 AM CST     Please advise on tizanidine request as TFS is OOC until Dec.     Medication Refill needing approval    PCP:   CORNEL out of clinic    Medication:   tizanidine 10/16/20 #60, 0            CSA on file?   no     Date of last office visit and reason:   9/30/20; gastritis  Date of last labs pertaining to condition:  5/19/19    Return to Clinic order placed?  yes; due this month---------------------  From: Roberto Pérez PA-C   To: [facility] #[id number]    Sent: 11/10/2020 12:20:30 PM CST  Subject: FW: Medication Management     ** Submitted: **  Complete:tiZANidine (tiZANidine 4 mg oral tablet)   Signed by Roberto Pérez PA-C  11/10/2020 6:20:00 PM [facility]    ** Submitted: **  Complete:tiZANidine (tiZANidine 4 mg oral tablet)   Signed by Roberto Pérez PA-C  11/10/2020 6:20:00 PM [facility]    ** Approved **  tiZANidine (TIZANIDINE 4MG TABLETS)  TAKE 2 TABLETS BY MOUTH EVERY 8 HOURS AS NEEDED FOR MUSCLE PAIN  Qty:  60 tab(s)        Days Supply:  10        Refills:  0          Substitutions Allowed     Route To [facility] #[id number]   Signed by Roberto Pérez PA-C

## 2022-02-16 NOTE — NURSING NOTE
Comprehensive Intake Entered On:  3/7/2019 7:57 AM CST    Performed On:  3/7/2019 7:56 AM CST by Rere Case               Summary   Chief Complaint :   Labs   Weight Measured :   162.2 lb(Converted to: 162 lb 3 oz, 73.57 kg)    Systolic Blood Pressure :   122 mmHg   Diastolic Blood Pressure :   85 mmHg (HI)    Mean Arterial Pressure :   97 mmHg   Peripheral Pulse Rate :   87 bpm   BP Method :   Electronic   HR Method :   Electronic   Temperature Tympanic :   97.2 DegF(Converted to: 36.2 DegC)  (LOW)    Rere Case - 3/7/2019 7:56 AM CST

## 2022-02-16 NOTE — TELEPHONE ENCOUNTER
Entered by Dallas Rodriges CMA on August 30, 2019 11:43:08 AM CDT  ---------------------  From: Dallas Rodriges CMA   To: [facility] #[id number]    Sent: 8/30/2019 11:43:08 AM CDT  Subject: Medication Management     ** Submitted: **  Order:atorvastatin (atorvastatin 40 mg oral tablet)  1 tab(s)  Oral  daily  Qty:  30 tab(s)        Refills:  0          Substitutions Allowed     [id number]    Signed by Dallas Rodriges CMA  8/30/2019 11:42:00 AM    ** Submitted: **  Complete:atorvastatin (atorvastatin 40 mg oral tablet)   Signed by Dallas Rodriges CMA  8/30/2019 11:43:00 AM    ** Not Approved:  **  atorvastatin (ATORVASTATIN 40MG TABLETS)  TAKE 1 TABLET BY MOUTH DAILY  Qty:  90 tab(s)        Days Supply:  90        Refills:  0          Substitutions Allowed     [id number]   Signed by Dallas Rodriges CMA            ** Submitted: **  Order:amLODIPine (amLODIPine 2.5 mg oral tablet)  1 tab(s)  Oral  daily  Qty:  30 tab(s)        Refills:  0          Substitutions Allowed     [id number]    Signed by Dallas Rodriges CMA  8/30/2019 11:42:00 AM    ** Submitted: **  Complete:amLODIPine (amLODIPine 2.5 mg oral tablet)   Signed by Dallas Rodriges CMA  8/30/2019 11:42:00 AM    ** Not Approved:  **  amLODIPine (AMLODIPINE BESYLATE 2.5MG TABLETS)  TAKE 1 TABLET BY MOUTH DAILY  Qty:  90 tab(s)        Days Supply:  90        Refills:  0          Substitutions Allowed     Route To [facility] #[id number]   Signed by Dallas Rodriges CMA            ------------------------------------------  [id number]  To: Augustin Chatman MD  Sent: August 29, 2019 10:28:14 AM CDT  Subject: Medication Management  Due: August 30, 2019 10:28:14 AM CDT    ** On Hold Pending Signature **  Drug: atorvastatin (atorvastatin 40 mg oral tablet)  1 TAB(S) ORAL DAILY  Quantity: 90 tab(s)     Days Supply: 0          Refills: 0  Substitutions Allowed  Notes from Pharmacy:     Dispensed Drug: atorvastatin (atorvastatin 40 mg oral tablet)  TAKE 1 TABLET BY MOUTH DAILY  Quantity: 90 tab(s)     Days Supply: 90        Refills: 0  Substitutions Allowed  Notes from Pharmacy:     ** On Hold Pending Signature **  Drug: amLODIPine (amLODIPine 2.5 mg oral tablet)  1 TAB(S) ORAL DAILY  Quantity: 90 tab(s)     Days Supply: 0         Refills: 0  Substitutions Allowed  Notes from Pharmacy:     Dispensed Drug: amLODIPine (amLODIPine 2.5 mg oral tablet)  TAKE 1 TABLET BY MOUTH DAILY  Quantity: 90 tab(s)     Days Supply: 90        Refills: 0  Substitutions Allowed  Notes from Pharmacy:   ------------------------------------------Med Refill      Date of last office visit and reason:  3-14-19 diarrhea      Date of last Med Check / Px:   HTN check 3-7-19  Date of last labs pertaining to med:  Lipid panel 12-5-17    Note:  Rx filled per protocol.  Dallas Rodriges CMA    RTC order in chart:  yes    For Protocol refill, has patient been contacted:  _

## 2022-02-16 NOTE — NURSING NOTE
Comprehensive Intake Entered On:  7/28/2021 1:00 PM CDT    Performed On:  7/28/2021 12:54 PM CDT by Lucian Singleton LPN               Summary   Chief Complaint :   MEDICATION CONSULT - RECENTLY DIAGNOSED WITH BRAIN TUMOR, Upcoming surgery, Pain is very bad.   Weight Measured :   123 lb(Converted to: 123 lb 0 oz, 55.792 kg)    Height Measured :   63 in(Converted to: 5 ft 3 in, 160.02 cm)    Body Mass Index :   21.79 kg/m2   Body Surface Area :   1.57 m2   Systolic Blood Pressure :   126 mmHg   Diastolic Blood Pressure :   66 mmHg   Mean Arterial Pressure :   86 mmHg   Peripheral Pulse Rate :   64 bpm   BP Site :   Right arm   Pulse Site :   Radial artery   BP Method :   Manual   HR Method :   Manual   Lucian Singleton LPN - 7/28/2021 12:54 PM CDT   Consents   Consent for Immunization Exchange :   Consent Granted   Consent for Immunizations to Providers :   Consent Granted   Lucian Singleton LPN - 7/28/2021 12:54 PM CDT   Meds / Allergies   (As Of: 7/28/2021 1:00:59 PM CDT)   Allergies (Active)   Metal  Estimated Onset Date:   Unspecified ; Created By:   Leny Cherry; Reaction Status:   Active ; Category:   Other ; Substance:   Metal ; Type:   Allergy ; Updated By:   Leny Cherry; Reviewed Date:   7/28/2021 12:54 PM CDT      No Known Medication Allergies  Estimated Onset Date:   Unspecified ; Created By:   Rere Case; Reaction Status:   Active ; Category:   Drug ; Substance:   No Known Medication Allergies ; Type:   Allergy ; Updated By:   Rere Case; Reviewed Date:   7/28/2021 12:54 PM CDT        Medication List   (As Of: 7/28/2021 1:00:59 PM CDT)   Prescription/Discharge Order    amLODIPine  :   amLODIPine ; Status:   Prescribed ; Ordered As Mnemonic:   amLODIPine 5 mg oral tablet ; Simple Display Line:   1 tab(s), Oral, daily, 30 tab(s), 0 Refill(s) ; Ordering Provider:   Augustin Chatman MD; Catalog Code:   amLODIPine ; Order Dt/Tm:   7/9/2021 10:05:03 AM CDT          atorvastatin  :   atorvastatin  ; Status:   Prescribed ; Ordered As Mnemonic:   atorvastatin 40 mg oral tablet ; Simple Display Line:   1 tab(s), Oral, daily, 30 tab(s), 0 Refill(s) ; Ordering Provider:   Augustin Chatman MD; Catalog Code:   atorvastatin ; Order Dt/Tm:   6/3/2021 6:18:10 AM CDT          celecoxib  :   celecoxib ; Status:   Prescribed ; Ordered As Mnemonic:   celecoxib 100 mg oral capsule ; Simple Display Line:   1 cap(s), Oral, bid, 60 cap(s), 0 Refill(s) ; Ordering Provider:   Augustin Chatman MD; Catalog Code:   celecoxib ; Order Dt/Tm:   7/9/2021 10:05:33 AM CDT          gabapentin  :   gabapentin ; Status:   Prescribed ; Ordered As Mnemonic:   gabapentin 300 mg oral capsule ; Simple Display Line:   1 cap(s), Oral, tid, 270 cap(s), 1 Refill(s) ; Ordering Provider:   Augustin Chatman MD; Catalog Code:   gabapentin ; Order Dt/Tm:   12/8/2020 2:57:17 PM CST          predniSONE  :   predniSONE ; Status:   Processing ; Ordered As Mnemonic:   predniSONE 50 mg oral tablet ; Ordering Provider:   Roberto Pérez PA-C; Action Display:   Complete ; Catalog Code:   predniSONE ; Order Dt/Tm:   7/28/2021 12:58:51 PM CDT          tiZANidine  :   tiZANidine ; Status:   Prescribed ; Ordered As Mnemonic:   tiZANidine 4 mg oral tablet ; Simple Display Line:   2 tab(s), Oral, q8 hrs, PRN: AS NEEDED FOR MUSCLE PAIN, 60 tab(s), 0 Refill(s) ; Ordering Provider:   Augustin Chatman MD; Catalog Code:   tiZANidine ; Order Dt/Tm:   7/13/2021 7:28:49 AM CDT            Home Meds    aspirin  :   aspirin ; Status:   Documented ; Ordered As Mnemonic:   aspirin 81 mg oral tablet ; Simple Display Line:   81 mg, 1 tab(s), PO, Daily, 30 tab(s), 0 Refill(s) ; Catalog Code:   aspirin ; Order Dt/Tm:   7/12/2018 8:41:06 AM CDT          omega-3 polyunsaturated fatty acids  :   omega-3 polyunsaturated fatty acids ; Status:   Documented ; Ordered As Mnemonic:   Fish Oil 1000 mg oral capsule ; Simple Display Line:   1,000 mg, 1 cap(s), Oral, daily, 0 Refill(s) ;  Catalog Code:   omega-3 polyunsaturated fatty acids ; Order Dt/Tm:   9/25/2018 5:25:20 PM CDT            Social History   Social History   (As Of: 7/28/2021 1:00:59 PM CDT)   Alcohol:        1 drink every 3 months or more.   (Last Updated: 11/24/2017 11:00:46 AM CST by Onelia Espino)          Tobacco:        Former smoker, quit more than 30 days ago, Stopped age About 33 Years.   (Last Updated: 12/8/2020 2:16:03 PM CST by Rere Case)          Electronic Cigarette/Vaping:        Electronic Cigarette Use: Never.   (Last Updated: 12/8/2020 2:16:08 PM CST by Rere Case)          Employment/School:        Employed, Work/School description: CMA.   (Last Updated: 11/24/2017 10:50:50 AM CST by Onelia Espino)   Employed, Work/School description: CMA.   (Last Updated: 11/24/2017 10:55:17 AM CST by Onelia Espino)          Exercise:        Exercise frequency: 2-3 x per wk..   (Last Updated: 11/24/2017 11:01:10 AM CST by Onelia Espino)

## 2022-02-16 NOTE — TELEPHONE ENCOUNTER
Entered by Dallas Rodriges CMA on March 15, 2019 10:00:07 AM CDT  I called pt and informed her [facility] would like her to make a lab appt as soon as she is able.  Pt agreeable to lab appt today and was transferred to scheduling.  RTC placed with BR lab orders.  [phone] Dallas Rodriges CMA      ---------------------  From: Jorgito Ovalle MD   To: [facility] [place] Message Pool ([address]_[place]);     Sent: 3/14/2019 11:19:34 PM CDT  Subject: General Message     Please have Kassandra return to lab for:  sed rate, CRP, comprehensive celiac panel  stool culture (shigella, salmonella, campylobacter), C. difficile toxin assay    results should be sent to [facility] when available

## 2022-02-16 NOTE — TELEPHONE ENCOUNTER
Entered by Noris Wylie CMA on July 13, 2021 12:35:38 PM CDT  ---------------------  From: Noris Wylie CMA   To: [facility] #[id number]    Sent: 7/13/2021 12:35:38 PM CDT  Subject: Medication Management     ** Not Approved: filled **  tiZANidine (TIZANIDINE 4MG TABLETS)  TAKE 2 TABLETS BY MOUTH EVERY 8 HOURS AS NEEDED FOR MUSCLE PAIN  Qty:  60 tab(s)        Days Supply:  10        Refills:  0          Substitutions Allowed     Route To Pharmacy - [facility] #[id number]   Signed by Noris Wylie CMA            ------------------------------------------  From: [facility] #[id number]  To: Augustin Chatman MD  Sent: July 11, 2021 2:33:33 PM CDT  Subject: Medication Management  Due: June 4, 2021 8:25:53 PM CDT     ** On Hold Pending Signature **     Dispensed Drug: tiZANidine (tiZANidine 4 mg oral tablet), TAKE 2 TABLETS BY MOUTH EVERY 8 HOURS AS NEEDED FOR MUSCLE PAIN  Quantity: 60 tab(s)  Days Supply: 10  Refills: 0  Substitutions Allowed  Notes from Pharmacy:  ------------------------------------------

## 2022-02-16 NOTE — TELEPHONE ENCOUNTER
---------------------  From: Jyoti/Aneta SKINNER (eRx Pool (32224_[facility]))   To: [facility] Message Pool ([address]_[place] - [place]);     Sent: 8/25/2021 11:05:09 AM CDT  Subject: FW: Medication Management   Due Date/Time: 8/25/2021 9:06:00 AM CDT     Tramadol 50mg  LF 8/3/21 #30  LV 7/28/21 ER f/u          ------------------------------------------  From: [facility] #[id number]  To: Augustin Chatman MD  Sent: August 24, 2021 9:06:17 AM CDT  Subject: Medication Management  Due: August 20, 2021 11:17:32 AM CDT     ** On Hold Pending Signature **     Dispensed Drug: traMADol (traMADol 50 mg oral tablet), TAKE 2 TABLETS BY MOUTH EVERY 8 HOURS AS NEEDED FOR HEADACHE  Quantity: 30 tab(s)  Days Supply: 5  Refills: 0  Substitutions Allowed  Notes from Pharmacy:  ---------------------------------------------------------------  From: Anum Gama CMA ([facility] Message Pool ([address]))   To: Augustin Chatman MD;     Sent: 8/25/2021 11:20:55 AM CDT  Subject: FW: Medication Management   Due Date/Time: 8/25/2021 9:06:00 AM CDT

## 2022-02-16 NOTE — TELEPHONE ENCOUNTER
---------------------  From: Jyoti/Aneta SKINNER (Phone Messages Pool ([address]))   To: [employer] Message Pool ([address] [place] [address]);     Sent: 5/28/2020 1:59:08 PM CDT  Subject: Pain      Phone Message    PCP: CORNEL    Time of Call: 1349    Phone Number: [phone]    Returned call at: 1356    Note: Patient called stating that she is wondering if [facility] would send in a pain medication for her neck pain and head aches. Patient stated she met with [facility] and they told her that before they do anything more they need her to complete 12 more weeks of Pt. Patient states her pain is very bad and would like something for pain prescribed until [facility] can figure this out. Please advise.     Pharmacy: [facility]     Last office visit and reason: 5/19/20 HTN    Transferred to: [facility]FS Message Pool---------------------  From: Ilda Araiza CMA ([employer] Message Pool ([address]))   To: Augustin Chatman MD;     Sent: 5/29/2020 8:31:12 AM CDT  Subject: FW: Pain---------------------  From: Augustin Chatman MD   To: [employer] Message Pool ([address]);     Sent: 5/29/2020 8:55:47 AM CDT  Subject: RE: Pain      celebrex 100mg bid dsp 60 3 ref  no ibuprofen while on itCalled notified prescription sent and instructed to avoid using ibuprofen while on it. Pt expressed understanding and agreed with plan of care.

## 2022-02-16 NOTE — PROGRESS NOTES
Patient:   YESSENIA RIDDLE            MRN: [id number]            FIN: [id number]               Age:   46 years     Sex:  Female     :  1975   Associated Diagnoses:   Acute viral syndrome   Author:   Augustin Chatman MD      Visit Information      Date of Service: 2021 09:24 am  Performing Location: [facility]  Encounter#: [id number]      Primary Care Provider (PCP):  Augustin Chatman MD    NPI# [id number]      Referring Provider:  Augustin Chatman MD# [id number]   Visit type:  Video Visit via [employer] or [employer].    Participants in room during visit:  _ pt   Location of patient:  _ home  Location of provider:  _ (Clinic office   Video Start Time:  _928  Video End Time:   _933    Today's visit was conducted via video conference due to the COVID-19 pandemic.  The patient's consent to proceed with a video visit has been obtained and documented.      Chief Complaint   2021 9:24 AM CST   Verbal consent given for telephone visit. Fatigue, runny nose/congestion, ST since this AM.        History of Present Illness   Patient  is being evaluated via a billable video visit. Patient calls as a cold.  She awoke this morning with a cold.  Several people at work of had colds.  She is vaccinated for Covid.  She has an upcoming surgery for her meningioma next month.         Review of Systems   Constitutional:  Negative.    Eye:  Negative.    Ear/Nose/Mouth/Throat:  Negative except as documented in history of present illness.    Respiratory:  Negative except as documented in history of present illness.    Cardiovascular:  Negative.    Gastrointestinal:  Negative.    Genitourinary:  Negative.    Immunologic:  Negative.    Musculoskeletal:  Negative.    Integumentary:  Negative.    Neurologic:  Negative.    Psychiatric:  Negative.       Health Status   Allergies:    Allergic Reactions (Selected)  Severity Not Documented  Metal (No reactions were documented)  No Known Medication  Allergies   Medications:  (Selected)   Prescriptions  Prescribed  amLODIPine 5 mg oral tablet: = 1 tab(s), Oral, daily, # 14 tab(s), 0 Refill(s), Type: Maintenance, Pharmacy: "[facility] STORE #[id number], 1 tab(s) Oral daily, 63, in, 07/28/21 12:54:00 CDT, Height Measured, 123, lb, 07/28/21 12:54:00 CDT, Weight Measured  atorvastatin 40 mg oral tablet: = 1 tab(s), Oral, daily, # 14 tab(s), 0 Refill(s), Type: Maintenance, [facility]: [facility] #[id number], 1 tab(s) Oral daily, 63, in, 07/28/21 12:54:00 CDT, Height Measured, 123, lb, 07/28/21 12:54:00 CDT, Weight Measured  celecoxib 100 mg oral capsule: = 1 cap(s), Oral, bid, # 30 cap(s), 0 Refill(s), Type: Maintenance, Pharmacy: [facility] #[id number], 1 cap(s) Oral bid, 63, in, 07/28/21 12:54:00 CDT, Height Measured, 123, lb, 07/28/21 12:54:00 CDT, Weight Measured  gabapentin 300 mg oral capsule: = 1 cap(s), Oral, tid, # 270 cap(s), 1 Refill(s), Type: Maintenance, Pharmacy: [facility] #[id number], 1 cap(s) Oral tid, 63, in, 12/08/20 14:15:00 CST, Height Measured, 133.6, lb, 09/30/20 16:06:00 CDT, Weight Measured  Documented Medications  Documented  Fish Oil 1000 mg oral capsule: = 1 cap(s) ( 1,000 mg ), Oral, daily, 0 Refill(s), Type: Maintenance   Problem list:    All Problems  Allergic rhinitis, unspecified / SNOMED CT 899190517 / Confirmed  Lumbosacral neuritis / SNOMED CT 534969542 / Confirmed  Hypertension goal BP (blood pressure) < 130/80 / SNOMED CT 4287824832 / Confirmed  Obesity / SNOMED CT 9728165830 / Probable  GERD without esophagitis / SNOMED CT 7409146843 / Confirmed  Nonspecific reaction to tuberculin skin test without active tuberculosis / SNOMED CT 4747018428 / Confirmed  Gastrocnemius equinus / SNOMED CT 5313935145 / Confirmed  Abdominal pain / SNOMED CT 58926622 / Confirmed  Family history of heart attack / SNOMED CT 552820537 / Confirmed  Myofascial pain / SNOMED CT 521239000 / Confirmed  Lumbosacral spondylosis without myelopathy /  SNOMED CT 17237534 / Confirmed  Sleep disturbance / SNOMED CT 33481075 / Confirmed  Hyperlipidemia, unspecified / SNOMED CT 38545933 / Confirmed      Histories   Past Medical History:    Active  Hyperlipidemia, unspecified (30828821): Onset on 7/18/2013 at 38 years.  Lumbosacral spondylosis without myelopathy (27900865): Onset on 1/3/2012 at 36 years.  Sleep disturbance (15214386): Onset on 6/27/2011 at 36 years.  Nonspecific reaction to tuberculin skin test without active tuberculosis (2805714399): Onset on 1/29/2010 at 35 years.  Allergic rhinitis, unspecified (562171260)  Gastrocnemius equinus (1646625751)  Lumbosacral neuritis (591993889)  Myofascial pain (893584837)  Resolved  Disorder of sacrum (74483700): Onset on 1/24/2012 at 36 years.  Resolved.  Back problem (951719313): Onset on 1/3/2012 at 36 years.  Resolved.  Myalgia (269271294): Onset on 6/27/2011 at 36 years.  Resolved.  Pain in joint involving lower leg (608187734): Onset on 1/21/2011 at 35 years.  Resolved.  Neuritis (278605617): Onset on 12/3/2010 at 35 years.  Resolved.  Comments:  12/14/2017 CST 9:40 AM CST - Leny Cherry  Thoracic or lumbosacral  Disturbance of skin sensation (482152083): Onset on 11/23/2010 at 35 years.  Resolved.  Lumbago (182475445): Onset on 9/22/2010 at 35 years.  Resolved.  Pain in limb (987412280): Onset on 9/7/2010 at 35 years.  Resolved.  Chronic sinusitis, unspecified (20319273): Onset on 2/16/2010 at 35 years.  Resolved.  Other Disorders of Menstruation and Other Abnormal Bleeding from Female Genital Tract (626.8):  Resolved.  Uterine Prolapse without Mention of Vaginal Wall Prolapse (618.1):  Resolved.  Pregnancy:  Resolved.  Tobacco Use Disorder (305.1):  Resolved.  Patellar tendinitis (40074626):  Resolved.  Pes anserinus bursitis (753944263):  Resolved.  Pilonidal cyst with abscess (275116328):  Resolved.  Pregnancy (166975732):  Resolved in 2004 at 28 years.  Pregnancy (413541329):  Resolved in 2005 at 29  years.  Lipidemia (68175382):  Resolved.  Knee pain (84170611):  Resolved.  Hip pain (78201340):  Resolved on 3/7/2019 at 44 years.  Facet syndrome (829423944):  Resolved.  Comments:  2017 CST 9:50 AM Leny León  Lumbar  Lumbosacral spondylosis (740165204):  Resolved.  Leg pain (558104773):  Resolved.  Patellofemoral syndrome (4538177273):  Resolved.  Dyslipidemia (9286069399):  Resolved.  Plantar fasciitis (051538782):  Resolved.  History of tobacco use (1899285817):  Resolved.  Dysfunctional uterine bleeding (54390706):  Resolved.  Pilonidal cyst with abscess (794718893):  Resolved.  Uterine prolapse (89223655):  Resolved.  H. pylori infection (4873811654):  Resolved.   Family History:    Cardiac pacemaker  Father (Jacobo)  Comments:  2017 10:17 AM CST Leny Francisco  Irregular heart beat  Diabetes mellitus  Mother (Bianka, )  Heart disease  Father (Jacobo)  Comments:  2017 10:38 AM Leny León  Bypass surgery at the age of 40.    2017 10:16 AM Leny León  Irregular heart beat  Grandfather (P)  Comments:  2017 10:35 AM Leny León  Heart attack at the age of 50.  Grandmother (P) ()  Comments:  2017 10:35 AM Leny León  Heart attack at the age of 30.  Mother (Bianka, )  Comments:  2017 10:16 AM CST Leny Francisco  Bypass graft  High blood pressure  Father (Jacobo)  Mother (Bianka, )  CA - Cancer  Mother (Bianka, )  MS - Multiple sclerosis  Sister  Mother (Bianka, )  Aunt (M)  Colon Polyps  Mother (Bianka, )  Kidney Disease  Mother (Bianka, )  Comments:  2017 10:37 AM Leny León  Kidney failure  Colon cancer  Grandfather (M) ()  Great Uncle (P) ()  Great Grandfather (M)     Procedure history:    Cardiac computed tomography for calcium scoring (SNOMED CT 9419885854) on 2017 at 42 Years.  Comments:  2017 1:58 PM NIKKIE - Shantel Pizano  calcium score is 8.  Colonoscopy (procedure) (SNOMED CT 952908885) on 3/9/2017 at 42 Years.  Comments:  7/26/2021 11:56 AM KRYSTIN Rodriges Dallas AGRAWAL  Outside Source Comment: Normal. Followup in 5 years due to family history of colon polyps.  THR - Total hip replacement (SNOMED CT 971703104) in 2015 at 39 Years.  Comments:  11/24/2017 10:58 AM CST - Onelia Espino  left  Pilonidal cyst (SNOMED CT 2479182604) on 7/17/2012 at 37 Years.  Comments:  12/14/2017 10:41 AM CST - Leny Cherry  Excision.  No abscess.  Nerve conduction study (SNOMED CT 82009937) on 9/2/2010 at 35 Years.  Cystoscopy (SNOMED CT 37627288) on 9/14/2009 at 34 Years.  Hysterectomy (SNOMED CT 060538970) on 6/7/2007 at 32 Years.  Comments:  12/14/2017 10:44 AM Leny León  Right salpingo-oophorectomy.   Social History:        Electronic Cigarette/Vaping Assessment            Electronic Cigarette Use: Never.      Alcohol Assessment            1 drink every 3 months or more.      Tobacco Assessment            Former smoker, quit more than 30 days ago, Stopped age About 33 Years.      Employment and Education Assessment            Employed, Work/School description: CMA.            Employed, Work/School description: CMA.      Exercise and Physical Activity Assessment            Exercise frequency: 2-3 x per wk..        Physical Examination   General:  Alert and oriented, No acute distress.    Eye:  Pupils are equal, round and reactive to light, Normal conjunctiva.    HENT:  Oral mucosa is moist.    Neck:  Supple.    Respiratory:  Respirations are non-labored.    Psychiatric:  Cooperative, Appropriate mood & affect, Normal judgment.       Impression and Plan   Diagnosis     Acute viral syndrome (CDU32-HV B34.9).     Plan:  Patient with viral syndrome will check for Covid otherwise isolate at home until the results are back.  Call if worse  .    Patient Instructions:       Counseled: Patient, Regarding diagnosis, Regarding treatment, Activity.

## 2022-02-16 NOTE — TELEPHONE ENCOUNTER
---------------------  From: Lesa Rae CMA (eRx Pool ([id number]_[facility]))   To: QWiPS Message Pool (07824_[place] [facility] [place]);     Sent: 6/12/2020 10:28:45 AM CDT  Subject: FW: Medication Management   Due Date/Time: 6/12/2020 9:04:00 AM CDT       PCP:   CORNEL    Medication:   Tizanidine  Last Filled:  5/12/2020     Quantity:  60  Refills:  0  CSA on file?   No     Date of last office visit and reason:   5/19/2020 CDM w/ TFS  Date of last labs pertaining to condition:  _    Note:  Please advise on refill    Return to Clinic order placed?  Due back Nov 2020    Resource:   [employer]  Phone:   _          ------------------------------------------  From: [facility] #[id number]  To: Roberto Pérez PA-C  Sent: June 11, 2020 9:04:33 AM CDT  Subject: Medication Management  Due: June 5, 2020 4:00:35 PM CDT     ** On Hold Pending Signature **     Dispensed Drug: tiZANidine (tiZANidine 4 mg oral tablet), TAKE 2 TABLETS BY MOUTH EVERY 8 HOURS AS NEEDED FOR MUSCLE PAIN  Quantity: 60 tab(s)  Days Supply: 10  Refills: 0  Substitutions Allowed  Notes from Pharmacy:  ---------------------------------------------------------------  From: Ruma Sloan CMA (QWiPS Message Pool ([id number]_[facility]))   To: Augustin Chatman MD;     Sent: 6/12/2020 1:56:56 PM CDT  Subject: FW: Medication Management   Due Date/Time: 6/12/2020 9:04:00 AM CDT---------------------  From: Augustin Chatman MD   To: [facility] #[id number]    Sent: 6/12/2020 1:58:32 PM CDT  Subject: FW: Medication Management     ** Submitted: **  Complete:tiZANidine (tiZANidine 4 mg oral tablet)   Signed by Augustin Chatman MD  6/12/2020 6:58:00 PM    ** Approved with modifications: **  tiZANidine  TAKE 2 TABLETS BY MOUTH EVERY 8 HOURS AS NEEDED FOR MUSCLE PAIN  Qty:  60 tab(s)        Refills:  0          Substitutions Allowed     Details:  60 tab(s), TAKE 2 TABLETS BY MOUTH EVERY 8 HOURS AS NEEDED FOR MUSCLE PAIN, Route to Pharmacy Electronically [facility] #[id number], 6/11/2020, 5/12/2020, 10, Beto SANCHEZ, Roberto KING      Route To Pharmacy - [facility] #[id number]   Signed by Augustin Chatman MD

## 2022-02-16 NOTE — NURSING NOTE
Comprehensive Intake Entered On:  2/26/2020 8:25 AM CST    Performed On:  2/26/2020 8:24 AM CST by Rere Case               Summary   Chief Complaint :   f/up   Weight Measured :   136.2 lb(Converted to: 136 lb 3 oz, 61.78 kg)    Height Measured :   63 in(Converted to: 5 ft 3 in, 160.02 cm)    Body Mass Index :   24.12 kg/m2   Body Surface Area :   1.66 m2   Systolic Blood Pressure :   111 mmHg   Diastolic Blood Pressure :   71 mmHg   Mean Arterial Pressure :   84 mmHg   Peripheral Pulse Rate :   99 bpm   BP Method :   Electronic   HR Method :   Electronic   Temperature Tympanic :   97.2 DegF(Converted to: 36.2 DegC)  (LOW)    Rere Case - 2/26/2020 8:24 AM CST   Health Status   Allergies Verified? :   Yes   Medication History Verified? :   Yes   Rere Case - 2/26/2020 8:24 AM CST

## 2022-02-16 NOTE — TELEPHONE ENCOUNTER
---------------------  From: Peg Newman CMA (eRx Pool (32224_[facility]))   To: Jorgito Ovalle MD;     Sent: 10/16/2020 11:56:18 AM CDT  Subject: FW: Medication Management   Due Date/Time: 10/16/2020 9:21:00 PM CDT        Date of last office visit and reason:  9/30/20 gastritis with TFS    Date of last Med Check / Px:  5/19/20 HTN check with TFS  Date of last labs pertaining to med:  _    Note: Please advise on refill as TFS OC until 12/1/20    RTC order in chart:  yes due Oct 2020 for chronic disease visit    For protocol refill, has pt been contacted:  _        ------------------------------------------  From: [facility] #[id number]  To: Augustin Chatman MD  Sent: October 15, 2020 9:21:59 PM CDT  Subject: Medication Management  Due: October 8, 2020 2:03:37 PM CDT     ** On Hold Pending Signature **     Dispensed Drug: tiZANidine (tiZANidine 4 mg oral tablet), TAKE 2 TABLETS BY MOUTH EVERY 8 HOURS AS NEEDED FOR MUSCLE PAIN  Quantity: 60 tab(s)  Days Supply: 10  Refills: 0  Substitutions Allowed  Notes from Pharmacy:  ------------------------------------------

## 2022-02-16 NOTE — TELEPHONE ENCOUNTER
Entered by Ruma Sloan CMA on August 18, 2020 11:13:43 AM CDT  ---------------------  From: Ruma Sloan CMA   To: [facility] #[id number]    Sent: 8/18/2020 11:13:43 AM CDT  Subject: Medication Management     ** Submitted: **  Order:tiZANidine (tiZANidine 4 mg oral tablet)  2 tab(s)  Oral  q8 hrs  Qty:  60 tab(s)        Days Supply:  10        Refills:  0          Substitutions Allowed     PRN  AS NEEDED FOR MUSCLE PAIN      Route To Pharmacy  [facility] #[id number]    Signed by Ruma Sloan CMA  8/18/2020 4:12:00 PM [facility]    ** Submitted: **  Complete:tiZANidine (tiZANidine 4 mg oral tablet)   Signed by Ruma Sloan CMA  8/18/2020 4:13:00 PM [facility]    ** Not Approved:  **  tiZANidine (TIZANIDINE 4MG TABLETS)  TAKE 2 TABLETS BY MOUTH EVERY 8 HOURS AS NEEDED FOR MUSCLE PAIN  Qty:  60 tab(s)        Days Supply:  10        Refills:  0          Substitutions Allowed     Route To [facility] #[id number]   Signed by Ruma Sloan CMA            ------------------------------------------  From: [facility] #[id number]  To: Roberto Pérez PA-C  Sent: August 17, 2020 11:14:07 PM CDT  Subject: Medication Management  Due: August 12, 2020 4:17:26 PM CDT     ** On Hold Pending Signature **     Dispensed Drug: tiZANidine (tiZANidine 4 mg oral tablet), TAKE 2 TABLETS BY MOUTH EVERY 8 HOURS AS NEEDED FOR MUSCLE PAIN  Quantity: 60 tab(s)  Days Supply: 10  Refills: 0  Substitutions Allowed  Notes from Pharmacy:  ------------------------------------------Per last fill 7/13/2020 TFS ok'd 1 more refill.

## 2022-02-16 NOTE — TELEPHONE ENCOUNTER
---------------------  From: Jyoti/Aneta SKINNER (Phone Messages Pool (69824[facility]))   To: [employer] Message Pool (17524[facility]);     Sent: 9/9/2021 8:12:00 AM CDT  Subject: General Message     Phone Message    PCP: CORNEL    Time of Call: 0751    Phone Number: [phone] ok leave message     Returned call at: n/a    Note: Patient LM stating that she recently get diagnosed w/ brain tumor. Patient states that they told her that they are looking at removing it in early January and starting treatment. Patient wondering if she can get one more refill of Tramadol. Patient states that she has pain management scheduled at [facility] at the end of the month.    Tramadol LF 8/25/21 #30 R0    Pharmacy: [facility]     Last office visit and reason: ER FU 7/28/21 w/ TFS    Transferred to: TFS pool---------------------  From: Anum Gama CMA ([employer] [employer] Message Pool (32224[facility]))   To: Augustin Chatman MD;     Sent: 9/9/2021 8:50:37 AM CDT  Subject: FW: General Message     Neurosurgery consult note printed and put in your box to review.---------------------  From: Augustin Chatman MD   To: TFS Message Pool (32224[facility]);     Sent: 9/9/2021 9:12:28 AM CDT  Subject: RE: General Message     let her know because the Dr at [facility] did not think the meningioma is causing the pain I reduced the number of pills for nowPt notified and had no questions.

## 2022-02-16 NOTE — TELEPHONE ENCOUNTER
---------------------  From: Adina Murguia LPN (Phone Messages Pool (32224_[facility]))   To: Advanced Practice Provider [facility] (32224_[facility]);     Sent: 1/21/2019 9:15:36 AM CST  Subject: Zofran        Phone Message    PCP:   TFS      Time of Call:  0846       Person Calling:  Patient   Phone number:  [phone]    Returned call at:     Note:   Patient is calling to see if she can get Rx for Zofran.  She is still having trouble eating and feeling nauseated.  She has procedure on Thursday- Hida Scan but would like Rx for Zofran to see if it helps with her nausea and allow her to eat something.  TFS OC today.  Please advise.     Last office visit and reason:  01/15/2019 abd pain---------------------  From: Beto SANCHEZ, Roberto KING (Advanced Practice Provider [facility] (32224_[facility]))   To: Phone Messages [facility] (32224_[place]);     Sent: 1/21/2019 9:43:29 AM CST  Subject: RE: Zofran      Rx for zofran sent inCalled and notified pt that medication was sent       SONIA Newman CMA

## 2022-02-16 NOTE — PROGRESS NOTES
Patient:   YESSENIA RIDDLE            MRN: [id number]            FIN: [id number]               Age:   45 years     Sex:  Female     :  1975   Associated Diagnoses:   PUD (peptic ulcer disease)   Author:   Augustin Chatman MD      Visit Information      Date of Service: 2020 03:59 pm  Performing Location: [facility]  Encounter#: [id number]      Primary Care Provider (PCP):  Augustin Chatman MD    NPI# [id number]      Referring Provider:  Augustin Chatman MD    NPI# [id number]      Chief Complaint   2020 4:06 PM CDT    Abdominal pain x3 days. Concern for ulcer        History of Present Illness   Patient is in today for concerns over her peptic ulcer.  She notes that she is got her symptoms back again.  She is got upper abdominal pain for 3 days some nausea no vomiting no fevers chills or sweats no chest pain shortness of breath no appetite food makes it worse no stool change.         Review of Systems   Constitutional:  Negative except as documented in history of present illness.    Respiratory:  Negative.    Cardiovascular:  Negative.    Gastrointestinal:  Negative except as documented in history of present illness.    Genitourinary:  Negative.    Neurologic:  Negative.       Health Status   Allergies:    Allergic Reactions (Selected)  Severity Not Documented  Metal (No reactions were documented)  No Known Medication Allergies   Medications:  (Selected)   Prescriptions  Prescribed  Protonix 40 mg oral delayed release tablet: = 1 tab(s) ( 40 mg ), Oral, bid, # 60 tab(s), 0 Refill(s), Type: Maintenance, Pharmacy: [facility] #[id number], 1 tab(s) Oral bid, 63, in, 20 16:06:00 CDT, Height Measured, 133.6, lb, 20 16:06:00 CDT, Weight Measured  amLODIPine 5 mg oral tablet: = 1 tab(s) ( 5 mg ), Oral, daily, # 90 tab(s), 1 Refill(s), Type: Maintenance, Pharmacy: [facility] #[id number], 1 tab(s) Oral daily  atorvastatin 40 mg oral tablet: = 1 tab(s), Oral, daily, # 90  tab(s), 1 Refill(s), Type: Maintenance, Pharmacy: [facility] #[id number], 1 tab(s) Oral daily  celecoxib 100 mg oral capsule: = 1 cap(s), Oral, bid, # 60 cap(s), 0 Refill(s), Type: Maintenance, Pharmacy: [facility] #[id number], TAKE 1 CAPSULE BY MOUTH TWICE DAILY, 63, in, 05/19/20 10:23:00 CDT, Height Measured, 134, lb, 05/19/20 10:23:00 CDT, Weight Measured  gabapentin 300 mg oral capsule: = 1 cap(s) ( 300 mg ), Oral, tid, # 270 cap(s), 1 Refill(s), Type: Maintenance, Pharmacy: [facility] #[id number], 1 cap(s) Oral tid  tiZANidine 4 mg oral tablet: = 2 tab(s), Oral, q8 hrs, PRN: AS NEEDED FOR MUSCLE PAIN, # 60 tab(s), 0 Refill(s), Type: Maintenance, Pharmacy: [facility] #[id number], TAKE 2 TABLETS BY MOUTH EVERY 8 HOURS AS NEEDED FOR MUSCLE PAIN, 63, in, 05/19/20 10:23:00 CDT, Height Measur...  Documented Medications  Documented  Fish Oil 1000 mg oral capsule: = 1 cap(s) ( 1,000 mg ), Oral, daily, 0 Refill(s), Type: Maintenance  aspirin 81 mg oral tablet: 1 tab(s) ( 81 mg ), PO, Daily, # 30 tab(s), 0 Refill(s), Type: Maintenance   Problem list:    All Problems (Selected)  Abdominal pain / SNOMED CT 80806804 / Confirmed  Allergic rhinitis, unspecified / SNOMED CT 226956145 / Confirmed  Gastrocnemius equinus / SNOMED CT 2325860486 / Confirmed  Sleep disturbance / SNOMED CT 27701397 / Confirmed  Family history of heart attack / SNOMED CT 270152493 / Confirmed  GERD without esophagitis / SNOMED CT 8206652716 / Confirmed  Hyperlipidemia, unspecified / SNOMED CT 49210591 / Confirmed  Hypertension goal BP (blood pressure) < 130/80 / SNOMED CT 9033448121 / Confirmed  Lumbosacral neuritis / SNOMED CT 145453531 / Confirmed  Lumbosacral spondylosis without myelopathy / SNOMED CT 83470057 / Confirmed  Myofascial pain / SNOMED CT 084703475 / Confirmed  Nonspecific reaction to tuberculin skin test without active tuberculosis / SNOMED CT 4768650396 / Confirmed  Obesity / SNOMED CT 6786237325 / Probable       Histories   Past Medical History:    Active  Hyperlipidemia, unspecified (06807844): Onset on 7/18/2013 at 38 years.  Lumbosacral spondylosis without myelopathy (23528527): Onset on 1/3/2012 at 36 years.  Sleep disturbance (28933934): Onset on 6/27/2011 at 36 years.  Nonspecific reaction to tuberculin skin test without active tuberculosis (5788685808): Onset on 1/29/2010 at 35 years.  Allergic rhinitis, unspecified (430819121)  Gastrocnemius equinus (1692527146)  Lumbosacral neuritis (764151389)  Myofascial pain (450521933)  Resolved  Disorder of sacrum (39249035): Onset on 1/24/2012 at 36 years.  Resolved.  Back problem (526270427): Onset on 1/3/2012 at 36 years.  Resolved.  Myalgia (684393389): Onset on 6/27/2011 at 36 years.  Resolved.  Pain in joint involving lower leg (779366920): Onset on 1/21/2011 at 35 years.  Resolved.  Neuritis (845169306): Onset on 12/3/2010 at 35 years.  Resolved.  Comments:  12/14/2017 CST 9:40 AM CST - Leny Cherry  Thoracic or lumbosacral  Disturbance of skin sensation (367668463): Onset on 11/23/2010 at 35 years.  Resolved.  Lumbago (656130297): Onset on 9/22/2010 at 35 years.  Resolved.  Pain in limb (020948205): Onset on 9/7/2010 at 35 years.  Resolved.  Chronic sinusitis, unspecified (38087932): Onset on 2/16/2010 at 35 years.  Resolved.  Other Disorders of Menstruation and Other Abnormal Bleeding from Female Genital Tract (626.8):  Resolved.  Uterine Prolapse without Mention of Vaginal Wall Prolapse (618.1):  Resolved.  Pregnancy:  Resolved.  Tobacco Use Disorder (305.1):  Resolved.  Patellar tendinitis (13947233):  Resolved.  Pes anserinus bursitis (223822013):  Resolved.  Pilonidal cyst with abscess (573836053):  Resolved.  Pregnancy (838384502):  Resolved in 2004 at 28 years.  Pregnancy (775036960):  Resolved in 2005 at 29 years.  Lipidemia (95036739):  Resolved.  Knee pain (14104802):  Resolved.  Hip pain (23566804):  Resolved on 3/7/2019 at 44 years.  Facet syndrome  (713232568):  Resolved.  Comments:  2017 CST 9:50 AM eLny León  Lumbar  Lumbosacral spondylosis (455402022):  Resolved.  Leg pain (297371644):  Resolved.  Patellofemoral syndrome (1493945183):  Resolved.  Dyslipidemia (9200445783):  Resolved.  Plantar fasciitis (795899681):  Resolved.  History of tobacco use (9690739026):  Resolved.  Dysfunctional uterine bleeding (53467241):  Resolved.  Pilonidal cyst with abscess (031092253):  Resolved.  Uterine prolapse (09692331):  Resolved.  H. pylori infection (6050223836):  Resolved.   Family History:    Cardiac pacemaker  Father (Jacobo)  Comments:  2017 10:17 AM Leny León  Irregular heart beat  Diabetes mellitus  Mother (Bianka, )  Heart disease  Father (Jacobo)  Comments:  2017 10:38 AM Leny León  Bypass surgery at the age of 40.    2017 10:16 AM Leny León  Irregular heart beat  Grandfather (P)  Comments:  2017 10:35 AM Leny León  Heart attack at the age of 50.  Grandmother (P) ()  Comments:  2017 10:35 AM Leny León  Heart attack at the age of 30.  Mother (Bianka, )  Comments:  2017 10:16 AM Leny León  Bypass graft  High blood pressure  Father (Jacobo)  Mother (Bianka, )  CA - Cancer  Mother (Bianka, )  MS - Multiple sclerosis  Sister  Mother (Bianka, )  Aunt (M)  Colon Polyps  Mother (Bianka, )  Kidney Disease  Mother (Bianka, )  Comments:  2017 10:37 AM Leny León  Kidney failure  Colon cancer  Grandfather (M) ()  Great Uncle (P) ()  Great Grandfather (M)     Procedure history:    Cardiac computed tomography for calcium scoring (SNOMED CT 3028634442) on 2017 at 42 Years.  Comments:  2017 1:58 PM CST - Shantel Pizano  Total Agatston calcium score is 8.  THR - Total hip replacement (SNOMED CT 164618935) in 2015 at 39 Years.  Comments:  2017 10:58 AM NIKKIE - Srinivas  Onelia  left  Pilonidal cyst (SNOMED CT 7761692560) on 7/17/2012 at 37 Years.  Comments:  12/14/2017 10:41 AM NIKKIE Lomax Cherry  Leny  Excision.  No abscess.  Nerve conduction study (SNOMED CT 78950447) on 9/2/2010 at 35 Years.  Cystoscopy (SNOMED CT 85006287) on 9/14/2009 at 34 Years.  Hysterectomy (SNOMED CT 502151158) on 6/7/2007 at 32 Years.  Comments:  12/14/2017 10:44 AM Leny León  Right salpingo-oophorectomy.   Social History:        Alcohol Assessment            1 drink every 3 months or more.      Tobacco Assessment            Former smoker, quit more than 30 days ago, Cigarettes, Started age 13 Years.  Stopped age 33 Years.      Employment and Education Assessment            Employed, Work/School description: CMA.            Employed, Work/School description: CMA.      Exercise and Physical Activity Assessment            Exercise frequency: 2-3 x per wk..        Physical Examination   Measurements from flowsheet : Measurements   9/30/2020 4:06 PM CDT Height Measured - Standard 63 in    Weight Measured - Standard 133.6 lb    BSA 1.64 m2    Body Mass Index 23.66 kg/m2      General:  Alert and oriented, No acute distress.    Neck:  Supple, Non-tender.    Respiratory:  Lungs are clear to auscultation, Respirations are non-labored.    Cardiovascular:  Normal rate, Regular rhythm.    Gastrointestinal:  Soft, Non-distended, Normal bowel sounds, No organomegaly.         Abdomen: Right, Epigastric, Tenderness, No guarding.    Neurologic:  Alert, Oriented.       Impression and Plan   Diagnosis     PUD (peptic ulcer disease) (UWJ99-VQ K27.9).     Course:  Not progressing as expected.    Plan:  Patient with recurrence of her peptic ulcer disease we will put her on high-dose Protonix for the next month she is to stay off of Celebrex and aspirin for the time being until this all settles down and slowly reintroduce.  fu 1 week if not better sooner if worse.    Patient Instructions:       Counseled: Patient, Regarding  diagnosis, Regarding treatment, Regarding medications.

## 2022-02-16 NOTE — TELEPHONE ENCOUNTER
---------------------  From: Noris Wylie CMA (eRx Pool (32224_[facility]))   To: [facility] Message Pool (32224_[place]);     Sent: 8/11/2021 2:04:23 PM CDT  Subject: FW: Medication Management   Due Date/Time: 8/11/2021 8:10:00 AM CDT     Celebrex last filled 7/9/21 #60  Tizanidine last filled 7/13/21 #60    Last visit 7/28/21 ER f/u        ** Submitted: **  Order:amLODIPine (amLODIPine 5 mg oral tablet)  1 tab(s)  Oral  daily  Qty:  30 tab(s)        Days Supply:  30        Refills:  0          Substitutions Allowed     Route To Pharmacy - [facility] #[id number]    Signed by Noris Wylie CMA  8/11/2021 7:02:00 PM UTC    ** Submitted: **  Complete:US Lower Extremity Venous Doppler (Request)  Details:           Signed by Noris Wylie CMA  8/11/2021 7:02:00 PM UT    ** Submitted: **  Complete:amLODIPine (amLODIPine 5 mg oral tablet)   Signed by Noris Wylie CMA  8/11/2021 7:03:00 PM UT    ** Not Approved:  **  amLODIPine (AMLODIPINE BESYLATE 5MG TABLETS)  TAKE 1 TABLET BY MOUTH DAILY  Qty:  30 tab(s)        Days Supply:  30        Refills:  0          Substitutions Allowed     Route To [facility] #[id number]   Signed by Noris Wylie CMA            ------------------------------------------  From: [facility] #[id number]  To: Augustin Chatman MD  Sent: August 10, 2021 8:10:07 AM CDT  Subject: Medication Management  Due: August 10, 2021 11:05:58 PM CDT     ** On Hold Pending Signature **     Dispensed Drug: celecoxib (celecoxib 100 mg oral capsule), TAKE 1 CAPSULE BY MOUTH TWICE DAILY  Quantity: 60 cap(s)  Days Supply: 30  Refills: 0  Substitutions Allowed  Notes from Pharmacy:     ** On Hold Pending Signature **     Dispensed Drug: amLODIPine (amLODIPine 5 mg oral tablet), TAKE 1 TABLET BY MOUTH DAILY  Quantity: 30 tab(s)  Days Supply: 30  Refills: 0  Substitutions Allowed  Notes from Pharmacy:     ** On Hold Pending Signature **     Dispensed Drug: tiZANidine (tiZANidine 4 mg oral  tablet), TAKE 2 TABLETS BY MOUTH EVERY 8 HOURS AS NEEDED FOR MUSCLE PAIN  Quantity: 60 tab(s)  Days Supply: 10  Refills: 0  Substitutions Allowed  Notes from Pharmacy:  ---------------------------------------------------------------  From: Ilda Araiza CMA ([facility] Message Pool (32224_[facility]))   To: Augustin Chatman MD;     Sent: 8/11/2021 2:12:19 PM CDT  Subject: FW: Medication Management   Due Date/Time: 8/11/2021 8:10:00 AM CDT  ** Submitted: **  Complete:celecoxib (celecoxib 100 mg oral capsule)   Signed by Augustin Chatman MD  8/11/2021 7:24:00 PM [facility]    ** Approved with modifications: **  celecoxib (CELECOXIB 100MG CAPSULES)  TAKE 1 CAPSULE BY MOUTH TWICE DAILY  Qty:  60 cap(s)        Days Supply:  30        Refills:  0          Substitutions Allowed     Route To [facility] #[id number]---------------------  From: Augustin Chatman MD   To: [facility] #[id number]    Sent: 8/11/2021 2:24:56 PM CDT  Subject: FW: Medication Management     ** Submitted: **  Complete:tiZANidine (tiZANidine 4 mg oral tablet)   Signed by Augustin Chatman MD  8/11/2021 7:24:00 PM [facility]    ** Approved with modifications: **  tiZANidine (TIZANIDINE 4MG TABLETS)  TAKE 2 TABLETS BY MOUTH EVERY 8 HOURS AS NEEDED FOR MUSCLE PAIN  Qty:  60 tab(s)        Days Supply:  10        Refills:  0          Substitutions Allowed     Route To [facility] #[id number]

## 2022-02-16 NOTE — NURSING NOTE
Quick Intake Entered On:  2/2/2020 11:00 AM CST    Performed On:  2/2/2020 10:53 AM CST by Indira Jean Baptiste LPN   Chief Complaint :   cough and congestion for the past few weeks, sore throat, headache, chills, Kids had the flu within the past month. Low grade fever.     Weight Measured :   137 lb(Converted to: 137 lb 0 oz, 62.14 kg)    Height Measured :   63 in(Converted to: 5 ft 3 in, 160.02 cm)    Body Mass Index :   24.27 kg/m2   Body Surface Area :   1.66 m2   Systolic Blood Pressure :   142 mmHg (HI)    Diastolic Blood Pressure :   78 mmHg   Mean Arterial Pressure :   99 mmHg   Peripheral Pulse Rate :   88 bpm   BP Site :   Right arm   Pulse Site :   Radial artery   BP Method :   Manual   HR Method :   Manual   Temperature Tympanic :   99.6 DegF(Converted to: 37.6 DegC)    Respiratory Rate :   20 br/min   Oxygen Saturation :   99 %   Indira Jean Baptiste LPN - 2/2/2020 10:53 AM CST   Health Status   Allergies Verified? :   Yes   Medication History Verified? :   Yes   Indira Jean Baptiste LPN - 2/2/2020 10:53 AM CST   Consents   Consent for Immunization Exchange :   Consent Granted   Consent for Immunizations to Providers :   Consent Granted   Indira Jean Baptiste LPN - 2/2/2020 10:53 AM CST   Meds / Allergies   (As Of: 2/2/2020 11:00:12 AM CST)   Allergies (Active)   Metal  Estimated Onset Date:   Unspecified ; Created By:   Leny Cherry; Reaction Status:   Active ; Category:   Other ; Substance:   Metal ; Type:   Allergy ; Updated By:   Leny Cherry; Reviewed Date:   2/2/2020 10:58 AM CST      No Known Medication Allergies  Estimated Onset Date:   Unspecified ; Created By:   Rere Case; Reaction Status:   Active ; Category:   Drug ; Substance:   No Known Medication Allergies ; Type:   Allergy ; Updated By:   Rere Case; Reviewed Date:   2/2/2020 10:58 AM CST        Medication List   (As Of: 2/2/2020 11:00:12 AM CST)   Prescription/Discharge Order    cyclobenzaprine  :    cyclobenzaprine ; Status:   Processing ; Ordered As Mnemonic:   cyclobenzaprine 10 mg oral tablet ; Ordering Provider:   Roberto Pérez PA-C; Action Display:   Complete ; Catalog Code:   cyclobenzaprine ; Order Dt/Tm:   2/2/2020 10:53:52 AM CST          methylPREDNISolone  :   methylPREDNISolone ; Status:   Processing ; Ordered As Mnemonic:   Medrol Dosepak 4 mg oral tablet ; Ordering Provider:   Roberto Pérez PA-C; Action Display:   Complete ; Catalog Code:   methylPREDNISolone ; Order Dt/Tm:   2/2/2020 10:53:52 AM CST          traMADol  :   traMADol ; Status:   Processing ; Ordered As Mnemonic:   traMADol 50 mg oral tablet ; Ordering Provider:   Roberto Pérez PA-C; Action Display:   Complete ; Catalog Code:   traMADol ; Order Dt/Tm:   2/2/2020 10:53:57 AM CST          atorvastatin  :   atorvastatin ; Status:   Prescribed ; Ordered As Mnemonic:   atorvastatin 40 mg oral tablet ; Simple Display Line:   1 tab(s), Oral, daily, 90 tab(s), 1 Refill(s) ; Ordering Provider:   Augustin Chatman MD; Catalog Code:   atorvastatin ; Order Dt/Tm:   10/8/2019 5:03:22 PM CDT          amLODIPine  :   amLODIPine ; Status:   Prescribed ; Ordered As Mnemonic:   amLODIPine 5 mg oral tablet ; Simple Display Line:   5 mg, 1 tab(s), Oral, daily, 90 tab(s), 1 Refill(s) ; Ordering Provider:   Augustin Chatman MD; Catalog Code:   amLODIPine ; Order Dt/Tm:   10/8/2019 5:03:21 PM CDT            Home Meds    omega-3 polyunsaturated fatty acids  :   omega-3 polyunsaturated fatty acids ; Status:   Documented ; Ordered As Mnemonic:   Fish Oil 1000 mg oral capsule ; Simple Display Line:   1,000 mg, 1 cap(s), Oral, daily, 0 Refill(s) ; Catalog Code:   omega-3 polyunsaturated fatty acids ; Order Dt/Tm:   9/25/2018 5:25:20 PM CDT          aspirin  :   aspirin ; Status:   Documented ; Ordered As Mnemonic:   aspirin 81 mg oral tablet ; Simple Display Line:   81 mg, 1 tab(s), PO, Daily, 30 tab(s), 0 Refill(s) ; Catalog Code:   aspirin ; Order  Dt/Tm:   7/12/2018 8:41:06 AM CDT

## 2022-02-16 NOTE — TELEPHONE ENCOUNTER
---------------------  From: Augustin Chatman MD   To: TFS Message Pool ([id number]_[place]);     Sent: 9/10/2021 10:25:11 AM CDT  Subject: General Message     let her know I think this meds is causing alot of her recent symptoms  try to take it less often and see meLM with info.

## 2022-02-16 NOTE — LETTER
(Inserted Image. Unable to display)   March 18, 2019      YESSENIA RIDDLE  [address]  [place], [place] [id number]        Dear YESSENIA,     Thank you for selecting [facility] ([facility]viously [facility]) for your healthcare needs. Below you will find the results of your recent test(s) done at our clinic.      Celiac testing is normal.  Surprisingly, your inflammatory markers are normal - I would expect these to be elevated with your weight loss and bloody diarrhea complaints.  Await stool studies.  I think these results will help give the GI doctors the work-up they'd want to see.      Result Name Current Result Previous Result Reference Range   C-Reactive Protein (CRP) (mg/L)  1.1 3/15/2019   - <8.0   Immunoglob IgA (mg/dL)  220 3/15/2019  81 - 463   Sed Rate  6 3/15/2019  6 9/25/2018  - < OR = 20   Celiac Disease Interp  See comment 3/15/2019     Tissue Transglutaminase IgA (unit/mL)  1 3/15/2019         Please contact me or my assistant at [phone] if you have any questions or concerns.     Sincerely,        Jorgito Ovalle MD    What do your labs mean?  Below is a glossary of commonly ordered labs:  LDL - Bad Cholesterol  HDL - Good Cholesterol  AST/ALT - Liver Function  Cr/Creatinine - Kidney Function  Microalbumin - Kidney Function  BUN - Kidney Function  PSA - Prostate   TSH - Thyroid Hormone  HgbA1c - Diabetes Test  Hgb (Hemoglobin) - Red Blood Cells

## 2022-02-16 NOTE — LETTER
(Inserted Image. Unable to display)     September 09, 2019      YESSENIA RIDDLE  [address]  [place], [place] [id number]          Dear YESSENIA,      Thank you for selecting [facility] ([facility]viously [facility] for your healthcare needs.    Our records indicate you are due for the following services:    Hypertension check ~ please remember to bring your at-home blood pressure readings with you to your appointment.     Fasting Lab Tests ~ Please do not eat or drink anything 10 hours prior to your scheduled appointment time.  (Water and any medications that you may need are allowed unless directed otherwise.)    If you had your labs done at another facility or with Direct Access Lab Testing at [facility], please bring in a copy of the results to your next visit, mail a copy, or drop off a copy of your results to your Healthcare Provider.    You are due for lab work and an office visit; please schedule the lab appointment 1 week before the office visit.  This will assure all results are available to discuss with your provider during your visit.    **It is very helpful if you bring your medication bottles to your appointment.  This assures we have all of your current medications, including strength and dosing information, documented accurately in your medical record.    To schedule an appointment or if you have further questions, please contact your primary clinic:   [facility]       [phone]   [facility]       [phone]              [facility]     [phone]      Powered by [employer] and [employer]    Sincerely,    Augustin Chatman MD

## 2022-02-16 NOTE — NURSING NOTE
Comprehensive Intake Entered On:  7/26/2021 12:05 PM CDT    Performed On:  7/26/2021 11:57 AM CDT by Dallas Rodriges CMA               Summary   Chief Complaint :   Pt here for left neck.left shoulder,left ear and head pain, with some edema on left temple on and off x 3 months.   Weight Measured :   123 lb(Converted to: 123 lb 0 oz, 55.792 kg)    Height Measured :   63 in(Converted to: 5 ft 3 in, 160.02 cm)    Body Mass Index :   21.79 kg/m2   Body Surface Area :   1.57 m2   Systolic Blood Pressure :   114 mmHg   Diastolic Blood Pressure :   72 mmHg   Mean Arterial Pressure :   86 mmHg   Peripheral Pulse Rate :   72 bpm   BP Site :   Right arm   Pulse Site :   Radial artery   Temperature Tympanic :   97 DegF(Converted to: 36.1 DegC)  (LOW)    Respiratory Rate :   16 br/min   Oxygen Saturation :   99 %   Dallas Rodriges CMA - 7/26/2021 11:57 AM CDT   Health Status   Allergies Verified? :   Yes   Medication History Verified? :   Yes   Medical History Verified? :   Yes   Pre-Visit Planning Status :   Not completed   Tobacco Use? :   Former smoker   Dallas Rodriges CMA - 7/26/2021 11:57 AM CDT   Meds / Allergies   (As Of: 7/26/2021 12:05:20 PM CDT)   Allergies (Active)   Metal  Estimated Onset Date:   Unspecified ; Created By:   Leny Cherry; Reaction Status:   Active ; Category:   Other ; Substance:   Metal ; Type:   Allergy ; Updated By:   Leny Cherry; Reviewed Date:   2/24/2021 2:14 PM CST      No Known Medication Allergies  Estimated Onset Date:   Unspecified ; Created By:   Rere Case; Reaction Status:   Active ; Category:   Drug ; Substance:   No Known Medication Allergies ; Type:   Allergy ; Updated By:   Rere Case; Reviewed Date:   2/24/2021 2:14 PM CST        Medication List   (As Of: 7/26/2021 12:05:20 PM CDT)   Prescription/Discharge Order    tiZANidine  :   tiZANidine ; Status:   Prescribed ; Ordered As Mnemonic:   tiZANidine 4 mg oral tablet ; Simple Display Line:   2 tab(s), Oral, q8 hrs, PRN:  AS NEEDED FOR MUSCLE PAIN, 60 tab(s), 0 Refill(s) ; Ordering Provider:   Augustin Chatman MD; Catalog Code:   tiZANidine ; Order Dt/Tm:   7/13/2021 7:28:49 AM CDT          amLODIPine  :   amLODIPine ; Status:   Prescribed ; Ordered As Mnemonic:   amLODIPine 5 mg oral tablet ; Simple Display Line:   1 tab(s), Oral, daily, 30 tab(s), 0 Refill(s) ; Ordering Provider:   Augustin Chatman MD; Catalog Code:   amLODIPine ; Order Dt/Tm:   7/9/2021 10:05:03 AM CDT          celecoxib  :   celecoxib ; Status:   Prescribed ; Ordered As Mnemonic:   celecoxib 100 mg oral capsule ; Simple Display Line:   1 cap(s), Oral, bid, 60 cap(s), 0 Refill(s) ; Ordering Provider:   Augustin Chatman MD; Catalog Code:   celecoxib ; Order Dt/Tm:   7/9/2021 10:05:33 AM CDT          atorvastatin  :   atorvastatin ; Status:   Prescribed ; Ordered As Mnemonic:   atorvastatin 40 mg oral tablet ; Simple Display Line:   1 tab(s), Oral, daily, 30 tab(s), 0 Refill(s) ; Ordering Provider:   Augustin Chatman MD; Catalog Code:   atorvastatin ; Order Dt/Tm:   6/3/2021 6:18:10 AM CDT          gabapentin  :   gabapentin ; Status:   Prescribed ; Ordered As Mnemonic:   gabapentin 300 mg oral capsule ; Simple Display Line:   1 cap(s), Oral, tid, 270 cap(s), 1 Refill(s) ; Ordering Provider:   Augustin Chatman MD; Catalog Code:   gabapentin ; Order Dt/Tm:   12/8/2020 2:57:17 PM [facility]            Home Meds    omega-3 polyunsaturated fatty acids  :   omega-3 polyunsaturated fatty acids ; Status:   Documented ; Ordered As Mnemonic:   Fish Oil 1000 mg oral capsule ; Simple Display Line:   1,000 mg, 1 cap(s), Oral, daily, 0 Refill(s) ; Catalog Code:   omega-3 polyunsaturated fatty acids ; Order Dt/Tm:   9/25/2018 5:25:20 PM CDT          aspirin  :   aspirin ; Status:   Documented ; Ordered As Mnemonic:   aspirin 81 mg oral tablet ; Simple Display Line:   81 mg, 1 tab(s), PO, Daily, 30 tab(s), 0 Refill(s) ; Catalog Code:   aspirin ; Order Dt/Tm:   7/12/2018 8:41:06  AM CDT            Social History   Social History   (As Of: 7/26/2021 12:05:20 PM CDT)   Alcohol:        1 drink every 3 months or more.   (Last Updated: 11/24/2017 11:00:46 AM CST by Onelia Espino)          Tobacco:        Former smoker, quit more than 30 days ago, Stopped age About 33 Years.   (Last Updated: 12/8/2020 2:16:03 PM CST by Rere Case)          Electronic Cigarette/Vaping:        Electronic Cigarette Use: Never.   (Last Updated: 12/8/2020 2:16:08 PM CST by Rere Case)          Employment/School:        Employed, Work/School description: CMA.   (Last Updated: 11/24/2017 10:50:50 AM CST by Onelia Espino)   Employed, Work/School description: CMA.   (Last Updated: 11/24/2017 10:55:17 AM CST by Onelia Espino)          Exercise:        Exercise frequency: 2-3 x per wk..   (Last Updated: 11/24/2017 11:01:10 AM CST by Onelia Espino)

## 2022-02-16 NOTE — TELEPHONE ENCOUNTER
---------------------  From: Lucian Singleton LPN (Onzo Message Pool ([id number]_[facility]))   To: Augustin Chatman MD;     Sent: 7/28/2021 4:58:45 PM CDT  Subject: FW: General Message           ---------------------  From: Massiel Harley   To: Onzo Message Pool ([id number]);     Sent: 7/28/2021 2:00:14 PM CDT  Subject: General Message     I have been trying to reach patient regarding stat referral Roberto Pérez requested on 7-26, now I see patient was seen today and note shows she has a surgery scheduled, please confirm if the general surgery referral is no longer needed. Thank you---------------------  From: Augustin Chatman MD   To: Onzo Message Pool ([id number]);     Sent: 7/28/2021 5:14:56 PM CDT  Subject: RE: General Message     not needed---------------------  From: Rere Case (Onzo Message Pool ([facility]))   To: Referral Coordinators Pool ([address] [place] [address]);     Sent: 7/29/2021 7:24:01 AM CDT  Subject: FW: General MessageNoted.

## 2022-02-16 NOTE — TELEPHONE ENCOUNTER
---------------------  From: Anum Gama CMA (eRx Pool ([id number]_[facility]))   To: CORNEL Message Pool ([id number]_[place]);     Sent: 5/12/2020 9:16:59 AM CDT  Subject: FW: Medication Management   Due Date/Time: 5/11/2020 9:50:00 PM CDT     Medication Refill needing approval    PCP:   CORNEL    Medication:   tizanidine 4mg  Last Filled:  3/25/20    Quantity:  60  Refills:  1  CSA on file?   n/a     Date of last office visit and reason:   3/25/20 neck pain DWG    Note:  Spoke to pt to get update. She states there has been no change in sx. Rates pain from 7 to 12. Feels the tizanidine helps some and would like to keep taking. Also still taking gabapentin TID. Would like to proceed with spine center referral which was placed today. Pt would like call back if refill was approved or not.     Return to Clinic order placed?  Due this month for chronic f/u          ------------------------------------------  From: [facility] #[id number]  To: Beto SANCHEZ, Roberto KING  Sent: May 10, 2020 9:50:35 PM CDT  Subject: Medication Management  Due: May 11, 2020 9:50:35 PM CDT    ** On Hold Pending Signature **  Drug: tiZANidine (tiZANidine 4 mg oral tablet)  TAKE 2 TABLETS BY MOUTH EVERY 8 HOURS AS NEEDED FOR MUSCLE PAIN  Quantity: 60 tab(s)  Days Supply: 10  Refills: 0  Substitutions Allowed  Notes from Pharmacy:     Dispensed Drug: tiZANidine (tiZANidine 4 mg oral tablet)  TAKE 2 TABLETS BY MOUTH EVERY 8 HOURS AS NEEDED FOR MUSCLE PAIN  Quantity: 60 tab(s)  Days Supply: 10  Refills: 0  Substitutions Allowed  Notes from Pharmacy:   ---------------------------------------------------------------  From: Ana Luisa Watson (copygram Message Pool ([id number]_[facility]))   To: Augustin Chatman MD;     Sent: 5/12/2020 9:42:23 AM CDT  Subject: FW: Medication Management   Due Date/Time: 5/11/2020 9:50:00 PM CDT---------------------  From: Augustin Chatman MD   To: [facility] #[id number]    Sent: 5/12/2020 9:52:16 AM  CDT  Subject: FW: Medication Management     ** Submitted: **  Complete:tiZANidine (tiZANidine 4 mg oral tablet)   Signed by Augustin Chatman MD  5/12/2020 2:52:00 PM    ** Approved **  tiZANidine (TIZANIDINE 4MG TABLETS)  TAKE 2 TABLETS BY MOUTH EVERY 8 HOURS AS NEEDED FOR MUSCLE PAIN  Qty:  60 tab(s)        Days Supply:  10        Refills:  0          Substitutions Allowed     Route To Pharmacy - [facility] #[id number]   Signed by Augustin Chatman MD

## 2022-02-16 NOTE — RESULTS
(Inserted Image. Unable to display)          (Inserted Image. Unable to display)     [address], [place] [address]  Phone [phone]  Fax [phone]      ELECTROCARDIOGRAM REPORT    YESSENIA RIDDLE : 1975  DATE OF EXAM:  2020 MRN: [id number]   Ordering HCP: Stephanie Langston MD       Indication: Chest pain.      Findings: Sinus rhythm.  No acute abnormality.      Impression: Normal electrocardiogram.       Stephanie Langston MD   Interpreting HCP    KRISITN/mikayla  D:  2020  T:  2020    ELECTROCARDIOGRAM REPORT

## 2022-02-16 NOTE — NURSING NOTE
Comprehensive Intake Entered On:  9/30/2020 4:08 PM CDT    Performed On:  9/30/2020 4:06 PM CDT by Rere Case               Summary   Chief Complaint :   Abdominal pain x3 days. Concern for ulcer   Weight Measured :   133.6 lb(Converted to: 133 lb 10 oz, 60.600 kg)    Height Measured :   63 in(Converted to: 5 ft 3 in, 160.02 cm)    Body Mass Index :   23.66 kg/m2   Body Surface Area :   1.64 m2   Systolic Blood Pressure :   131 mmHg (HI)    Diastolic Blood Pressure :   87 mmHg (HI)    Mean Arterial Pressure :   102 mmHg   Peripheral Pulse Rate :   71 bpm   BP Method :   Electronic   HR Method :   Electronic   Temperature Tympanic :   97.9 DegF(Converted to: 36.6 DegC)    Rere Case - 9/30/2020 4:06 PM CDT   Health Status   Allergies Verified? :   Yes   Medication History Verified? :   Yes   Tobacco Use? :   Former smoker   Rere Case - 9/30/2020 4:06 PM CDT   ID Risk Screen   Recent Travel History :   No recent travel   Family Member Travel History :   No recent travel   Other Exposure to Infectious Disease :   Unknown   Rere Case - 9/30/2020 4:06 PM CDT

## 2022-02-16 NOTE — TELEPHONE ENCOUNTER
---------------------  From: Nora Willams LPN   To: [facility] Message Pool ([id number]_[place] Pool);     Sent: 2/28/2019 8:47:54 AM CST  Subject: pin worm     PCP:   Zackary UNGER     Time of Call:  _ 0818       Person Calling:  _ Pt  Phone number:  _ [phone] okay to LM      Note:   _ pt LM stating that she is still having the same stomach problems. She was referred to GI but can not get in till the end of march. Pt had a BM this morning and thinks that she has pin worms and would like to be treated for pin worms    please advise    Last office visit and reason:  _ 10----------------------  From: Rere Case ([place] Message Pool ([id number]_[facility]))   To: Augustin Chatman MD;     Sent: 2/28/2019 9:08:43 AM CST  Subject: FW: pin worm---------------------  From: Augustin Chatman MD   To: [facility] Message Pool ([id number]_[place] Pool);     Sent: 2/28/2019 9:18:34 AM CST  Subject: RE: pin worm     albendazole 400mg now rpt in 2 weeksrx sent.  patient line busy.  pharmacy will notify patient when rx is ready.pt asked if she could  kit for pin worm testing. called back at 1550 and explained test and told her she could  the kit.

## 2022-02-16 NOTE — TELEPHONE ENCOUNTER
Entered by Rere Case on March 19, 2020 7:48:47 AM CDT  ---------------------  From: Rere Case   To: [facility] #[id number]    Sent: 3/19/2020 7:48:46 AM CDT  Subject: Medication Management     ** Submitted: **  Order:atorvastatin (atorvastatin 40 mg oral tablet)  1 tab(s)  Oral  daily  Qty:  90 tab(s)        Days Supply:  90        Refills:  0          Substitutions Allowed     Route To [facility] #[id number]    Signed by Rere Case  3/19/2020 12:48:00 PM    ** Submitted: **  Complete:atorvastatin (atorvastatin 40 mg oral tablet)   Signed by Rere Case  3/19/2020 12:48:00 PM    ** Not Approved:  **  atorvastatin (ATORVASTATIN 40MG TABLETS)  TAKE 1 TABLET BY MOUTH DAILY  Qty:  90 tab(s)        Days Supply:  90        Refills:  0          Substitutions Allowed     Route To [facility] #[id number]   Signed by Rere Case          Patient seen recently for chest pain.  She is near due for her 6 month check up.  Will fill for 3 months due to covid-19 pandemic and avoiding clinic visits.       ------------------------------------------  From: [facility] #[id number]  To: Augustin Chatman MD  Sent: March 19, 2020 3:41:26 AM CDT  Subject: Medication Management  Due: March 20, 2020 3:41:26 AM CDT    ** On Hold Pending Signature **  Drug: atorvastatin (atorvastatin 40 mg oral tablet)  TAKE 1 TABLET BY MOUTH DAILY  Quantity: 90 tab(s)  Days Supply: 90  Refills: 0  Substitutions Allowed  Notes from Pharmacy:     Dispensed Drug: atorvastatin (atorvastatin 40 mg oral tablet)  TAKE 1 TABLET BY MOUTH DAILY  Quantity: 90 tab(s)  Days Supply: 90  Refills: 0  Substitutions Allowed  Notes from Pharmacy:   ------------------------------------------

## 2022-02-16 NOTE — PROGRESS NOTES
"   Patient:   YESSENIA RIDDLE            MRN: [id number]            FIN: [id number]               Age:   43 years     Sex:  Female     :  1975   Associated Diagnoses:   Acute recurrent sinusitis   Author:   Roberto Pérez PA-C      Chief Complaint   Sinus infection.      History of Present Illness   Headache and head pressure for 3 days. history of \"sinus issues\" and allergies. Patient states that she has allergies year round but the past 3 days have been worse than usual. Has tried mariaa pot 2 times today with no  relief. Also on Allergy pill (Xyzal) and flonase which have not helped either.     she has hx of chronic sinusitis,  her last sinus infection was about 2 months ago (she went thru [facility], was prescribed amoxicillin)      Review of Systems   Constitutional:  No fever, No chills, No sweats, No fatigue.    Ear/Nose/Mouth/Throat:  No nasal congestion, No sore throat.    Respiratory:  No shortness of breath, No cough, No sputum production, No hemoptysis, No wheezing.    Cardiovascular:  Chest pain.    Gastrointestinal:  No nausea, No vomiting, No diarrhea.    Genitourinary:  Negative except as documented in history of present illness.    Hematology/Lymphatics:  Negative.    Endocrine:  Negative.    Immunologic:  Negative.    Musculoskeletal:  Negative.    Neurologic:  Headache.       Health Status   Allergies:    Allergic Reactions (Selected)  Severity Not Documented  Metal (No reactions were documented)  No Known Medication Allergies   Medications:  (Selected)   Prescriptions  Prescribed  Lipitor 40 mg oral tablet: 1 tab(s) ( 40 mg ), PO, Daily, # 90 tab(s), 1 Refill(s), Type: Maintenance, Pharmacy: [facility] [id number], 1 tab(s) po daily  amLODIPine 2.5 mg oral tablet: 1 tab(s) ( 2.5 mg ), PO, Daily, # 90 tab(s), 1 Refill(s), Type: Maintenance, Pharmacy: [facility] [id number], 1 tab(s) po daily  Documented Medications  Documented  Flonase 50 mcg/inh nasal spray: 1 spray(s), nasal, daily, 0 " Refill(s), Type: Maintenance  Xyzal 5 mg oral tablet: 1 tab(s) ( 5 mg ), po, qpm, 0 Refill(s), Type: Maintenance,    Medications          *denotes recorded medication          amLODIPine 2.5 mg oral tablet: 2.5 mg, 1 tab(s), PO, Daily, 90 tab(s), 1 Refill(s).          Lipitor 40 mg oral tablet: 40 mg, 1 tab(s), PO, Daily, 90 tab(s), 1 Refill(s).          *Flonase 50 mcg/inh nasal spray: 1 spray(s), nasal, daily, 0 Refill(s).          *Xyzal 5 mg oral tablet: 5 mg, 1 tab(s), po, qpm, 0 Refill(s).     Problem list:    All Problems (Selected)  Allergic rhinitis, unspecified / SNOMED CT 255577037 / Confirmed  Pain in joint involving lower leg / SNOMED CT 719765112 / Confirmed  Back problem / SNOMED CT 546797404 / Confirmed  Gastrocnemius equinus / SNOMED CT 3555524075 / Confirmed  Disorder of sacrum / SNOMED CT 36968905 / Confirmed  Sleep disturbance / SNOMED CT 75479229 / Confirmed  Dyslipidemia / SNOMED CT 1698478618 / Confirmed  Facet syndrome / SNOMED CT 449242753 / Confirmed  Family history of heart attack / SNOMED CT 061079778 / Confirmed  Hip pain / SNOMED CT 08305986 / Confirmed  Hyperlipidemia, unspecified / SNOMED CT 91601030 / Confirmed  Lipidemia / SNOMED CT 39129247 / Confirmed  Knee pain / SNOMED CT 17489476 / Confirmed  Lumbago / SNOMED CT 263964474 / Confirmed  Lumbosacral neuritis / SNOMED CT 644528215 / Confirmed  Lumbosacral spondylosis / SNOMED CT 258745317 / Confirmed  Lumbosacral spondylosis without myelopathy / SNOMED CT 94237011 / Confirmed  Myalgia / SNOMED CT 537534273 / Confirmed  Myofascial pain / SNOMED CT 813350290 / Confirmed  Neuritis / SNOMED CT 540005600 / Confirmed  Nonspecific reaction to tuberculin skin test without active tuberculosis / SNOMED CT 2024832143 / Confirmed  Obesity / SNOMED CT 4742564542 / Probable  Pain in limb / SNOMED CT 954774260 / Confirmed  Leg pain / SNOMED CT 299153651 / Confirmed  Patellar tendinitis / SNOMED CT 65484510 / Confirmed  Patellofemoral syndrome /  SNOMED CT 1851409190 / Confirmed  Pes anserinus bursitis / SNOMED CT 615707260 / Confirmed  Plantar fasciitis / SNOMED CT 365308811 / Confirmed  Disturbance of skin sensation / SNOMED CT 716469961 / Confirmed  Uterine prolapse / SNOMED CT 56364549 / Confirmed      Histories   Past Medical History:    Active  Hyperlipidemia, unspecified (13206682): Onset on 7/18/2013 at 38 years.  Disorder of sacrum (76286976): Onset on 1/24/2012 at 36 years.  Lumbosacral spondylosis without myelopathy (95122475): Onset on 1/3/2012 at 36 years.  Back problem (144317312): Onset on 1/3/2012 at 36 years.  Sleep disturbance (66082149): Onset on 6/27/2011 at 36 years.  Myalgia (546277630): Onset on 6/27/2011 at 36 years.  Pain in joint involving lower leg (033253942): Onset on 1/21/2011 at 35 years.  Neuritis (660697958): Onset on 12/3/2010 at 35 years.  Comments:  12/14/2017 CST 9:40 AM Leny León  Thoracic or lumbosacral  Disturbance of skin sensation (282107733): Onset on 11/23/2010 at 35 years.  Lumbago (912612923): Onset on 9/22/2010 at 35 years.  Pain in limb (352183434): Onset on 9/7/2010 at 35 years.  Nonspecific reaction to tuberculin skin test without active tuberculosis (8114474821): Onset on 1/29/2010 at 35 years.  Allergic rhinitis, unspecified (748387919)  Patellar tendinitis (00596152)  Pes anserinus bursitis (029861921)  Knee pain (68031756)  Hip pain (77010761)  Facet syndrome (043648193)  Comments:  12/14/2017 CST 9:50 AM Leny León  Lumbar  Lumbosacral spondylosis (121893390)  Leg pain (891341335)  Patellofemoral syndrome (6278299122)  Gastrocnemius equinus (0601214539)  Dyslipidemia (6723354874)  Lumbosacral neuritis (891402692)  Myofascial pain (175571552)  Plantar fasciitis (847030817)  Uterine prolapse (98209542)  Resolved  Chronic sinusitis, unspecified (99169845): Onset on 2/16/2010 at 35 years.  Resolved.  Other Disorders of Menstruation and Other Abnormal Bleeding from Female Genital Tract (626.8):   Resolved.  Uterine Prolapse without Mention of Vaginal Wall Prolapse (618.1):  Resolved.  Pregnancy:  Resolved.  Tobacco Use Disorder (305.1):  Resolved.  Pilonidal cyst with abscess (507801108):  Resolved.  Pregnancy (112182064):  Resolved in  at 28 years.  Pregnancy (907658497):  Resolved in  at 29 years.  History of tobacco use (5166285359):  Resolved.  Dysfunctional uterine bleeding (83161343):  Resolved.  Pilonidal cyst with abscess (327257550):  Resolved.   Family History:    Cardiac pacemaker  Father (Jacobo)  Comments:  2017 10:17 AM - Leny Cherry  Irregular heart beat  Diabetes mellitus  Mother (Bianka, )  Heart disease  Father (Jacobo)  Comments:  2017 10:38 AM - Leny Cherry  Bypass surgery at the age of 40.    2017 10:16 AM - Leny Cherry  Irregular heart beat  Grandfather (P)  Comments:  2017 10:35 AM - Leny Cherry  Heart attack at the age of 50.  Grandmother (P) ()  Comments:  2017 10:35 AM - Leny Cherry  Heart attack at the age of 30.  Mother (Bianka, )  Comments:  2017 10:16 AM - Leny Cherry  Bypass graft  High blood pressure  Father (Jacobo)  Mother (Bianka, )  CA - Cancer  Mother (Bianka, )  MS - Multiple sclerosis  Sister  Mother (Bianka, )  Aunt (M)  Colon Polyps  Mother (Bianka, )  Kidney Disease  Mother (Bianka, )  Comments:  2017 10:37 AM - Leny Cherry  Kidney failure  Colon cancer  Grandfather (M) ()  Great Uncle (P) ()  Great Grandfather (M)     Procedure history:    Cardiac computed tomography for calcium scoring (4046430125) on 2017 at 42 Years.  Comments:  2017 1:58 PM - Shantel Pizano  Total Agatston calcium score is 8.  THR - Total hip replacement (170245202) in  at 39 Years.  Comments:  2017 10:58 AM - Onelia Espino  left  Pilonidal cyst (7448594582) on 2012 at 37 Years.  Comments:  2017 10:41 RONAK - Leny Cherry.  No  abscess.  Nerve conduction study (53416061) on 9/2/2010 at 35 Years.  Cystoscopy (56657089) on 9/14/2009 at 34 Years.  Hysterectomy (091588529) on 6/7/2007 at 32 Years.  Comments:  12/14/2017 10:44 AM - Cherry Leny  Right salpingo-oophorectomy.   Social History:        Alcohol Assessment            1 drink every 3 months or more.      Tobacco Assessment            Former smoker, quit more than 30 days ago, Cigarettes, Started age 13 Years.  Stopped age 33 Years.      Employment and Education Assessment            Employed, Work/School description: [employer].            Employed, Work/School description: CMA.      Exercise and Physical Activity Assessment            Exercise frequency: 2-3 x per wk..        Physical Examination   Vital Signs   3/29/2018 4:18 PM CDT    Mean Arterial Pressure    111 mmHg     Measurements from flowsheet : Measurements   3/29/2018 4:18 PM CDT Height Measured 63 in    Weight Measured 187 lb    BSA 1.94 m2    Body Mass Index 33.12 kg/m2  HI      General:  Alert and oriented, No acute distress.    Eye:  Pupils are equal, round and reactive to light.    HENT:  Normocephalic, Oral mucosa is moist, Sinus tenderness, Wax in ear canals bilaterally making it difficult to visualize TMs.    Neck:  Supple, Non-tender.    Respiratory:  Lungs are clear to auscultation.    Cardiovascular:  Normal rate, Regular rhythm, No murmur, No gallop.    Integumentary:  Warm, Dry, Pink.    Neurologic:  Alert, Oriented, Normal sensory, No focal deficits.    Psychiatric:  Appropriate mood & affect.       Impression and Plan   Diagnosis     Acute recurrent sinusitis (GHB55-VO J01.91).     Summary:  since she was on amoxicillin 2 months ago, will treat with cefuroxime and atrovent nasal spray  will also refer for allergy consult.    Orders     Orders   Pharmacy:  Atrovent 42 mcg/inh nasal spray (Prescribe): 2 spray(s), nasal, qid, PRN: for nasal congestion, # 1 EA, 2 Refill(s), Type: Maintenance, Pharmacy: [email or web address] [id number], 2 spray(s) nasal qid,PRN:for nasal congestion  cefuroxime 500 mg oral tablet (Prescribe): 1 tab(s) ( 500 mg ), PO, BID, x 10 day(s), # 20 tab(s), 0 Refill(s), Type: Maintenance, [facility] [id number], 1 tab(s) po bid,x10 day(s).     Orders   Requests (Consults / Referrals):  Referral (Request) (Order): 3/29/2018 4:49 PM CDT, Referred to: Allergy & Immunology, Additional instructions: best phone number is [phone], Allergic rhinitis, unspecified  Acute recurrent sinusitis.     Orders   Charges (Evaluation and Management):  12001 office outpatient visit 15 minutes (Charge) (Order): Quantity: 1, Allergic rhinitis, unspecified  Acute recurrent sinusitis.

## 2022-02-16 NOTE — TELEPHONE ENCOUNTER
Entered by Rere Case on December 04, 2020 4:12:58 PM CST  Med Refill      Date of last office visit and reason:  9/30/20 for gastritis with TFS      Date of last Med Check / Px:   5/19/20 for chronic disease visit with TFS.   Date of last labs pertaining to med: BMP 5/2020    RTC order in chart:  Yes, due now.  I spoke with patient, advised her to schedule a video visit now and will plan to do Lipid with her next BMP in May.  Patient agrees and was transferred to scheduling.  She does not need meds filled at this time.  Will fill at time of visit.     For Protocol refill, has patient been contacted:  _    Medication filled or not filled per clinic policy.       ------------------------------------------  From: [facility] #[id number]  To: Augustin Chatman MD  Sent: December 4, 2020 12:57:09 PM CST  Subject: Medication Management  Due: December 2, 2020 4:43:08 PM CST     ** On Hold Pending Signature **     Dispensed Drug: amLODIPine (amLODIPine 5 mg oral tablet), TAKE 1 TABLET BY MOUTH DAILY  Quantity: 90 tab(s)  Days Supply: 90  Refills: 0  Substitutions Allowed  Notes from Pharmacy:  ------------------------------------------

## 2022-02-16 NOTE — PROGRESS NOTES
Patient:   YESSENIA RIDDLE            MRN: [id number]            FIN: [id number]               Age:   42 years     Sex:  Female     :  1975   Associated Diagnoses:   Bursitis of both knees   Author:   Augustin Chatman MD      Visit Information      Date of Service: 2017 02:13 pm  Performing Location: [facility]  [employer] [facility]  Encounter#: [id number]      Chief Complaint   2017 2:16 PM CST   Establish care. Bilateral knee pain. Hx hip replacement.        History of Present Illness   Started working out a few weeks ago  Both knees sore and grind  no injury or swelling  No limping  FHX early heart disease      Review of Systems   Constitutional:  Negative except as documented in history of present illness.    Integumentary:  Negative.    Neurologic:  Negative.       Health Status   Allergies:    Allergic Reactions (Selected)  No Known Medication Allergies   Medications:  (Selected)   Prescriptions  Prescribed  predniSONE 10 mg oral tablet: See Instructions, Instructions: 4 tab(s) daily for 3 days, 3 tab(s) daily for 3 days, 2 tab(s) daily x3 days, 1 tab daily x3 days, # 30 EA, 0 Refill(s), Type: Maintenance, Pharmacy: [facility] [address], 4 tab(s) daily for 3 days, 3 tab(s) daily...   Problem list:    All Problems  Family history of heart attack / SNOMED CT 530452874 / Confirmed  Obesity / SNOMED CT 2144977370 / Probable      Histories   Past Medical History:    No active or resolved past medical history items have been selected or recorded.   Family History:    No family history items have been selected or recorded.   Procedure history:    No active procedure history items have been selected or recorded.   Social History:        Tobacco Assessment            Former smoker, quit more than 30 days ago, Cigarettes, Started age 13 Years.  Stopped age 33 Years.        Physical Examination   Vital Signs   2017 2:16 PM CST Temperature Tympanic 97.3 DegF  LOW    Peripheral Pulse Rate 99 bpm    HR  Method Electronic    Systolic Blood Pressure 131 mmHg    Diastolic Blood Pressure 87 mmHg    Mean Arterial Pressure 102 mmHg    BP Method Electronic      Measurements from flowsheet : Measurements   11/22/2017 2:16 PM CST Height Measured - Standard 63 in    Weight Measured - Standard 181.2 lb    BSA 1.91 m2    Body Mass Index 32.09 kg/m2      General:  Alert and oriented, No acute distress.    Musculoskeletal:       Lower extremity exam: Knee ( Bilateral, No effusion, No swelling, Tenderness (subpatellar), Normal range of motion, all neg ).    Neurologic:  Alert, Oriented.       Impression and Plan   Diagnosis     Bursitis of both knees ([id number] M70.51).     Course:  Not progressing as expected.    Plan:  pred taper  follow bp  fu 1 mo.         Refer to: Physical therapy.    Patient Instructions:       Counseled: Patient, Regarding diagnosis, Regarding medications.

## 2022-02-16 NOTE — NURSING NOTE
CAGE Assessment Entered On:  5/19/2020 10:22 AM CDT    Performed On:  5/19/2020 10:22 AM CDT by Dallas Rodriges CMA               Assessment   Have you ever felt you should cut down on your drinking :   No   Have people annoyed you by criticizing your drinking :   No   Have you ever felt bad or guilty about your drinking :   No   Have you ever taken a drink first thing in the morning to steady your nerves or get rid of a hangover (Eye-opener) :   No   CAGE Score :   0    Dallas Rodriges CMA - 5/19/2020 10:22 AM CDT

## 2022-02-16 NOTE — PROGRESS NOTES
Patient:   YESSENIA RIDDLE            MRN: [id number]            FIN: [id number]               Age:   45 years     Sex:  Female     :  1975   Associated Diagnoses:   Neck pain   Author:   Beto SANCHEZ, Roberto KING      Visit Information   Visit type:  Telephone Encounter.    Source of history:  Patient.    Location of patient:  home_  Call Start Time:   _1445  Call End Time:    _1458      Chief Complaint   _ongoing neck pain      History of Present Illness   Today's visit was conducted via telephone due to the COVID-19 pandemic.   3 month hx of neck pain, mostly on left side before PT but now on right side  has been using cyclobenzaprine, using tylenol, has tried lidocaine patches  has had at least 2 predinisone taper  had recent MRI which showed  2 small disc herniations(likely old) but nothing is toughing spinal cord or nerves to cause pain other than neck pain.  has seen PT for a few weeks, pain in shoulder improved but pain in neck has continued, pain radiates down middle of back and some radiation down right shoulder  not sleeping due to pain  trouble looking down  having headaches, pain 8/10  is not getting relief from the cyclobenzaprine      Reason for visit:  _      Review of Systems   Constitutional:  Negative.    Ear/Nose/Mouth/Throat:  Negative.    Respiratory:  Negative.    Musculoskeletal:  Neck pain.       Impression and Plan   Diagnosis     Neck pain (QKN24-UP M54.2).     Course:  Worsening.    Summary:  will stop the cyclobenzaprine and switch to tizanidine, and will add gabapentin  follow up if not improving  would consider referral to spine center if not improving   .    Orders     Orders   Pharmacy:  gabapentin 300 mg oral capsule (Prescribe): = 1 cap(s) ( 300 mg ), Oral, tid, Instructions: take 1 po qhs day 1  then 1 po bid day 2  and then 1 po tid, # 90 cap(s), 1 Refill(s), Type: Maintenance, Pharmacy: [facility] #[id number], 1 cap(s) Oral tid,Instr:take 1 po qhs day 1; then 1 po bid day 2;  and then 1 po tid  tiZANidine 4 mg oral tablet (Prescribe): = 2 tab(s) ( 8 mg ), Oral, q8 hrs, PRN: for muscle pain, # 60 tab(s), 1 Refill(s), Type: Maintenance, Pharmacy: [facility] #[id number], 2 tab(s) Oral q8 hrs,PRN:for muscle pain  cyclobenzaprine 10 mg oral tablet (Discontinue).     Orders   Charges (Evaluation and Management):  11554 physician telephone evaluation 11-20 min (Charge) (Order): Quantity: 1, Neck pain.        Health Status   Allergies:    Allergic Reactions (Selected)  Severity Not Documented  Metal (No reactions were documented)  No Known Medication Allergies   Medications:  (Selected)   Prescriptions  Prescribed  amLODIPine 5 mg oral tablet: = 1 tab(s) ( 5 mg ), Oral, daily, # 90 tab(s), 1 Refill(s), Type: Maintenance, Pharmacy: [facility] #[id number], 1 tab(s) Oral daily  atorvastatin 40 mg oral tablet: = 1 tab(s), Oral, daily, # 90 tab(s), 0 Refill(s), Type: Maintenance, Pharmacy: [facility] #[id number]  cyclobenzaprine 10 mg oral tablet: = 1 tab(s) ( 10 mg ), PO, q8 hrs, PRN: for spasm, # 30 tab(s), 3 Refill(s), Type: Maintenance, Pharmacy: [facility] #[id number], 1 tab(s) Oral q8 hrs,PRN:for spasm  predniSONE 10 mg oral tablet: 4 tabs daily for 3 days taper bey 1 tab every 3 days, Oral, daily, # 30 tab(s), 0 Refill(s), Type: Maintenance, Pharmacy: [facility] #[id number], 4 tabs daily for 3 days taper bey 1 tab every 3 days Oral daily  Documented Medications  Documented  Fish Oil 1000 mg oral capsule: = 1 cap(s) ( 1,000 mg ), Oral, daily, 0 Refill(s), Type: Maintenance  aspirin 81 mg oral tablet: 1 tab(s) ( 81 mg ), PO, Daily, # 30 tab(s), 0 Refill(s), Type: Maintenance   Problem list:    All Problems  Allergic rhinitis, unspecified / SNOMED CT 013651663 / Confirmed  Lumbosacral neuritis / SNOMED CT 901188951 / Confirmed  Hypertension goal BP (blood pressure) < 130/80 / SNOMED CT 0330911745 / Confirmed  Obesity / SNOMED CT 5099966291 / Probable  GERD without  esophagitis / SNOMED CT 2762995657 / Confirmed  Nonspecific reaction to tuberculin skin test without active tuberculosis / SNOMED CT 6838912613 / Confirmed  Gastrocnemius equinus / SNOMED CT 8725551874 / Confirmed  Abdominal pain / SNOMED CT 38417350 / Confirmed  Family history of heart attack / SNOMED CT 032823169 / Confirmed  Myofascial pain / SNOMED CT 409329629 / Confirmed  Lumbosacral spondylosis without myelopathy / SNOMED CT 70737978 / Confirmed  Sleep disturbance / SNOMED CT 94033118 / Confirmed  Hyperlipidemia, unspecified / SNOMED CT 99684050 / Confirmed  Resolved: Pregnancy  Resolved: Myalgia / SNOMED CT 310142026  Resolved: Dyslipidemia / SNOMED CT 3795080884  Resolved: Pes anserinus bursitis / SNOMED CT 212204611  Resolved: Neuritis / SNOMED CT 202893242  Resolved: Pilonidal cyst with abscess / SNOMED CT 576041544  Resolved: Pilonidal cyst with abscess / SNOMED CT 591115070  Resolved: Pain in limb / SNOMED CT 541254650  Resolved: Lumbosacral spondylosis / SNOMED CT 400722913  Resolved: Disorder of sacrum / SNOMED CT 35860147  Resolved: Patellofemoral syndrome / SNOMED CT 7218328113  Resolved: History of tobacco use / SNOMED CT 0375536068  Resolved: Leg pain / SNOMED CT 502423989  Resolved: Tobacco Use Disorder / ICD-9-.1  Resolved: Plantar fasciitis / SNOMED CT 969575071  Resolved: Dysfunctional uterine bleeding / SNOMED CT 59188050  Resolved: H. pylori infection / SNOMED CT 2123202163  Resolved: Facet syndrome / SNOMED CT 947363064  Resolved: Pain in joint involving lower leg / SNOMED CT 093684443  Resolved: Lumbago / SNOMED CT 388000421  Resolved: Uterine prolapse / SNOMED CT 30507331  Resolved: Pregnancy / SNOMED CT 248239853  Resolved: Pregnancy / SNOMED CT 815377525  Resolved: Back problem / SNOMED CT 977001728  Resolved: Disturbance of skin sensation / SNOMED CT 770277229  Resolved: Knee pain / SNOMED CT 49445481  Resolved: Uterine Prolapse without Mention of Vaginal Wall Prolapse / ICD-9-CM  618.1  Resolved: Other Disorders of Menstruation and Other Abnormal Bleeding from Female Genital Tract / ICD-9-.8  Resolved: Patellar tendinitis / SNOMED CT 13820414  Resolved: Chronic sinusitis, unspecified / SNOMED CT 04870679  Resolved: Hip pain / SNOMED CT 22410145  Resolved: Lipidemia / SNOMED CT 31698336  Canceled: Obesity / SNOMED CT 9265308620  Canceled: Family History of Other Cardiovascular Diseases / ICD-9-CM V17.49      Histories   Past Medical History:    Active  Hyperlipidemia, unspecified (44390292): Onset on 7/18/2013 at 38 years.  Lumbosacral spondylosis without myelopathy (80725928): Onset on 1/3/2012 at 36 years.  Sleep disturbance (80046038): Onset on 6/27/2011 at 36 years.  Nonspecific reaction to tuberculin skin test without active tuberculosis (6185178210): Onset on 1/29/2010 at 35 years.  Allergic rhinitis, unspecified (508988261)  Gastrocnemius equinus (6023370611)  Lumbosacral neuritis (423592249)  Myofascial pain (895904046)  Resolved  Disorder of sacrum (50265305): Onset on 1/24/2012 at 36 years.  Resolved.  Back problem (199650477): Onset on 1/3/2012 at 36 years.  Resolved.  Myalgia (550448521): Onset on 6/27/2011 at 36 years.  Resolved.  Pain in joint involving lower leg (718850412): Onset on 1/21/2011 at 35 years.  Resolved.  Neuritis (419409426): Onset on 12/3/2010 at 35 years.  Resolved.  Comments:  12/14/2017 CST 9:40 AM CST - Leny Cherry  Thoracic or lumbosacral  Disturbance of skin sensation (899150082): Onset on 11/23/2010 at 35 years.  Resolved.  Lumbago (435636730): Onset on 9/22/2010 at 35 years.  Resolved.  Pain in limb (930271795): Onset on 9/7/2010 at 35 years.  Resolved.  Chronic sinusitis, unspecified (77888507): Onset on 2/16/2010 at 35 years.  Resolved.  Other Disorders of Menstruation and Other Abnormal Bleeding from Female Genital Tract (626.8):  Resolved.  Uterine Prolapse without Mention of Vaginal Wall Prolapse (618.1):  Resolved.  Pregnancy:   Resolved.  Tobacco Use Disorder (305.1):  Resolved.  Patellar tendinitis (44111911):  Resolved.  Pes anserinus bursitis (655851017):  Resolved.  Pilonidal cyst with abscess (181444312):  Resolved.  Pregnancy (044803857):  Resolved in  at 28 years.  Pregnancy (279392282):  Resolved in  at 29 years.  Lipidemia (50709035):  Resolved.  Knee pain (20924622):  Resolved.  Hip pain (66429506):  Resolved on 3/7/2019 at 44 years.  Facet syndrome (398086372):  Resolved.  Comments:  2017 CST 9:50 AM Leny León  Lumbar  Lumbosacral spondylosis (349327918):  Resolved.  Leg pain (912224544):  Resolved.  Patellofemoral syndrome (4532603678):  Resolved.  Dyslipidemia (9759788928):  Resolved.  Plantar fasciitis (664558074):  Resolved.  History of tobacco use (3237789720):  Resolved.  Dysfunctional uterine bleeding (45002187):  Resolved.  Pilonidal cyst with abscess (154468428):  Resolved.  Uterine prolapse (95335008):  Resolved.  H. pylori infection (0387472293):  Resolved.   Family History:    Cardiac pacemaker  Father (Jacobo)  Comments:  2017 10:17 AM CST Leny Francisco  Irregular heart beat  Diabetes mellitus  Mother (Bianka, )  Heart disease  Father (Jacobo)  Comments:  2017 10:38 AM CST Leny Francisco  Bypass surgery at the age of 40.    2017 10:16 AM CST Leny Francisco  Irregular heart beat  Grandfather (P)  Comments:  2017 10:35 AM CST Leny Francisco  Heart attack at the age of 50.  Grandmother (P) ()  Comments:  2017 10:35 AM CST Leny Francisco  Heart attack at the age of 30.  Mother (Bianka, )  Comments:  2017 10:16 AM CST Leny Francisco  Bypass graft  High blood pressure  Father (Jacobo)  Mother (Bianka, )  CA - Cancer  Mother (iBanka, )  MS - Multiple sclerosis  Sister  Mother (Bianka, )  Aunt (M)  Colon Polyps  Mother (Bianka, )  Kidney Disease  Mother (Bianka, )  Comments:  2017 10:37 AM NIKKIE Cherry ,  Leny  Kidney failure  Colon cancer  Grandfather (M) ()  Great Uncle (P) ()  Great Grandfather (M)     Procedure history:    Cardiac computed tomography for calcium scoring (3217524060) on 2017 at 42 Years.  Comments:  2017 1:58 PM NIKKIE - Harinder Shantel  Total Agatston calcium score is 8.  THR - Total hip replacement (985148736) in  at 39 Years.  Comments:  2017 10:58 AM NIKKIE - Onelia Espino  left  Pilonidal cyst (7978967349) on 2012 at 37 Years.  Comments:  2017 10:41 AM Leny León  Excision.  No abscess.  Nerve conduction study (88054057) on 2010 at 35 Years.  Cystoscopy (87309952) on 2009 at 34 Years.  Hysterectomy (697414474) on 2007 at 32 Years.  Comments:  2017 10:44 AM Leny León  Right salpingo-oophorectomy.   Social History:        Alcohol Assessment            1 drink every 3 months or more.      Tobacco Assessment            Former smoker, quit more than 30 days ago, Cigarettes, Started age 13 Years.  Stopped age 33 Years.      Employment and Education Assessment            Employed, Work/School description: C[employer].            Employed, Work/School description: C[employer].      Exercise and Physical Activity Assessment            Exercise frequency: 2-3 x per wk..

## 2022-02-16 NOTE — PROGRESS NOTES
Patient:   YESSENIA RIDDLE            MRN: [id number]            FIN: [id number]               Age:   46 years     Sex:  Female     :  1975   Associated Diagnoses:   Left leg pain   Author:   Augustin Chatman MD      Visit Information      Date of Service: 2021 01:54 pm  Performing Location: [facility]  Encounter#: [id number]      Primary Care Provider (PCP):  Augustin Chatman MD    NPI# [id number]      Referring Provider:  Augustin Chatman MD, NPI# [id number]      Chief Complaint   2021 2:00 PM CST    Right leg pain, concern for blood clot.      History of Present Illness   Patient is here with left leg pain.  She notes for the past few days she has had some pain in her left posterior thigh.  It sore and tender makes her limp little bit no injury she has a history of varicosities in the past but no DVT issues.  She spoke with the vascular people and I did follow her and they recommended she come in to get checked out for blood clot.  No chest pain no shortness of breath no new swelling         Review of Systems   Constitutional:  Negative.    Respiratory:  Negative.    Cardiovascular:  Negative.    Musculoskeletal:  Negative except as documented in history of present illness.    Integumentary:  Negative.    Neurologic:  Negative.       Health Status   Allergies:    Allergic Reactions (Selected)  Severity Not Documented  Metal (No reactions were documented)  No Known Medication Allergies   Medications:  (Selected)   Prescriptions  Prescribed  amLODIPine 5 mg oral tablet: = 1 tab(s) ( 5 mg ), Oral, daily, # 90 tab(s), 1 Refill(s), Type: Maintenance, Pharmacy: [facility] #[id number], 1 tab(s) Oral daily, 63, in, 20 14:15:00 CST, Height Measured, 133.6, lb, 20 16:06:00 CDT, Weight Measured  atorvastatin 40 mg oral tablet: = 1 tab(s), Oral, daily, # 90 tab(s), 1 Refill(s), Type: Maintenance, Pharmacy: [facility] #[id number], 1 tab(s) Oral daily, 63, in, 20  14:15:00 CST, Height Measured, 133.6, lb, 09/30/20 16:06:00 CDT, Weight Measured  celecoxib 100 mg oral capsule: = 1 cap(s), Oral, bid, # 180 cap(s), 1 Refill(s), Type: Maintenance, Pharmacy: [facility] #[id number], 1 cap(s) Oral bid, 63, in, 12/08/20 14:15:00 CST, Height Measured, 133.6, lb, 09/30/20 16:06:00 CDT, Weight Measured  gabapentin 300 mg oral capsule: = 1 cap(s), Oral, tid, # 270 cap(s), 1 Refill(s), Type: Maintenance, Pharmacy: [facility] #[id number], 1 cap(s) Oral tid, 63, in, 12/08/20 14:15:00 CST, Height Measured, 133.6, lb, 09/30/20 16:06:00 CDT, Weight Measured  tiZANidine 4 mg oral tablet: = 2 tab(s), Oral, q8 hrs, PRN: AS NEEDED FOR MUSCLE PAIN, # 60 tab(s), 5 Refill(s), Type: Maintenance, Pharmacy: [facility] #[id number], 2 tab(s) Oral q8 hrs,PRN:AS NEEDED FOR MUSCLE PAIN, 63, in, 12/08/20 14:15:00 CST, Height Measured, 133.6, lb...  Documented Medications  Documented  Fish Oil 1000 mg oral capsule: = 1 cap(s) ( 1,000 mg ), Oral, daily, 0 Refill(s), Type: Maintenance  aspirin 81 mg oral tablet: 1 tab(s) ( 81 mg ), PO, Daily, # 30 tab(s), 0 Refill(s), Type: Maintenance   Problem list:    All Problems (Selected)  Abdominal pain / SNOMED CT 23357214 / Confirmed  Allergic rhinitis, unspecified / SNOMED CT 366070766 / Confirmed  Gastrocnemius equinus / SNOMED CT 7274194532 / Confirmed  Sleep disturbance / SNOMED CT 30598537 / Confirmed  Family history of heart attack / SNOMED CT 052745720 / Confirmed  GERD without esophagitis / SNOMED CT 4965817644 / Confirmed  Hyperlipidemia, unspecified / SNOMED CT 52186550 / Confirmed  Hypertension goal BP (blood pressure) < 130/80 / SNOMED CT 4402775476 / Confirmed  Lumbosacral neuritis / SNOMED CT 422679443 / Confirmed  Lumbosacral spondylosis without myelopathy / SNOMED CT 14348801 / Confirmed  Myofascial pain / SNOMED CT 068286629 / Confirmed  Nonspecific reaction to tuberculin skin test without active tuberculosis / SNOMED CT 3348249212 /  Confirmed  Obesity / SNOMED CT 3090008337 / Probable      Histories   Past Medical History:    Active  Hyperlipidemia, unspecified (45798372): Onset on 7/18/2013 at 38 years.  Lumbosacral spondylosis without myelopathy (92845478): Onset on 1/3/2012 at 36 years.  Sleep disturbance (53685145): Onset on 6/27/2011 at 36 years.  Nonspecific reaction to tuberculin skin test without active tuberculosis (2446765871): Onset on 1/29/2010 at 35 years.  Allergic rhinitis, unspecified (805507045)  Gastrocnemius equinus (6092852562)  Lumbosacral neuritis (388797194)  Myofascial pain (103180065)  Resolved  Disorder of sacrum (67472541): Onset on 1/24/2012 at 36 years.  Resolved.  Back problem (413246505): Onset on 1/3/2012 at 36 years.  Resolved.  Myalgia (819531392): Onset on 6/27/2011 at 36 years.  Resolved.  Pain in joint involving lower leg (691066855): Onset on 1/21/2011 at 35 years.  Resolved.  Neuritis (644237886): Onset on 12/3/2010 at 35 years.  Resolved.  Comments:  12/14/2017 CST 9:40 AM CST - Leny Cherry  Thoracic or lumbosacral  Disturbance of skin sensation (704137861): Onset on 11/23/2010 at 35 years.  Resolved.  Lumbago (627570844): Onset on 9/22/2010 at 35 years.  Resolved.  Pain in limb (154380686): Onset on 9/7/2010 at 35 years.  Resolved.  Chronic sinusitis, unspecified (16951864): Onset on 2/16/2010 at 35 years.  Resolved.  Other Disorders of Menstruation and Other Abnormal Bleeding from Female Genital Tract (626.8):  Resolved.  Uterine Prolapse without Mention of Vaginal Wall Prolapse (618.1):  Resolved.  Pregnancy:  Resolved.  Tobacco Use Disorder (305.1):  Resolved.  Patellar tendinitis (28513110):  Resolved.  Pes anserinus bursitis (692078344):  Resolved.  Pilonidal cyst with abscess (584072105):  Resolved.  Pregnancy (813506731):  Resolved in 2004 at 28 years.  Pregnancy (925113368):  Resolved in 2005 at 29 years.  Lipidemia (63418647):  Resolved.  Knee pain (49050639):  Resolved.  Hip pain (45351834):   Resolved on 3/7/2019 at 44 years.  Facet syndrome (682952704):  Resolved.  Comments:  2017 CST 9:50 AM Leny León  Lumbar  Lumbosacral spondylosis (213557300):  Resolved.  Leg pain (767693733):  Resolved.  Patellofemoral syndrome (5273658222):  Resolved.  Dyslipidemia (3092456305):  Resolved.  Plantar fasciitis (268036354):  Resolved.  History of tobacco use (9894699010):  Resolved.  Dysfunctional uterine bleeding (90223932):  Resolved.  Pilonidal cyst with abscess (754449857):  Resolved.  Uterine prolapse (15846568):  Resolved.  H. pylori infection (1900792753):  Resolved.   Family History:    Cardiac pacemaker  Father (Jacobo)  Comments:  2017 10:17 AM Leny León  Irregular heart beat  Diabetes mellitus  Mother (Bianka, )  Heart disease  Father (Jacobo)  Comments:  2017 10:38 AM Leny León  Bypass surgery at the age of 40.    2017 10:16 AM Leny León  Irregular heart beat  Grandfather (P)  Comments:  2017 10:35 AM Leny León  Heart attack at the age of 50.  Grandmother (P) ()  Comments:  2017 10:35 AM Leny León  Heart attack at the age of 30.  Mother (Bianka, )  Comments:  2017 10:16 AM Leny León  Bypass graft  High blood pressure  Father (Jacobo)  Mother (Bianka, )  CA - Cancer  Mother (Bianka, )  MS - Multiple sclerosis  Sister  Mother (Bianka, )  Aunt (M)  Colon Polyps  Mother (Bianka, )  Kidney Disease  Mother (Bianka, )  Comments:  2017 10:37 AM Leny León  Kidney failure  Colon cancer  Grandfather (M) ()  Great Uncle (P) ()  Great Grandfather (M)     Procedure history:    Cardiac computed tomography for calcium scoring (SNOMED CT 8924866633) on 2017 at 42 Years.  Comments:  2017 1:58 PM CST - Shantel Pizano  Total Agatston calcium score is 8.  THR - Total hip replacement ([facility] CT 340743690) in 2015 at 39  Years.  Comments:  11/24/2017 10:58 AM Onelia Hewitt  left  Pilonidal cyst (SNOMED CT 2099320010) on 7/17/2012 at 37 Years.  Comments:  12/14/2017 10:41 AM NIKKIE - Leny Cherry  Excision.  No abscess.  Nerve conduction study (SNOMED CT 34762910) on 9/2/2010 at 35 Years.  Cystoscopy (SNOMED CT 48987522) on 9/14/2009 at 34 Years.  Hysterectomy (SNOMED CT 609775614) on 6/7/2007 at 32 Years.  Comments:  12/14/2017 10:44 AM NIKKIE Dami Leny Cherry  Right salpingo-oophorectomy.   Social History:        Electronic Cigarette/Vaping Assessment            Electronic Cigarette Use: Never.      Alcohol Assessment            1 drink every 3 months or more.      Tobacco Assessment            Former smoker, quit more than 30 days ago, Stopped age About 33 Years.      Employment and Education Assessment            Employed, Work/School description: CMA.            Employed, Work/School description: CMA.      Exercise and Physical Activity Assessment            Exercise frequency: 2-3 x per wk..        Physical Examination   Measurements from flowsheet : Measurements   2/24/2021 2:00 PM CST Height Measured - Standard 63 in    Weight Measured - Standard 130.0 lb    BSA 1.62 m2    Body Mass Index 23.03 kg/m2      General:  Alert and oriented, No acute distress.    Respiratory:  Respirations are non-labored.    Cardiovascular:  Good pulses equal in all extremities, Normal peripheral perfusion, No edema.    Musculoskeletal:       Lower extremity exam: Thigh ( Left, Posterior, Distal, No swelling, Tenderness, cms otherwise intact  doroteo gifford ).    Neurologic:  Alert, Oriented.       Review / Management   Radiology results   Ultrasound, Reveals no acute disease process      Impression and Plan   Diagnosis     Left leg pain (VYU00-XH M79.605).     Course:  Progressing as expected.    Plan:  Patient with left leg pain DVT is ruled out with normal ultrasound. Likely musculoskeletal strain doubt nerve impingement from back. Symptomatic care  at home  fu 1 week if not better sooner if worse.    Patient Instructions:       Counseled: Patient, Regarding diagnosis, Regarding treatment, Regarding medications, Activity.

## 2022-02-16 NOTE — TELEPHONE ENCOUNTER
---------------------  From: Santos AGRAWAL Claudette   Sent: 11/18/2021 2:22:12 PM CST  [facility]     Pt was seen at [facility] today for covid testing per Dr. Chatman. 02 Sat=98%. Pt resides in [facility]-will notify Public Health if positive.

## 2022-02-16 NOTE — PROGRESS NOTES
Chief Complaint  Pt c/o sinus infection like symptoms. Has head pressure, sinus pressure, no fevers, sinus plugged.  History of Present Illness  Unsure if allergies increased or sinus infection now. Having sinus pressure 3 weeks with post nasal drip. Seen by Allergist this year. Had a sinus infection last year.  Review of Systems  Reacted to cement from hip replacement. Diagnosed with Allergic Rhinitis. Always having a runny nose. Takes Benadryl in the summer.  Teeth started hurting last night. Denies fevers.  Problem List/Past Medical History  Ongoing  Allergic Rhinitis, Cause Unspecified  Disorders of Sacrum  Disturbance of Skin Sensation  Family History of Other Cardiovascular Diseases  LUMBAGO  Lumbosacral Spondylosis without Myelopathy  Myalgia and Myositis, Unspecified  Nonspecific Reaction to Test for Tuberculosis  Obesity  Other and Unspecified Hyperlipidemia  Other Symptoms Referable to Back  Pain in Joint Involving Lower Leg  Pain in Limb  Patellar Tendinitis  Pes Anserinus Tendinitis or Bursitis  Thoracic or Lumbosacral Neuritis or Radiculitis, Unspecified  Unspecified Sleep Disturbance  Resolved  Other Disorders of Menstruation and Other Abnormal Bleeding from Female Genital Tract  Pilonidal Cyst with Abscess  Pregnancy  Tobacco Use Disorder  Unspecified Sinusitis (Chronic)  Uterine Prolapse without Mention of Vaginal Wall Prolapse  Procedure/Surgical History  Home Medications  simvastatin 40 mg oral tablet, 40 mg, 1 tab(s), po, daily  Allergies  No known allergies  Social History  Nonsmoker  Family History  Immunizations  Physical Exam  Vitals & Measurements  T:?96.3?(Tympanic)?  HR:?83?(Peripheral)?  BP:?126/84?  HT:?63?in?  WT:?172?lb?  BMI:?30.47?  General: No acute distress.  HENT: Tympanic membranes are clear, No pharyngeal erythema.  Neck: No lymphadenopathy.  Respiratory: Lungs are clear to auscultation.  Cardiovascular: Normal rate, Regular rhythm.  Musculoskeletal: Normal gait.  Lab  Results  Diagnostic Results  Assessment/Plan  Acute frontal sinusitis  Discussed Sudafed, chlorpheniramine and NETI pot. ?Will start Augmentin for 7 days as well. ?Follow-up if not improving.  Allergic rhinitis  Start Flonase.

## 2022-02-16 NOTE — NURSING NOTE
Comprehensive Intake Entered On:  11/18/2021 9:25 AM CST    Performed On:  11/18/2021 9:24 AM CST by Anum Gama CMA               Summary   Chief Complaint :   Verbal consent given for telephone visit. Fatigue, runny nose/congestion, ST since this AM.    Anum Gama CMA - 11/18/2021 9:24 AM CST   Health Status   Allergies Verified? :   Yes   Medication History Verified? :   Yes   Pre-Visit Planning Status :   Not completed   Anum Gama CMA - 11/18/2021 9:24 AM CST   Meds / Allergies   (As Of: 11/18/2021 9:25:57 AM CST)   Allergies (Active)   Metal  Estimated Onset Date:   Unspecified ; Created By:   Leny Cherry; Reaction Status:   Active ; Category:   Other ; Substance:   Metal ; Type:   Allergy ; Updated By:   Leny Cherry; Reviewed Date:   7/28/2021 1:38 PM CDT      No Known Medication Allergies  Estimated Onset Date:   Unspecified ; Created By:   Rere Case; Reaction Status:   Active ; Category:   Drug ; Substance:   No Known Medication Allergies ; Type:   Allergy ; Updated By:   Rere Case; Reviewed Date:   7/28/2021 1:38 PM CDT        Medication List   (As Of: 11/18/2021 9:25:57 AM CST)   Prescription/Discharge Order    amLODIPine  :   amLODIPine ; Status:   Prescribed ; Ordered As Mnemonic:   amLODIPine 5 mg oral tablet ; Simple Display Line:   1 tab(s), Oral, daily, 14 tab(s), 0 Refill(s) ; Ordering Provider:   Augustin Chatman MD; Catalog Code:   amLODIPine ; Order Dt/Tm:   11/9/2021 8:44:36 AM CST          atorvastatin  :   atorvastatin ; Status:   Prescribed ; Ordered As Mnemonic:   atorvastatin 40 mg oral tablet ; Simple Display Line:   1 tab(s), Oral, daily, 14 tab(s), 0 Refill(s) ; Ordering Provider:   Augustin Chatman MD; Catalog Code:   atorvastatin ; Order Dt/Tm:   11/9/2021 8:44:58 AM CST          celecoxib  :   celecoxib ; Status:   Prescribed ; Ordered As Mnemonic:   celecoxib 100 mg oral capsule ; Simple Display Line:   1 cap(s), Oral, bid, 30 cap(s), 0 Refill(s) ;  Ordering Provider:   Augustin Chatman MD; Catalog Code:   celecoxib ; Order Dt/Tm:   11/9/2021 8:45:22 AM CST          gabapentin  :   gabapentin ; Status:   Prescribed ; Ordered As Mnemonic:   gabapentin 300 mg oral capsule ; Simple Display Line:   1 cap(s), Oral, tid, 270 cap(s), 1 Refill(s) ; Ordering Provider:   Augustin Chatman MD; Catalog Code:   gabapentin ; Order Dt/Tm:   12/8/2020 2:57:17 PM CST          tiZANidine  :   tiZANidine ; Status:   Completed ; Ordered As Mnemonic:   tiZANidine 4 mg oral tablet ; Simple Display Line:   2 tab(s), Oral, q8 hrs, PRN: AS NEEDED FOR MUSCLE PAIN, 60 tab(s), 0 Refill(s) ; Ordering Provider:   Augustin Chatman MD; Catalog Code:   tiZANidine ; Order Dt/Tm:   8/11/2021 2:24:47 PM CDT          traMADol  :   traMADol ; Status:   Completed ; Ordered As Mnemonic:   traMADol 50 mg oral tablet ; Simple Display Line:   50 mg, 1 tab(s), Oral, q8 hrs, PRN: headache, 12 tab(s), 0 Refill(s) ; Ordering Provider:   Augustin Chatman MD; Catalog Code:   traMADol ; Order Dt/Tm:   9/9/2021 9:09:18 AM CDT            Home Meds    aspirin  :   aspirin ; Status:   Completed ; Ordered As Mnemonic:   aspirin 81 mg oral tablet ; Simple Display Line:   81 mg, 1 tab(s), PO, Daily, 30 tab(s), 0 Refill(s) ; Catalog Code:   aspirin ; Order Dt/Tm:   7/12/2018 8:41:06 AM CDT          omega-3 polyunsaturated fatty acids  :   omega-3 polyunsaturated fatty acids ; Status:   Documented ; Ordered As Mnemonic:   Fish Oil 1000 mg oral capsule ; Simple Display Line:   1,000 mg, 1 cap(s), Oral, daily, 0 Refill(s) ; Catalog Code:   omega-3 polyunsaturated fatty acids ; Order Dt/Tm:   9/25/2018 5:25:20 PM CDT

## 2022-02-16 NOTE — TELEPHONE ENCOUNTER
Entered by Meredith Kaminski LPN on November 09, 2021 8:45:46 AM CST  ---------------------  From: Meredith Kaminski LPN   To: [facility] #[id number]    Sent: 11/9/2021 8:45:46 AM CST  Subject: Medication Management     ** Submitted: **  Order:celecoxib (celecoxib 100 mg oral capsule)  1 cap(s)  Oral  bid  Qty:  30 cap(s)        Refills:  0          Substitutions Allowed     [id number]    Signed by Meredith Kaminski LPN  11/9/2021 2:45:00 PM UT    ** Submitted: **  Complete:celecoxib (celecoxib 100 mg oral capsule)   Signed by Meredith Kaminski LPN  11/9/2021 2:45:00 PM UT    ** Not Approved:  **  celecoxib (CELECOXIB 100MG CAPSULES)  TAKE 1 CAPSULE BY MOUTH TWICE DAILY  Qty:  60 cap(s)        Days Supply:  30        Refills:  0          Substitutions Allowed     [facility] #[id number]   Signed by Meredith Kaminski LPN            ** Submitted: **  Order:atorvastatin (atorvastatin 40 mg oral tablet)  1 tab(s)  Oral  daily  Qty:  14 tab(s)        Refills:  0          Substitutions Allowed     Route To [facility]acy - [facility] #[id number]    Signed by Meredith Kaminski LPN  11/9/2021 2:44:00 PM UT    ** Submitted: **  Complete:atorvastatin (atorvastatin 40 mg oral tablet)   Signed by Meredith Kaminski LPN  11/9/2021 2:45:00 PM UT    ** Not Approved:  **  atorvastatin (ATORVASTATIN 40MG TABLETS)  TAKE 1 TABLET BY MOUTH DAILY  Qty:  30 tab(s)        Days Supply:  30        Refills:  0          Substitutions Allowed     Route To Pharmacy - [facility] #[id number]   Signed by Meredith Kaminski LPN            ** Submitted: **  Order:amLODIPine (amLODIPine 5 mg oral tablet)  1 tab(s)  Oral  daily  Qty:  14 tab(s)        Refills:  0          Substitutions Allowed     [facility] - [facility] #[id number]    Signed by Meredith Kaminski LPN  11/9/2021 2:44:00 PM UT    ** Submitted: **  Complete:amLODIPine (amLODIPine 5 mg oral tablet)   Signed by Meredith Kaminski LPN  11/9/2021 2:44:00 PM  [facility]    ** Not Approved:  **  amLODIPine (AMLODIPINE BESYLATE 5MG TABLETS)  TAKE 1 TABLET BY MOUTH EVERY DAY  Qty:  30 tab(s)        Days Supply:  30        Refills:  0          Substitutions Allowed     Route To Pharmacy - [facility] #[id number]   Signed by Meredith Kaminski LPN            ------------------------------------------  From: [facility] #[id number]  To: Augustin Chatman MD  Sent: November 2, 2021 5:49:19 PM CDT  Subject: Medication Management  Due: October 21, 2021 8:18:04 PM CDT     ** On Hold Pending Signature **     Dispensed Drug: amLODIPine (amLODIPine 5 mg oral tablet), TAKE 1 TABLET BY MOUTH EVERY DAY  Quantity: 30 tab(s)  Days Supply: 30  Refills: 0  Substitutions Allowed  Notes from Pharmacy:     ** On Hold Pending Signature **     Dispensed Drug: atorvastatin (atorvastatin 40 mg oral tablet), TAKE 1 TABLET BY MOUTH DAILY  Quantity: 30 tab(s)  Days Supply: 30  Refills: 0  Substitutions Allowed  Notes from Pharmacy:     ** On Hold Pending Signature **     Dispensed Drug: celecoxib (celecoxib 100 mg oral capsule), TAKE 1 CAPSULE BY MOUTH TWICE DAILY  Quantity: 60 cap(s)  Days Supply: 30  Refills: 0  Substitutions Allowed  Notes from Pharmacy:  ------------------------------------------2 week supply sent since pt already received 30 day protocol.  LM for pt letting her know small supply of medication sent and that she needs to schedule appt for further refills.

## 2022-02-16 NOTE — NURSING NOTE
Comprehensive Intake Entered On:  1/15/2019 4:47 PM CST    Performed On:  1/15/2019 4:45 PM CST by Rere Case               Summary   Chief Complaint :   right side upper abdominal pain.  HTN check   Weight Measured :   169.6 lb(Converted to: 169 lb 10 oz, 76.93 kg)    Systolic Blood Pressure :   123 mmHg   Diastolic Blood Pressure :   85 mmHg (HI)    Mean Arterial Pressure :   98 mmHg   Peripheral Pulse Rate :   75 bpm   BP Method :   Electronic   HR Method :   Electronic   Temperature Tympanic :   97.3 DegF(Converted to: 36.3 DegC)  (LOW)    Rere Case - 1/15/2019 4:45 PM CST   Health Status   Allergies Verified? :   Yes   Medication History Verified? :   Yes   Pre-Visit Planning Status :   Completed   Tobacco Use? :   Never smoker   Rere Case - 1/15/2019 4:45 PM CST   Meds / Allergies   (As Of: 1/15/2019 4:47:34 PM CST)   Allergies (Active)   Metal  Estimated Onset Date:   Unspecified ; Created By:   Leny Cherry; Reaction Status:   Active ; Category:   Other ; Substance:   Metal ; Type:   Allergy ; Updated By:   Leny Cherry; Reviewed Date:   10/1/2018 11:43 AM CDT      No Known Medication Allergies  Estimated Onset Date:   Unspecified ; Created By:   Rere Case; Reaction Status:   Active ; Category:   Drug ; Substance:   No Known Medication Allergies ; Type:   Allergy ; Updated By:   Rere Case; Reviewed Date:   10/1/2018 11:43 AM CDT        Medication List   (As Of: 1/15/2019 4:47:34 PM CST)   Prescription/Discharge Order    atorvastatin  :   atorvastatin ; Status:   Prescribed ; Ordered As Mnemonic:   atorvastatin 40 mg oral tablet ; Simple Display Line:   1 tab(s), Oral, daily, 30 tab(s), 0 Refill(s) ; Ordering Provider:   Augustin Chatman MD; Catalog Code:   atorvastatin ; Order Dt/Tm:   1/15/2019 3:44:43 PM          amLODIPine  :   amLODIPine ; Status:   Prescribed ; Ordered As Mnemonic:   amLODIPine 2.5 mg oral tablet ; Simple Display Line:   1 tab(s), Oral, daily, 30  tab(s), 0 Refill(s) ; Ordering Provider:   Augustin Chatman MD; Catalog Code:   amLODIPine ; Order Dt/Tm:   1/15/2019 3:43:29 PM            Home Meds    omega-3 polyunsaturated fatty acids  :   omega-3 polyunsaturated fatty acids ; Status:   Documented ; Ordered As Mnemonic:   Fish Oil 1000 mg oral capsule ; Simple Display Line:   1,000 mg, 1 cap(s), Oral, daily, 0 Refill(s) ; Catalog Code:   omega-3 polyunsaturated fatty acids ; Order Dt/Tm:   9/25/2018 5:25:20 PM          aspirin  :   aspirin ; Status:   Documented ; Ordered As Mnemonic:   aspirin 81 mg oral tablet ; Simple Display Line:   81 mg, 1 tab(s), PO, Daily, 30 tab(s), 0 Refill(s) ; Catalog Code:   aspirin ; Order Dt/Tm:   7/12/2018 8:41:06 AM

## 2022-02-16 NOTE — LETTER
(Inserted Image. Unable to display)   May 20, 2020      YESSENIA RIDDLE      [address] [place], [place] [id number]        Dear YESSENIA,    Thank you for selecting [facility] for your healthcare needs.  Below you will find the results of the recent tests done at our clinic.     All labs acceptable.      Result Name Current Result Previous Result Reference Range   Potassium Level (mmol/L)  4.0 5/19/2020  4.4 3/7/2019 3.5 - 5.3   Glucose Level (mg/dL)  84 5/19/2020  89 3/7/2019 65 - 99   Creatinine Level (mg/dL)  0.70 5/19/2020  0.78 3/7/2019 0.50 - 1.10       Please contact me or my assistant at [phone] if you have any questions.     Sincerely,        Augustin Chatman MD        What do your labs mean?  Below is a glossary of commonly ordered labs:  LDL   Bad Cholesterol   HDL   Good Cholesterol  AST/ALT   Liver Function   Cr/Creatinine   Kidney Function  Microalbumin   Kidney Function  BUN   Kidney Function  PSA   Prostate    TSH   Thyroid Hormone  HgbA1c   Diabetes Test   Hgb (Hemoglobin)   Red Blood Cells

## 2022-02-16 NOTE — NURSING NOTE
Comprehensive Intake Entered On:  2/22/2020 12:36 PM CST    Performed On:  2/22/2020 12:33 PM CST by Rere Case               Summary   Chief Complaint :   f/up neck/back/shoulder pain   Weight Measured :   136.2 lb(Converted to: 136 lb 3 oz, 61.78 kg)    Height Measured :   63 in(Converted to: 5 ft 3 in, 160.02 cm)    Body Mass Index :   24.12 kg/m2   Body Surface Area :   1.66 m2   Systolic Blood Pressure :   134 mmHg (HI)    Diastolic Blood Pressure :   70 mmHg   Mean Arterial Pressure :   91 mmHg   Peripheral Pulse Rate :   82 bpm   Temperature Tympanic :   97.7 DegF(Converted to: 36.5 DegC)  (LOW)    Oxygen Saturation :   100 %   Rere Case - 2/22/2020 12:33 PM CST

## 2022-02-16 NOTE — NURSING NOTE
Comprehensive Intake Entered On:  12/8/2020 2:17 PM CST    Performed On:  12/8/2020 2:15 PM CST by Rere Case               Summary   Chief Complaint :   Chronic disease visit. Verbal consent given for video visit.  (will do lipid with next BMP in May)   Height Measured :   63 in(Converted to: 5 ft 3 in, 160.02 cm)    Yanet eRre LOVELL - 12/8/2020 2:15 PM CST   Health Status   Allergies Verified? :   Yes   Medication History Verified? :   Yes   Pre-Visit Planning Status :   Completed   Tobacco Use? :   Former smoker   Rere Case - 12/8/2020 2:15 PM CST   ID Risk Screen   Recent Travel History :   No recent travel   Family Member Travel History :   No recent travel   Other Exposure to Infectious Disease :   Unknown   Rere Case - 12/8/2020 2:15 PM CST   Social History   Social History   (As Of: 12/8/2020 2:17:39 PM CST)   Alcohol:        1 drink every 3 months or more.   (Last Updated: 11/24/2017 11:00:46 AM CST by Onelia Espino)          Tobacco:        Former smoker, quit more than 30 days ago, Stopped age About 33 Years.   (Last Updated: 12/8/2020 2:16:03 PM CST by Rere Case)          Electronic Cigarette/Vaping:        Electronic Cigarette Use: Never.   (Last Updated: 12/8/2020 2:16:08 PM CST by Rere Case)          Employment/School:        Employed, Work/School description: CMA.   (Last Updated: 11/24/2017 10:50:50 AM CST by Onelia Espino)   Employed, Work/School description: CMA.   (Last Updated: 11/24/2017 10:55:17 AM CST by Onelia Espino)          Exercise:        Exercise frequency: 2-3 x per wk..   (Last Updated: 11/24/2017 11:01:10 AM CST by Onelia Espino)

## 2022-02-16 NOTE — LETTER
(Inserted Image. Unable to display)   March 12, 2019      YESSENIA RIDDLE  [address] [place], [place] [id number]        Dear YESSENIA,      Thank you for selecting [facility] for your healthcare needs.      Your recent lab result was normal.  No pinworms found      Result Name Current Result   Pinworm Prep  See comment 3/7/2019       Please contact me or my assistant at [phone] if you have any questions or concerns.     Sincerely,        Augustin Chatman MD

## 2022-02-16 NOTE — NURSING NOTE
Comprehensive Intake Entered On:  3/14/2019 5:07 PM CDT    Performed On:  3/14/2019 5:00 PM CDT by Leny Mar CMA               Summary   Chief Complaint :   consult per TFS - c/o weight loss- no appetite, N/V, inconsistent bowels-blood in stool/mucous, fatigue, RUQ pains - negative GB u/s.  Has been eval by [facility]- f/u appt 3/26   Weight Measured :   163.2 lb(Converted to: 163 lb 3 oz, 74.03 kg)    Systolic Blood Pressure :   130 mmHg   Diastolic Blood Pressure :   78 mmHg   Mean Arterial Pressure :   95 mmHg   Peripheral Pulse Rate :   72 bpm   Temperature Tympanic :   97.8 DegF(Converted to: 36.6 DegC)  (LOW)    Leny Mar CMA - 3/14/2019 5:00 PM CDT   Health Status   Allergies Verified? :   Yes   Medication History Verified? :   Yes   Medical History Verified? :   Yes   Pre-Visit Planning Status :   Completed   Tobacco Use? :   Former smoker   Leny Mar CMA - 3/14/2019 5:00 PM CDT   Meds / Allergies   (As Of: 3/14/2019 5:07:19 PM CDT)   Allergies (Active)   Metal  Estimated Onset Date:   Unspecified ; Created By:   Leny Cherry; Reaction Status:   Active ; Category:   Other ; Substance:   Metal ; Type:   Allergy ; Updated By:   Leny Cherry; Reviewed Date:   1/15/2019 6:43 PM CST      No Known Medication Allergies  Estimated Onset Date:   Unspecified ; Created By:   Rere Case; Reaction Status:   Active ; Category:   Drug ; Substance:   No Known Medication Allergies ; Type:   Allergy ; Updated By:   Rere Case; Reviewed Date:   1/15/2019 6:43 PM CST        Medication List   (As Of: 3/14/2019 5:07:19 PM CDT)   Prescription/Discharge Order    albendazole  :   albendazole ; Status:   Processing ; Ordered As Mnemonic:   albendazole 200 mg oral tablet ; Ordering Provider:   Augustin Chatman MD; Action Display:   Complete ; Catalog Code:   albendazole ; Order Dt/Tm:   3/14/2019 5:06:12 PM          amLODIPine  :   amLODIPine ; Status:   Prescribed ; Ordered As Mnemonic:   amLODIPine 2.5 mg oral  tablet ; Simple Display Line:   1 tab(s), Oral, daily, 90 tab(s), 1 Refill(s) ; Ordering Provider:   Augustin Chatman MD; Catalog Code:   amLODIPine ; Order Dt/Tm:   3/7/2019 9:53:34 AM          atorvastatin  :   atorvastatin ; Status:   Prescribed ; Ordered As Mnemonic:   atorvastatin 40 mg oral tablet ; Simple Display Line:   1 tab(s), Oral, daily, 90 tab(s), 1 Refill(s) ; Ordering Provider:   Augustin Chatman MD; Catalog Code:   atorvastatin ; Order Dt/Tm:   3/7/2019 9:53:35 AM          omeprazole  :   omeprazole ; Status:   Prescribed ; Ordered As Mnemonic:   omeprazole 20 mg oral delayed release capsule ; Simple Display Line:   1 cap(s), Oral, bid, 180 cap(s), 1 Refill(s) ; Ordering Provider:   Augustin Chatman MD; Catalog Code:   omeprazole ; Order Dt/Tm:   3/7/2019 9:53:33 AM          ondansetron  :   ondansetron ; Status:   Prescribed ; Ordered As Mnemonic:   ondansetron 8 mg oral tablet, disintegrating ; Simple Display Line:   1 tab(s), Oral, q8 hrs, PRN: AS NEEDED FOR NAUSEA OR VOMITING, 12 tab(s), 0 Refill(s) ; Ordering Provider:   Augustin Chatman MD; Catalog Code:   ondansetron ; Order Dt/Tm:   1/29/2019 3:25:27 PM            Home Meds    aspirin  :   aspirin ; Status:   Documented ; Ordered As Mnemonic:   aspirin 81 mg oral tablet ; Simple Display Line:   81 mg, 1 tab(s), PO, Daily, 30 tab(s), 0 Refill(s) ; Catalog Code:   aspirin ; Order Dt/Tm:   7/12/2018 8:41:06 AM          omega-3 polyunsaturated fatty acids  :   omega-3 polyunsaturated fatty acids ; Status:   Documented ; Ordered As Mnemonic:   Fish Oil 1000 mg oral capsule ; Simple Display Line:   1,000 mg, 1 cap(s), Oral, daily, 0 Refill(s) ; Catalog Code:   omega-3 polyunsaturated fatty acids ; Order Dt/Tm:   9/25/2018 5:25:20 PM            Social History   Social History   (As Of: 3/14/2019 5:07:19 PM CDT)   Alcohol:        1 drink every 3 months or more.   (Last Updated: 11/24/2017 11:00:46 AM CST by Onelia Espino)          Tobacco:         Former smoker, quit more than 30 days ago, Cigarettes, Started age 13 Years.  Stopped age 33 Years.   (Last Updated: 11/22/2017 2:19:46 PM CST by Rere Case)          Employment and Education:        Employed, Work/School description: CMA.   (Last Updated: 11/24/2017 10:50:50 AM CST by Onelia Espino)   Employed, Work/School description: [employer].   (Last Updated: 11/24/2017 10:55:17 AM CST by Onelia Espino)          Exercise and Physical Activity:        Exercise frequency: 2-3 x per wk..   (Last Updated: 11/24/2017 11:01:10 AM CST by Onelia Espino)

## 2022-02-16 NOTE — TELEPHONE ENCOUNTER
---------------------  From: Augustin Chatman MD   To: Rere Case;     Sent: 1/25/2019 7:49:53 AM CST  Subject: General Message     Let her know hida scan was normal  if her symptoms come back or are not resolved needs referral to GI for upper endoscopy---------------------  From: Rere Case   To: [facility] Message Pool ([id number]);     Sent: 1/25/2019 8:06:50 AM CST  Subject: FW: General Message     Left message for patient to call back at: 8:06amPatient called back at 1049.  She is returning call from Friday.  She is still having symptoms and is not feeling any better.  She is still not eating.  She would like to go ahead and get referral to GI for upper endoscopy.  She has a GI place in mind that a friend of hers told her to go to.  She would like to talk to referrals about sending information to them.  She is not sure the name of the place but is contacting friend so when they call her she will discuss this with them.  She is also requesting refill of Zofran.  Please advise.---------------------  From: Rere Case ([facility] Message Pool ([id number]))   To: Augustin Chatman MD;     Sent: 1/29/2019 12:27:49 PM CST  Subject: FW: General Message---------------------  From: Augustin Chatman MD   To: [facility] Message Pool ([id number]);     Sent: 1/29/2019 1:43:02 PM CST  Subject: RE: General Message     ok for gi referral---------------------  From: Rere Case ([facility] Message Pool ([id number]))   To: Augustin Chatman MD;     Sent: 1/29/2019 1:54:06 PM CST  Subject: FW: General Message     referral placed. Okay for refill of zofran?---------------------  From: Augustin Chatman MD   To: [facility] Message Pool ([id number]);     Sent: 1/29/2019 1:56:21 PM CST  Subject: RE: General Message     ok to refill same # and directionsRx sent.

## 2022-02-16 NOTE — PROGRESS NOTES
Patient:   YESSENIA RIDDLE            MRN: [id number]            FIN: [id number]               Age:   43 years     Sex:  Female     :  1975   Associated Diagnoses:   Abdominal pain in female   Author:   Augustin Chatman MD      Visit Information      Date of Service: 2018 05:20 pm  Performing Location: [facility]  Encounter#: [id number]      Primary Care Provider (PCP):  DADA JAMES    NPI# [id number]      Referring Provider:  Augustin Chatman MD    NPI# [id number]      Chief Complaint   2018 5:22 PM CDT    Right side pain x1 week.  Yesterday nausea and vomiting.        History of Present Illness   chief complaint and symptoms as noted above confirmed with patient   RUQ pain  Decreased appetite  1 emesis yesterday  no fever  No stool change or gu co  worse 30min after food      Review of Systems   Constitutional:  Negative except as documented in history of present illness.    Ear/Nose/Mouth/Throat:  Negative.    Respiratory:  Negative.    Cardiovascular:  Negative.    Gastrointestinal:  Negative except as documented in history of present illness.    Genitourinary:  Negative.    Musculoskeletal:  Negative.    Integumentary:  Negative.    Neurologic:  Negative.       Health Status   Allergies:    Allergic Reactions (Selected)  Severity Not Documented  Metal (No reactions were documented)  No Known Medication Allergies   Medications:  (Selected)   Prescriptions  Prescribed  Zofran ODT 4 mg oral tablet, disintegrating: = 1 tab(s) ( 4 mg ), Oral, tid, PRN: for nausea/vomiting, # 10 EA, 0 Refill(s), Type: Maintenance, Pharmacy: [id number], 1 tab(s) Oral tid,PRN:for nausea/vomiting  amLODIPine 2.5 mg oral tablet: 1 tab(s) ( 2.5 mg ), po, daily, # 90 tab(s), 1 Refill(s), Type: Maintenance, Pharmacy: [id number], 1 tab(s) Oral daily  atorvastatin 40 mg oral tablet: 1 tab(s) ( 40 mg ), po, daily, # 90 tab(s), 1 Refill(s), Type: Maintenance, Pharmacy:  [facility] [id number], 1 tab(s) Oral daily  traMADol 50 mg oral tablet: = 1 tab(s) ( 50 mg ), PO, q4hr, PRN: for pain, # 10 tab(s), 0 Refill(s), Type: Maintenance  Documented Medications  Documented  Fish Oil 1000 mg oral capsule: = 1 cap(s) ( 1,000 mg ), Oral, daily, 0 Refill(s), Type: Maintenance  Flonase 50 mcg/inh nasal spray: 1 spray(s), nasal, daily, 0 Refill(s), Type: Maintenance  Xyzal 5 mg oral tablet: 1 tab(s) ( 5 mg ), po, qpm, 0 Refill(s), Type: Maintenance  aspirin 81 mg oral tablet: 1 tab(s) ( 81 mg ), PO, Daily, # 30 tab(s), 0 Refill(s), Type: Maintenance,    Medications          *denotes recorded medication          amLODIPine 2.5 mg oral tablet: 2.5 mg, 1 tab(s), po, daily, 90 tab(s), 1 Refill(s).          *aspirin 81 mg oral tablet: 81 mg, 1 tab(s), PO, Daily, 30 tab(s), 0 Refill(s).          atorvastatin 40 mg oral tablet: 40 mg, 1 tab(s), po, daily, 90 tab(s), 1 Refill(s).          *Flonase 50 mcg/inh nasal spray: 1 spray(s), nasal, daily, 0 Refill(s).          *Xyzal 5 mg oral tablet: 5 mg, 1 tab(s), po, qpm, 0 Refill(s).          *Fish Oil 1000 mg oral capsule: 1,000 mg, 1 cap(s), Oral, daily, 0 Refill(s).          Zofran ODT 4 mg oral tablet, disintegratin mg, 1 tab(s), Oral, tid, PRN: for nausea/vomiting, 10 EA, 0 Refill(s).          traMADol 50 mg oral tablet: 50 mg, 1 tab(s), PO, q4hr, PRN: for pain, 10 tab(s), 0 Refill(s).     Problem list:    All Problems (Selected)  Allergic rhinitis, unspecified / SNOMED CT 098474286 / Confirmed  Pain in joint involving lower leg / SNOMED CT 613193840 / Confirmed  Back problem / SNOMED CT 827628521 / Confirmed  Gastrocnemius equinus / SNOMED CT 9075584673 / Confirmed  Disorder of sacrum / SNOMED CT 80908049 / Confirmed  Sleep disturbance / SNOMED CT 59149634 / Confirmed  Dyslipidemia / SNOMED CT 4763656597 / Confirmed  Facet syndrome / SNOMED CT 968271570 / Confirmed  Family history of heart attack / SNOMED CT 256990098 / Confirmed  Hip pain /  SNOMED CT 67925804 / Confirmed  Hyperlipidemia, unspecified / SNOMED CT 38200045 / Confirmed  Lipidemia / SNOMED CT 30843083 / Confirmed  Hypertension goal BP (blood pressure) < 130/80 / SNOMED CT 4145341099 / Confirmed  Knee pain / SNOMED CT 40859417 / Confirmed  Lumbago / SNOMED CT 924534319 / Confirmed  Lumbosacral neuritis / SNOMED CT 073616992 / Confirmed  Lumbosacral spondylosis / SNOMED CT 295696519 / Confirmed  Lumbosacral spondylosis without myelopathy / SNOMED CT 95785730 / Confirmed  Myalgia / SNOMED CT 389317325 / Confirmed  Myofascial pain / SNOMED CT 032308964 / Confirmed  Neuritis / SNOMED CT 782058597 / Confirmed  Nonspecific reaction to tuberculin skin test without active tuberculosis / SNOMED CT 0661071373 / Confirmed  Obesity / SNOMED CT 5928400516 / Probable  Pain in limb / SNOMED CT 471721730 / Confirmed  Leg pain / SNOMED CT 552219198 / Confirmed  Patellar tendinitis / SNOMED CT 14178670 / Confirmed  Patellofemoral syndrome / SNOMED CT 1868271760 / Confirmed  Pes anserinus bursitis / SNOMED CT 915670201 / Confirmed  Plantar fasciitis / SNOMED CT 284521527 / Confirmed  Disturbance of skin sensation / SNOMED CT 581575434 / Confirmed  Uterine prolapse / SNOMED CT 26002444 / Confirmed      Histories   Past Medical History:    Active  Hyperlipidemia, unspecified (83496890): Onset on 7/18/2013 at 38 years.  Disorder of sacrum (88522798): Onset on 1/24/2012 at 36 years.  Lumbosacral spondylosis without myelopathy (90713991): Onset on 1/3/2012 at 36 years.  Back problem (187391727): Onset on 1/3/2012 at 36 years.  Sleep disturbance (74028447): Onset on 6/27/2011 at 36 years.  Myalgia (768708949): Onset on 6/27/2011 at 36 years.  Pain in joint involving lower leg (098005918): Onset on 1/21/2011 at 35 years.  Neuritis (384500343): Onset on 12/3/2010 at 35 years.  Comments:  12/14/2017 CST 9:40 AM CST - Leny Cherry  Thoracic or lumbosacral  Disturbance of skin sensation (144384871): Onset on 11/23/2010 at  35 years.  Lumbago (209975074): Onset on 2010 at 35 years.  Pain in limb (338050325): Onset on 2010 at 35 years.  Nonspecific reaction to tuberculin skin test without active tuberculosis (9874344725): Onset on 2010 at 35 years.  Allergic rhinitis, unspecified (820456549)  Patellar tendinitis (16663480)  Pes anserinus bursitis (961516573)  Knee pain (85877015)  Hip pain (32516983)  Facet syndrome (389583364)  Comments:  2017 CST 9:50 AM CST - Leny Cherry  Lumbar  Lumbosacral spondylosis (704884647)  Leg pain (230594386)  Patellofemoral syndrome (7271297046)  Gastrocnemius equinus (1457944854)  Dyslipidemia (1517032168)  Lumbosacral neuritis (356668815)  Myofascial pain (912033579)  Plantar fasciitis (616968780)  Uterine prolapse (11790714)  Resolved  Chronic sinusitis, unspecified (17523343): Onset on 2010 at 35 years.  Resolved.  Other Disorders of Menstruation and Other Abnormal Bleeding from Female Genital Tract (626.8):  Resolved.  Uterine Prolapse without Mention of Vaginal Wall Prolapse (618.1):  Resolved.  Pregnancy:  Resolved.  Tobacco Use Disorder (305.1):  Resolved.  Pilonidal cyst with abscess (312181440):  Resolved.  Pregnancy (622279446):  Resolved in  at 28 years.  Pregnancy (601856824):  Resolved in  at 29 years.  History of tobacco use (8830941451):  Resolved.  Dysfunctional uterine bleeding (04148347):  Resolved.  Pilonidal cyst with abscess (172997858):  Resolved.   Family History:    Cardiac pacemaker  Father (Jacobo)  Comments:  2017 10:17 AM - Leny Cherry  Irregular heart beat  Diabetes mellitus  Mother (Bianka, )  Heart disease  Father (Jacobo)  Comments:  2017 10:38 AM - Leny Cherry  Bypass surgery at the age of 40.    2017 10:16 AM - Leny Cherry  Irregular heart beat  Grandfather (P)  Comments:  2017 10:35 AM - Leny Cherry  Heart attack at the age of 50.  Grandmother (P) ()  Comments:  2017 10:35 RONAK Cherry  Leny  Heart attack at the age of 30.  Mother (Bianka, )  Comments:  2017 10:16 AM - Leny Cherry  Bypass graft  High blood pressure  Father (Jacobo)  Mother (Bianka, )  CA - Cancer  Mother (Bianka, )  MS - Multiple sclerosis  Sister  Mother (Bianka, )  Aunt (M)  Colon Polyps  Mother (Bianka, )  Kidney Disease  Mother (Bianka, )  Comments:  2017 10:37 AM - Leny Cherry  Kidney failure  Colon cancer  Grandfather (M) ()  Great Uncle (P) ()  Great Grandfather (M)     Procedure history:    Cardiac computed tomography for calcium scoring (SNOMED CT 9831119237) on 2017 at 42 Years.  Comments:  2017 1:58 PM - Shantel Pizano  Total Agatston calcium score is 8.  THR - Total hip replacement (SNOMED CT 368549920) in  at 39 Years.  Comments:  2017 10:58 AM - Onelia Espino  left  Pilonidal cyst (SNOMED CT 8042589795) on 2012 at 37 Years.  Comments:  2017 10:41 AM - Leny Cherry  Excision.  No abscess.  Nerve conduction study (SNOMED CT 77960494) on 2010 at 35 Years.  Cystoscopy (SNOMED CT 69268743) on 2009 at 34 Years.  Hysterectomy (SNOMED CT 773694582) on 2007 at 32 Years.  Comments:  2017 10:44 AM - Leny Cherry  Right salpingo-oophorectomy.   Social History:        Alcohol Assessment            1 drink every 3 months or more.      Tobacco Assessment            Former smoker, quit more than 30 days ago, Cigarettes, Started age 13 Years.  Stopped age 33 Years.      Employment and Education Assessment            Employed, Work/School description: [employer].            Employed, Work/School description: [employer].      Exercise and Physical Activity Assessment            Exercise frequency: 2-3 x per wk..        Physical Examination   Vital Signs   2018 5:22 PM CDT Temperature Tympanic 97.3 DegF  LOW    Peripheral Pulse Rate 74 bpm    HR Method Electronic    Systolic Blood Pressure 158 mmHg  HI    Diastolic Blood  Pressure 104 mmHg  HI    Mean Arterial Pressure 122 mmHg    BP Method Electronic      Measurements from flowsheet : Measurements   9/25/2018 5:22 PM CDT    Weight Measured - Standard                191.6 lb     General:  Alert and oriented, No acute distress.    HENT:  Oral mucosa is moist.    Neck:  Supple, Non-tender, No lymphadenopathy.    Respiratory:  Lungs are clear to auscultation, Respirations are non-labored.    Cardiovascular:  Normal rate, Regular rhythm.    Gastrointestinal:       Abdomen: Right, Upper quadrant, Tenderness, No guarding.    Genitourinary:  No costovertebral angle tenderness.    Integumentary:  No rash.    Neurologic:  Alert, Oriented.       Impression and Plan   Diagnosis     Abdominal pain in female (HVA84-EL R10.9).     Course:  Not progressing as expected.    Plan:  likely GB  Labs, U/S  zofran/tramadol  low fat.    Patient Instructions:       Counseled: Patient, Regarding diagnosis, Regarding treatment, Regarding medications, Diet, Activity.

## 2022-02-16 NOTE — PROGRESS NOTES
Patient:   YESSENIA RIDDLE            MRN: [id number]            FIN: [id number]               Age:   46 years     Sex:  Female     :  1975   Associated Diagnoses:   Benign meningioma   Author:   Augustin Chatman MD      Visit Information      Date of Service: 2021 12:45 pm  Performing Location: [facility]  Encounter#: [id number]      Primary Care Provider (PCP):  Augustin Chatman MD    NPI# [id number]      Referring Provider:  Augustin Chatman MD, NPI# [id number]      Chief Complaint   2021 12:54 PM CDT   MEDICATION CONSULT - RECENTLY DIAGNOSED WITH BRAIN TUMOR, Upcoming surgery, Pain is very bad.        History of Present Illness   [facility].  She was found to have a large meningioma on the left side.  She is seeing neurosurgery very soon with an upcoming surgery planned.  Apparently meningioma is pushing on optic nerve may be causing her neck problems.  She has noted some recent intermittent double vision on that side as well.  She would like something mild for the discomfort she is having at times.  No numbness tingling weakness of her extremities         Review of Systems   Constitutional:  Negative except as documented in history of present illness.    Eye:  Negative except as documented in history of present illness.    Respiratory:  Negative.    Cardiovascular:  Negative.    Musculoskeletal:  Negative except as documented in history of present illness.    Integumentary:  Negative.    Neurologic:  Negative except as documented in history of present illness.       Health Status   Allergies:    Allergic Reactions (Selected)  Severity Not Documented  Metal (No reactions were documented)  No Known Medication Allergies   Medications:  (Selected)   Prescriptions  Prescribed  amLODIPine 5 mg oral tablet: = 1 tab(s), Oral, daily, # 30 tab(s), 0 Refill(s), Type: Maintenance, Pharmacy: [facility] #[id number], 1 tab(s) Oral daily, 63,  in, 02/24/21 14:00:00 CST, Height Measured, 130, lb, 02/24/21 14:00:00 CST, Weight Measured  atorvastatin 40 mg oral tablet: = 1 tab(s), Oral, daily, # 30 tab(s), 0 Refill(s), Type: Maintenance, Pharmacy: [facility] #[id number], Appt due for further refills, 1 tab(s) Oral daily, 63, in, 02/24/21 14:00:00 CST, Height Measured, 130, lb, 02/24/21 14:00:00 CST, Weight Patience...  celecoxib 100 mg oral capsule: = 1 cap(s), Oral, bid, # 60 cap(s), 0 Refill(s), Type: Maintenance, Pharmacy: [facility] #[id number], 1 cap(s) Oral bid, 63, in, 02/24/21 14:00:00 CST, Height Measured, 130, lb, 02/24/21 14:00:00 CST, Weight Measured  gabapentin 300 mg oral capsule: = 1 cap(s), Oral, tid, # 270 cap(s), 1 Refill(s), Type: Maintenance, Pharmacy: [facility] #[id number], 1 cap(s) Oral tid, 63, in, 12/08/20 14:15:00 CST, Height Measured, 133.6, lb, 09/30/20 16:06:00 CDT, Weight Measured  tiZANidine 4 mg oral tablet: = 2 tab(s), Oral, q8 hrs, PRN: AS NEEDED FOR MUSCLE PAIN, # 60 tab(s), 0 Refill(s), Type: Maintenance, Pharmacy: [facility] #[id number], TAKE 2 TABLETS BY MOUTH EVERY 8 HOURS AS NEEDED FOR MUSCLE PAIN, 63, in, 02/24/21 14:00:00 CST, Height Measur...  traMADol 50 mg oral tablet: = 1 tab(s) ( 50 mg ), Oral, q8 hrs, PRN: headache, # 30 tab(s), 0 Refill(s), Type: Maintenance, Pharmacy: [facility] #[id number], 1 tab(s) Oral q8 hrs,PRN:headache, 63, in, 07/28/21 12:54:00 CDT, Height Measured, 123, lb, 07/28/21 12:54:00 CDT, W...  Documented Medications  Documented  Fish Oil 1000 mg oral capsule: = 1 cap(s) ( 1,000 mg ), Oral, daily, 0 Refill(s), Type: Maintenance  aspirin 81 mg oral tablet: 1 tab(s) ( 81 mg ), PO, Daily, # 30 tab(s), 0 Refill(s), Type: Maintenance   Problem list:    All Problems (Selected)  Hyperlipidemia, unspecified / SNOMED CT 27435161 / Confirmed  Sleep disturbance / SNOMED CT 05390593 / Confirmed  Lumbosacral spondylosis without myelopathy / SNOMED CT 80092527 / Confirmed  Myofascial  pain / SNOMED CT 076685173 / Confirmed  Family history of heart attack / SNOMED CT 161538716 / Confirmed  Abdominal pain / SNOMED CT 88179349 / Confirmed  Gastrocnemius equinus / SNOMED CT 6982685726 / Confirmed  Nonspecific reaction to tuberculin skin test without active tuberculosis / SNOMED CT 1767630330 / Confirmed  GERD without esophagitis / SNOMED CT 5149485376 / Confirmed  Obesity / SNOMED CT 9232428535 / Probable  Hypertension goal BP (blood pressure) < 130/80 / SNOMED CT 0168813444 / Confirmed  Lumbosacral neuritis / SNOMED CT 448350143 / Confirmed  Allergic rhinitis, unspecified / SNOMED CT 685144276 / Confirmed      Histories   Past Medical History:    Active  Hyperlipidemia, unspecified (00179969): Onset on 7/18/2013 at 38 years.  Lumbosacral spondylosis without myelopathy (96772915): Onset on 1/3/2012 at 36 years.  Sleep disturbance (25114327): Onset on 6/27/2011 at 36 years.  Nonspecific reaction to tuberculin skin test without active tuberculosis (6454806858): Onset on 1/29/2010 at 35 years.  Allergic rhinitis, unspecified (704907351)  Gastrocnemius equinus (4858901336)  Lumbosacral neuritis (999263827)  Myofascial pain (293141701)  Resolved  Disorder of sacrum (53627397): Onset on 1/24/2012 at 36 years.  Resolved.  Back problem (600605340): Onset on 1/3/2012 at 36 years.  Resolved.  Myalgia (242136482): Onset on 6/27/2011 at 36 years.  Resolved.  Pain in joint involving lower leg (439883911): Onset on 1/21/2011 at 35 years.  Resolved.  Neuritis (294482116): Onset on 12/3/2010 at 35 years.  Resolved.  Comments:  12/14/2017 CST 9:40 AM CST - Leny Cherry  Thoracic or lumbosacral  Disturbance of skin sensation (089885934): Onset on 11/23/2010 at 35 years.  Resolved.  Lumbago (145521350): Onset on 9/22/2010 at 35 years.  Resolved.  Pain in limb (518905326): Onset on 9/7/2010 at 35 years.  Resolved.  Chronic sinusitis, unspecified (10790728): Onset on 2/16/2010 at 35 years.  Resolved.  Other Disorders of  Menstruation and Other Abnormal Bleeding from Female Genital Tract (626.8):  Resolved.  Uterine Prolapse without Mention of Vaginal Wall Prolapse (618.1):  Resolved.  Pregnancy:  Resolved.  Tobacco Use Disorder (305.1):  Resolved.  Patellar tendinitis (01159309):  Resolved.  Pes anserinus bursitis (030033402):  Resolved.  Pilonidal cyst with abscess (222357235):  Resolved.  Pregnancy (205285443):  Resolved in  at 28 years.  Pregnancy (920490537):  Resolved in  at 29 years.  Lipidemia (26589598):  Resolved.  Knee pain (27766353):  Resolved.  Hip pain (01996991):  Resolved on 3/7/2019 at 44 years.  Facet syndrome (668061997):  Resolved.  Comments:  2017 CST 9:50 AM Leny León  Lumbar  Lumbosacral spondylosis (785722899):  Resolved.  Leg pain (732688206):  Resolved.  Patellofemoral syndrome (4400228925):  Resolved.  Dyslipidemia (1788934005):  Resolved.  Plantar fasciitis (552163364):  Resolved.  History of tobacco use (0388011653):  Resolved.  Dysfunctional uterine bleeding (38763441):  Resolved.  Pilonidal cyst with abscess (513663404):  Resolved.  Uterine prolapse (02467604):  Resolved.  H. pylori infection (0893733990):  Resolved.   Family History:    Cardiac pacemaker  Father (Jacobo)  Comments:  2017 10:17 AM CST Leny Francisco  Irregular heart beat  Diabetes mellitus  Mother (Bianka, )  Heart disease  Father (Jacobo)  Comments:  2017 10:38 AM CST Leny Francisco  Bypass surgery at the age of 42.    2017 10:16 AM CST Leny Francisco  Irregular heart beat  Grandfather (P)  Comments:  2017 10:35 AM Leny León  Heart attack at the age of 50.  Grandmother (P) ()  Comments:  2017 10:35 AM Leny León  Heart attack at the age of 30.  Mother (Bianka, )  Comments:  2017 10:16 AM Leny León  Bypass graft  High blood pressure  Father (Jacobo)  Mother (Bianka, )  CA - Cancer  Mother (Bianka, )  MS - Multiple  sclerosis  Sister  Mother (Bianka, )  Aunt (M)  Colon Polyps  Mother (Bianka, )  Kidney Disease  Mother (Bianka, )  Comments:  2017 10:37 AM Leny León  Kidney failure  Colon cancer  Grandfather (M) ()  Great Uncle (P) ()  Great Grandfather (M)     Procedure history:    Cardiac computed tomography for calcium scoring (SNOMED CT 4638895827) on 2017 at 42 Years.  Comments:  2017 1:58 PM CST - Shantel Pizano  Total Agatston calcium score is 8.  Colonoscopy (procedure) (SNOMED CT 931842976) on 3/9/2017 at 42 Years.  Comments:  2021 11:56 AM CDT - Dallas Rodriges CMA  Outside Source Comment: Normal. Followup in 5 years due to family history of colon polyps.  THR - Total hip replacement (SNOMED CT 526764968) in  at 39 Years.  Comments:  2017 10:58 AM Onelia Hewitt  left  Pilonidal cyst (SNOMED CT 7768461984) on 2012 at 37 Years.  Comments:  2017 10:41 AM Leny León  Excision.  No abscess.  Nerve conduction study (SNOMED CT 20281030) on 2010 at 35 Years.  Cystoscopy (SNOMED CT 65292201) on 2009 at 34 Years.  Hysterectomy (SNOMED CT 457402012) on 2007 at 32 Years.  Comments:  2017 10:44 AM Leny León  Right salpingo-oophorectomy.   Social History:        Electronic Cigarette/Vaping Assessment            Electronic Cigarette Use: Never.      Alcohol Assessment            1 drink every 3 months or more.      Tobacco Assessment            Former smoker, quit more than 30 days ago, Stopped age About 33 Years.      Employment and Education Assessment            Employed, Work/School description: [employer].            Employed, Work/School description: [employer].      Exercise and Physical Activity Assessment            Exercise frequency: 2-3 x per wk..        Physical Examination   Vital Signs   2021 12:54 PM CDT Peripheral Pulse Rate 64 bpm    Pulse Site Radial artery    HR Method Manual    Systolic Blood  Pressure 126 mmHg    Diastolic Blood Pressure 66 mmHg    Mean Arterial Pressure 86 mmHg    BP Site Right arm    BP Method Manual      Measurements from flowsheet : Measurements   7/28/2021 12:54 PM CDT Height Measured - Standard 63 in    Weight Measured - Standard 123 lb    BSA 1.57 m2    Body Mass Index 21.79 kg/m2      General:  Alert and oriented, No acute distress.    Neurologic:  Alert, Oriented, No focal deficits, Cranial Nerves II-XII are grossly intact.       Impression and Plan   Diagnosis     Benign meningioma (FMX69-RS D32.9).     Plan:  We will start her on as needed tramadol warned her of side effects and potential for abuse.  Plan seeing her back for her preop.  .

## 2022-02-16 NOTE — PROGRESS NOTES
Patient:   YESSENIA RIDDLE            MRN: [id number]            FIN: [id number]               Age:   45 years     Sex:  Female     :  1975   Associated Diagnoses:   GERD without esophagitis; Hyperlipidemia, unspecified; Hypertension goal BP (blood pressure) < 130/80; Lumbosacral neuritis; Myofascial pain   Author:   Augustin Chatman MD      Visit Information      Date of Service: 2020 07:32 am  Performing Location: [facility]  Encounter#: [id number]      Primary Care Provider (PCP):  Auugstin Chatman MD# [id number]      Referring Provider:  Augustin Chatman MD# [id number]   Visit type:  Video Visit via Intellisense or [employer].    Participants in room during visit:  _   Location of patient:  _  Location of provider:  _ (Clinic office or Home office)  Video Start Time:  _  Video End Time:   _    Today's visit was conducted via video conference due to the COVID-19 pandemic.  The patient's consent to proceed with a video visit has been obtained and documented.      Chief Complaint   2020 2:15 PM CST    Chronic disease visit. Verbal consent given for video visit.  (will do lipid with next BMP in May)        History of Present Illness   Patient is being evaluated via a billable video visit.  Today s visit is a video visit because of the pandemic.  Patient is at work taken call from outside.  I have in my clinic office.  Call started at 230 ended at 2:40 PM  This is a follow-up on patient s hypertension on her chronic back pain on her reflux hyperlipidemia and myofascial pain myofascial pain.  She is doing well pain is controlled with gabapentin and Celebrex.  Atorvastatin for hyperlipidemia she does have some plaquing on a cardiac calcium score she has amlodipine for her hypertension and tizanidine for her back spasms.         Review of Systems   Constitutional:  Negative.    Eye:  Negative.    Ear/Nose/Mouth/Throat:  Negative.    Respiratory:  Negative.    Cardiovascular:   Negative.    Gastrointestinal:  Negative.    Genitourinary:  Negative.    Immunologic:  Negative.    Musculoskeletal:  Negative.    Integumentary:  Negative.    Neurologic:  Negative.    Psychiatric:  Negative.       Health Status   Allergies:    Allergic Reactions (Selected)  Severity Not Documented  Metal (No reactions were documented)  No Known Medication Allergies   Medications:  (Selected)   Prescriptions  Prescribed  amLODIPine 5 mg oral tablet: = 1 tab(s) ( 5 mg ), Oral, daily, # 30 tab(s), 0 Refill(s), Type: Maintenance, Pharmacy: [facility] #[id number], Needs appt for further refills, 1 tab(s) Oral daily, 63, in, 09/30/20 16:06:00 CDT, Height Measured, 133.6, lb, 09/30/20 16:06:00 CDT...  atorvastatin 40 mg oral tablet: = 1 tab(s), Oral, daily, # 90 tab(s), 1 Refill(s), Type: Maintenance, Pharmacy: [facility] #[id number], 1 tab(s) Oral daily  celecoxib 100 mg oral capsule: = 1 cap(s), Oral, bid, # 60 cap(s), 0 Refill(s), Type: Maintenance, Pharmacy: [facility] #[id number], TAKE 1 CAPSULE BY MOUTH TWICE DAILY, 63, in, 05/19/20 10:23:00 CDT, Height Measured, 134, lb, 05/19/20 10:23:00 CDT, Weight Measured  gabapentin 300 mg oral capsule: = 1 cap(s), Oral, tid, # 270 cap(s), 0 Refill(s), Type: Maintenance, Pharmacy: [facility] #[id number], due for appt for further refills, 1 cap(s) Oral tid, 63, in, 09/30/20 16:06:00 CDT, Height Measured, 133.6, lb, 09/30/20 16:06:00 CDT, Weight M...  tiZANidine 4 mg oral tablet: = 2 tab(s), Oral, q8 hrs, PRN: AS NEEDED FOR MUSCLE PAIN, # 60 tab(s), 0 Refill(s), Type: Maintenance, Pharmacy: [facility] #[id number], TAKE 2 TABLETS BY MOUTH EVERY 8 HOURS AS NEEDED FOR MUSCLE PAIN, 63, in, 09/30/20 16:06:00 CDT, Height Measur...  Documented Medications  Documented  Fish Oil 1000 mg oral capsule: = 1 cap(s) ( 1,000 mg ), Oral, daily, 0 Refill(s), Type: Maintenance  aspirin 81 mg oral tablet: 1 tab(s) ( 81 mg ), PO, Daily, # 30 tab(s), 0 Refill(s), Type:  Maintenance   Problem list:    All Problems  Abdominal pain / SNOMED CT 97462171 / Confirmed  Allergic rhinitis, unspecified / SNOMED CT 400179466 / Confirmed  Gastrocnemius equinus / SNOMED CT 8649194514 / Confirmed  Sleep disturbance / SNOMED CT 47356921 / Confirmed  Family history of heart attack / SNOMED CT 972657712 / Confirmed  GERD without esophagitis / SNOMED CT 2047596788 / Confirmed  Hyperlipidemia, unspecified / SNOMED CT 40064048 / Confirmed  Hypertension goal BP (blood pressure) < 130/80 / SNOMED CT 9647266302 / Confirmed  Lumbosacral neuritis / SNOMED CT 327892721 / Confirmed  Lumbosacral spondylosis without myelopathy / SNOMED CT 78764639 / Confirmed  Myofascial pain / SNOMED CT 089073710 / Confirmed  Nonspecific reaction to tuberculin skin test without active tuberculosis / SNOMED CT 4202295676 / Confirmed  Obesity / SNOMED CT 3477663454 / Probable      Histories   Past Medical History:    Active  Hyperlipidemia, unspecified (45451833): Onset on 7/18/2013 at 38 years.  Lumbosacral spondylosis without myelopathy (74136741): Onset on 1/3/2012 at 36 years.  Sleep disturbance (72661130): Onset on 6/27/2011 at 36 years.  Nonspecific reaction to tuberculin skin test without active tuberculosis (3005014246): Onset on 1/29/2010 at 35 years.  Allergic rhinitis, unspecified (143032256)  Gastrocnemius equinus (9509703945)  Lumbosacral neuritis (437726946)  Myofascial pain (189442271)  Resolved  Disorder of sacrum (69287925): Onset on 1/24/2012 at 36 years.  Resolved.  Back problem (792838018): Onset on 1/3/2012 at 36 years.  Resolved.  Myalgia (377045413): Onset on 6/27/2011 at 36 years.  Resolved.  Pain in joint involving lower leg (954604333): Onset on 1/21/2011 at 35 years.  Resolved.  Neuritis (692109158): Onset on 12/3/2010 at 35 years.  Resolved.  Comments:  12/14/2017 CST 9:40 AM CST - Leny Cherry  Thoracic or lumbosacral  Disturbance of skin sensation (601607483): Onset on 11/23/2010 at 35 years.   Resolved.  Lumbago (296210856): Onset on 2010 at 35 years.  Resolved.  Pain in limb (150084577): Onset on 2010 at 35 years.  Resolved.  Chronic sinusitis, unspecified (51899417): Onset on 2010 at 35 years.  Resolved.  Other Disorders of Menstruation and Other Abnormal Bleeding from Female Genital Tract (626.8):  Resolved.  Uterine Prolapse without Mention of Vaginal Wall Prolapse (618.1):  Resolved.  Pregnancy:  Resolved.  Tobacco Use Disorder (305.1):  Resolved.  Patellar tendinitis (48577708):  Resolved.  Pes anserinus bursitis (170455125):  Resolved.  Pilonidal cyst with abscess (360375891):  Resolved.  Pregnancy (077158636):  Resolved in  at 28 years.  Pregnancy (316835199):  Resolved in  at 29 years.  Lipidemia (09675279):  Resolved.  Knee pain (26973527):  Resolved.  Hip pain (09903376):  Resolved on 3/7/2019 at 44 years.  Facet syndrome (648777358):  Resolved.  Comments:  2017 CST 9:50 AM Leny León  Lumbar  Lumbosacral spondylosis (943839583):  Resolved.  Leg pain (975199596):  Resolved.  Patellofemoral syndrome (4933663631):  Resolved.  Dyslipidemia (6416472258):  Resolved.  Plantar fasciitis (883065942):  Resolved.  History of tobacco use (8057131227):  Resolved.  Dysfunctional uterine bleeding (25304929):  Resolved.  Pilonidal cyst with abscess (425367722):  Resolved.  Uterine prolapse (46280646):  Resolved.  H. pylori infection (0354607224):  Resolved.   Family History:    Cardiac pacemaker  Father (Jacobo)  Comments:  2017 10:17 AM Leny León  Irregular heart beat  Diabetes mellitus  Mother (Bianka, )  Heart disease  Father (Jacobo)  Comments:  2017 10:38 AM Leny León  Bypass surgery at the age of 40.    2017 10:16 AM Leny León  Irregular heart beat  Grandfather (P)  Comments:  2017 10:35 AM Leny León  Heart attack at the age of 50.  Grandmother (P) ()  Comments:  2017 10:35 AM NIKKIE Cherry  Leny  Heart attack at the age of 30.  Mother (Bianka, )  Comments:  2017 10:16 AM Leny León  Bypass graft  High blood pressure  Father (Jacobo)  Mother (Bianka, )  CA - Cancer  Mother (Bianka, )  MS - Multiple sclerosis  Sister  Mother (Bianka, )  Aunt (M)  Colon Polyps  Mother (Bianka, )  Kidney Disease  Mother (Bianka, )  Comments:  2017 10:37 AM Leny eLón  Kidney failure  Colon cancer  Grandfather (M) ()  Great Uncle (P) ()  Great Grandfather (M)     Procedure history:    Cardiac computed tomography for calcium scoring (SNOMED CT 6034008109) on 2017 at 42 Years.  Comments:  2017 1:58 PM CST - Shantel Pizano  Total Agatston calcium score is 8.  THR - Total hip replacement (SNOMED CT 399159190) in  at 39 Years.  Comments:  2017 10:58 AM Onelia Hewitt  left  Pilonidal cyst (SNOMED CT 0652187273) on 2012 at 37 Years.  Comments:  2017 10:41 AM Leny León  Excision.  No abscess.  Nerve conduction study (SNOMED CT 21557558) on 2010 at 35 Years.  Cystoscopy (SNOMED CT 54360903) on 2009 at 34 Years.  Hysterectomy (SNOMED CT 561067407) on 2007 at 32 Years.  Comments:  2017 10:44 AM Leny León  Right salpingo-oophorectomy.   Social History:        Electronic Cigarette/Vaping Assessment            Electronic Cigarette Use: Never.      Alcohol Assessment            1 drink every 3 months or more.      Tobacco Assessment            Former smoker, quit more than 30 days ago, Stopped age About 33 Years.      Employment and Education Assessment            Employed, Work/School description: CMA.            Employed, Work/School description: CMA.      Exercise and Physical Activity Assessment            Exercise frequency: 2-3 x per wk..        Physical Examination   Measurements from flowsheet : Measurements   2020 2:15 PM CST    Height Measured - Standard                 63 in     General:  Alert and oriented, No acute distress.    Eye:  Pupils are equal, round and reactive to light, Normal conjunctiva.    HENT:  Oral mucosa is moist.    Neck:  Supple.    Respiratory:  Respirations are non-labored.    Psychiatric:  Cooperative, Appropriate mood & affect, Normal judgment.       Impression and Plan   Diagnosis     GERD without esophagitis (WJC95-IO K21.9).     Hyperlipidemia, unspecified (WME30-CN E78.5).     Hypertension goal BP (blood pressure) < 130/80 (DIZ78-TX I10).     Lumbosacral neuritis (DTU74-VS M54.17).     Myofascial pain (ULY07-QQ M79.18).     Course:  Progressing as expected.    Plan:  The blood pressure she is read up from home under decent control.  Continue with her statin for hyperlipidemia and atherosclerosis.  We will make no changes in her gabapentin and Celebrex for her chronic pain.  We will plan on seeing her back in 6 months.  Cautioned her on tizanidine use  .

## 2022-02-16 NOTE — PROGRESS NOTES
Chief Complaint    f/up neck/back/shoulder pain  History of Present Illness      patient with several months of upper back and neck pain      then was diagnosed with influenza and then a sinus infection, treated with antibiotics      now noticing anterior chest wall pain, hurts to take a deep breath      pain is squeezing, pressure      feels short of breath because it hurts to take a deep breath      initially pain had been only with breathing but now is constant      pain is worse when lying down      has been on prednisone since last Monday without improvement      notes her MD told her to follow up in 3 days if not better but she wanted to give it a little more time      however over past 24 hours pain is intolerable, interrupting sleep, prednisone does not seem to be helping      notes strong family hx of CAD, has followed with cardiology for several years, has hx of elevated BP and cholesterol  Review of Systems      +headache at occiput      +neck pain, +back pain      not coughing, not congested      no GI symptoms, not diaphoretic  Physical Exam   Vitals & Measurements    T: 97.7   F (Tympanic)  HR: 82(Peripheral)  BP: 134/70  SpO2: 100%     HT: 63 in  WT: 136.2 lb  BMI: 24.12       patient is alert and oriented, cooperative, mild distress, holding her chest      eyes: PERRL, EOM intact, normal conjunctiva      heent: normocephalic, TMs normal, oral mucosa moist      neck: supple, no lymphadenopathy, no thyromegaly      CV: RRR, no murmur, no edema, normal peripheral perfusion      lungs: clear and equal bilaterally, no rales, rhonchi, or wheezes, painful respirations      EKG: normal sinus rhythm by my read, no abnormality      CXR: by my read normal, no fractures, no cardiomegaly, no infiltrates; Results discussed with patient. I will contact with any additional findings once read by radiologist.  Assessment/Plan       1. Chest pain (R07.9)         discussed possible causes; suspect musculoskeletal, doubt  pleuritis as seems to be related to neck and back pain and did not improve with prednisone; doubt cardiac with normal EKG and atypical symptoms; doubt pneumonia given normal XR, no signs of ongoing infection        will treat with muscle relaxers - notes that these did help when she took them in November        finish prednisone        follow up with Dr. Chatman        patient did express interest in PT as possible help but will wait to discuss with Dr. Chatman         Ordered:          51948 ecg routine ecg w/least 12 lds w/i+r (Task/Charge), Quantity: 1, Chest pain          XR Chest PA/Lat (Request), Chest pain                Orders:         cyclobenzaprine, = 1 tab(s) ( 10 mg ), PO, q8 hrs, PRN: for spasm, # 30 tab(s), 0 Refill(s), Type: Maintenance, Pharmacy: [facility] #[id number], 1 tab(s) Oral q8 hrs,PRN:for spasm, (Ordered)  Patient Information     Name:YESSENIA RIDDLE      Address:      [address] [place] [id number]     Sex:Female     YOB: 1975     [phone]     Emergency Contact:MILLY RIDDLE     MRN:[id number]     FIN:[id number]     Location[facility]     Date of Service:02/22/2020      Primary Care Physician:       Augustin Chatman MD, [phone]      Attending Physician:       Stephanie Langston MD, [phone]  Problem List/Past Medical History    Ongoing     Abdominal pain     Allergic rhinitis, unspecified     Family history of heart attack     Gastrocnemius equinus     GERD without esophagitis     Hyperlipidemia, unspecified     Hypertension goal BP (blood pressure) < 130/80     Lumbosacral neuritis     Lumbosacral spondylosis without myelopathy     Myofascial pain     Nonspecific reaction to tuberculin skin test without active tuberculosis     Obesity     Sleep disturbance    Historical     Back problem     Chronic sinusitis, unspecified     Disorder of sacrum     Disturbance of skin sensation     Dysfunctional uterine bleeding      Dyslipidemia     Facet syndrome       Comments: Lumbar     H. pylori infection     Hip pain     History of tobacco use     Knee pain     Leg pain     Lipidemia     Lumbago     Lumbosacral spondylosis     Myalgia     Neuritis       Comments: Thoracic or lumbosacral     Other Disorders of Menstruation and Other Abnormal Bleeding from Female Genital Tract     Pain in joint involving lower leg     Pain in limb     Patellar tendinitis     Patellofemoral syndrome     Pes anserinus bursitis     Pilonidal cyst with abscess     Pilonidal cyst with abscess     Plantar fasciitis     Pregnancy     Pregnancy     Pregnancy     Tobacco Use Disorder     Uterine prolapse     Uterine Prolapse without Mention of Vaginal Wall Prolapse  Procedure/Surgical History     Cardiac computed tomography for calcium scoring (11/30/2017)      Comments: Total Agatston calcium score is 8..     THR - Total hip replacement (2015)      Comments: left.     Excision of pilonidal cyst simple (07/17/2012)     Pilonidal cyst (07/17/2012)      Comments: Excision.  No abscess..     Nerve conduction study (09/02/2010)     Cystoscopy (09/14/2009)     Hysterectomy (06/07/2007)     Hysterectomy (06/07/2007)      Comments: Right salpingo-oophorectomy..  Medications    amLODIPine 5 mg oral tablet, 5 mg= 1 tab(s), Oral, daily, 1 refills    aspirin 81 mg oral tablet, 81 mg= 1 tab(s), Oral, daily    atorvastatin 40 mg oral tablet, 1 tab(s), Oral, daily, 1 refills    cyclobenzaprine 10 mg oral tablet, 10 mg= 1 tab(s), Oral, q8 hrs, PRN    Fish Oil 1000 mg oral capsule, 1000 mg= 1 cap(s), Oral, daily    predniSONE 10 mg oral tablet, 4 tabs daily for 3 days taper bey 1 tab every 3 days, Oral, daily  Allergies    Metal    No Known Medication Allergies  Social History    Smoking Status - 02/18/2020     Former smoker     Alcohol      1 drink every 3 months or more., 11/24/2017     Employment/School      Employed, Work/School description: CMA., 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 Stopped age 33 Years., 11/22/2017  Family History    CA - Cancer: Mother.    Cardiac pacemaker: Father.    Colon Polyps: Mother.    Colon cancer: Grandfather (M), Great Grandfather (M) and Great Uncle (P).    Diabetes mellitus: Mother.    Heart disease: Mother, Father, Grandfather (P) and Grandmother (P).    High blood pressure: Mother and Father.    Kidney Disease: Mother.    MS - Multiple sclerosis: Mother, Sister and Aunt (M).    Brother: History is negative    Sister: History is negative  Immunizations      Vaccine Date Status          influenza virus vaccine, inactivated 10/08/2019 Given          influenza virus vaccine, inactivated 09/25/2013 Recorded          influenza virus vaccine, inactivated 10/02/2012 Recorded          tetanus/diphth/pertuss (Tdap) adult/adol 10/02/2012 Recorded          influenza virus vaccine, inactivated 09/21/2009 Recorded          hepatitis B pediatric vaccine 05/04/2006 Recorded          hepatitis B pediatric vaccine 03/27/2006 Recorded

## 2022-02-16 NOTE — TELEPHONE ENCOUNTER
---------------------  From: Noris Wylie CMA (eRx Pool ([id number]_[facility]))   To: [facility] Message Pool ([id number]_[place] [facility] [place]);     Sent: 6/3/2021 6:19:26 AM CDT  Subject: FW: Medication Management   Due Date/Time: 6/2/2021 8:35:00 PM CDT       Last med check visit 12/8/20 chronic disease visit  Last filled 12/8/20 #180, 1  RTC due this month - reminder letter will go out          ** Submitted: **  Order:atorvastatin (atorvastatin 40 mg oral tablet)  1 tab(s)  Oral  daily  Qty:  30 tab(s)        Refills:  0          Substitutions Allowed     Route To [facility] #[id number]    Signed by Noris Wylie CMA  6/3/2021 11:18:00 AM [facility]    ** Submitted: **  Complete:atorvastatin (atorvastatin 40 mg oral tablet)   Signed by Noris Wylie CMA  6/3/2021 11:18:00 AM [facility]    ** Not Approved:  **  atorvastatin (ATORVASTATIN 40MG TABLETS)  TAKE 1 TABLET BY MOUTH DAILY  Qty:  90 tab(s)        Days Supply:  90        Refills:  0          Substitutions Allowed     Route To [facility] #[id number]   Signed by Noris Wylie CMA            ------------------------------------------  From: [facility] #[id number]  To: Augustin Chatman MD  Sent: June 1, 2021 8:35:19 PM CDT  Subject: Medication Management  Due: May 14, 2021 8:11:22 PM CDT     ** On Hold Pending Signature **     Dispensed Drug: celecoxib (celecoxib 100 mg oral capsule), TAKE 1 CAPSULE BY MOUTH TWICE DAILY  Quantity: 180 cap(s)  Days Supply: 90  Refills: 0  Substitutions Allowed  Notes from Pharmacy:     ** On Hold Pending Signature **     Dispensed Drug: atorvastatin (atorvastatin 40 mg oral tablet), TAKE 1 TABLET BY MOUTH DAILY  Quantity: 90 tab(s)  Days Supply: 90  Refills: 0  Substitutions Allowed  Notes from Pharmacy:  ---------------------------------------------------------------  [facility]: Rere Case   [id number]    Sent: 6/3/2021 7:36:27 AM CDT  Subject: FW: Medication Management     ** Submitted:  **  Order:celecoxib (celecoxib 100 mg oral capsule)  1 cap(s)  Oral  bid  Qty:  60 cap(s)        Refills:  0          Substitutions Allowed     Route To [facility] #[id number]    Signed by Rere Case  6/3/2021 12:36:00 PM [facility]    ** Submitted: **  Complete:celecoxib (celecoxib 100 mg oral capsule)   Signed by Rere Case  6/3/2021 12:36:00 PM [facility]    ** Not Approved:  **  celecoxib (CELECOXIB 100MG CAPSULES)  TAKE 1 CAPSULE BY MOUTH TWICE DAILY  Qty:  180 cap(s)        Days Supply:  90        Refills:  0          Substitutions Allowed     Route To [facility] #[id number]   Signed by Rere Case

## 2022-02-16 NOTE — PROGRESS NOTES
Patient:   YESSENIA RIDDLE            MRN: [id number]            FIN: [id number]               Age:   46 years     Sex:  Female     :  1975   Associated Diagnoses:   Giant cell arteritis   Author:   Roberto Pérez PA-C      Chief Complaint   2021 11:57 AM CDT   Pt here for left neck.left shoulder,left ear and head pain, with some edema on left temple on and off x 3 months.        History of Present Illness   Chief complaint and symptoms noted above and confirmed with patient   3 month hx of left neck, shoulder and ear pain,  some edema around left temple  headaches almost daily  recent visual change in left eye           Review of Systems   Constitutional:  Fatigue.    Eye:  Visual disturbances.    Ear/Nose/Mouth/Throat:  Negative.    Respiratory:  Negative.    Neurologic:  Headache.       Health Status   Allergies:    Allergic Reactions (Selected)  Severity Not Documented  Metal (No reactions were documented)  No Known Medication Allergies   Medications:  (Selected)   Prescriptions  Prescribed  amLODIPine 5 mg oral tablet: = 1 tab(s), Oral, daily, # 30 tab(s), 0 Refill(s), Type: Maintenance, Pharmacy: [facility] #[id number], 1 tab(s) Oral daily, 63, in, 21 14:00:00 CST, Height Measured, 130, lb, 21 14:00:00 CST, Weight Measured  atorvastatin 40 mg oral tablet: = 1 tab(s), Oral, daily, # 30 tab(s), 0 Refill(s), Type: Maintenance, Pharmacy: [facility] #[id number], Appt due for further refills, 1 tab(s) Oral daily, 63, in, 21 14:00:00 CST, Height Measured, 130, lb, 21 14:00:00 CST, Weight Patience...  celecoxib 100 mg oral capsule: = 1 cap(s), Oral, bid, # 60 cap(s), 0 Refill(s), Type: Maintenance, Pharmacy: [facility] #[id number], 1 cap(s) Oral bid, 63, in, 21 14:00:00 CST, Height Measured, 130, lb, 21 14:00:00 CST, Weight Measured  gabapentin 300 mg oral capsule: = 1 cap(s), Oral, tid, # 270 cap(s), 1 Refill(s), Type: Maintenance, Pharmacy: [id number], 1 cap(s) Oral tid, 63, in, 12/08/20 14:15:00 CST, Height Measured, 133.6, lb, 09/30/20 16:06:00 CDT, Weight Measured  tiZANidine 4 mg oral tablet: = 2 tab(s), Oral, q8 hrs, PRN: AS NEEDED FOR MUSCLE PAIN, # 60 tab(s), 0 Refill(s), Type: Maintenance, Pharmacy: [email or web address] #[id number], TAKE 2 TABLETS BY MOUTH EVERY 8 HOURS AS NEEDED FOR MUSCLE PAIN, 63, in, 02/24/21 14:00:00 CST, Height Measur...  Documented Medications  Documented  Fish Oil 1000 mg oral capsule: = 1 cap(s) ( 1,000 mg ), Oral, daily, 0 Refill(s), Type: Maintenance  aspirin 81 mg oral tablet: 1 tab(s) ( 81 mg ), PO, Daily, # 30 tab(s), 0 Refill(s), Type: Maintenance   Problem list:    All Problems (Selected)  Allergic rhinitis, unspecified / SNOMED CT 385370767 / Confirmed  Lumbosacral neuritis / SNOMED CT 529516863 / Confirmed  Hypertension goal BP (blood pressure) < 130/80 / SNOMED CT 7750310186 / Confirmed  Obesity / SNOMED CT 3871448871 / Probable  GERD without esophagitis / SNOMED CT 5603354420 / Confirmed  Nonspecific reaction to tuberculin skin test without active tuberculosis / SNOMED CT 7677597328 / Confirmed  Gastrocnemius equinus / SNOMED CT 7311884630 / Confirmed  Abdominal pain / SNOMED CT 21326987 / Confirmed  Family history of heart attack / SNOMED CT 845568356 / Confirmed  Myofascial pain / SNOMED CT 370169626 / Confirmed  Lumbosacral spondylosis without myelopathy / SNOMED CT 10053198 / Confirmed  Sleep disturbance / SNOMED CT 23097394 / Confirmed  Hyperlipidemia, unspecified / SNOMED CT 69099380 / Confirmed      Histories   Past Medical History:    Active  Hyperlipidemia, unspecified (33106998): Onset on 7/18/2013 at 38 years.  Lumbosacral spondylosis without myelopathy (50656692): Onset on 1/3/2012 at 36 years.  Sleep disturbance (43890490): Onset on 6/27/2011 at 36 years.  Nonspecific reaction to tuberculin skin test without active tuberculosis (6203941615): Onset on 1/29/2010 at 35 years.  Allergic rhinitis, unspecified  (856839960)  Gastrocnemius equinus (4686034266)  Lumbosacral neuritis (257334314)  Myofascial pain (439815245)  Resolved  Disorder of sacrum (34782115): Onset on 1/24/2012 at 36 years.  Resolved.  Back problem (291601000): Onset on 1/3/2012 at 36 years.  Resolved.  Myalgia (396915363): Onset on 6/27/2011 at 36 years.  Resolved.  Pain in joint involving lower leg (071386297): Onset on 1/21/2011 at 35 years.  Resolved.  Neuritis (153551513): Onset on 12/3/2010 at 35 years.  Resolved.  Comments:  12/14/2017 CST 9:40 AM Leny León  Thoracic or lumbosacral  Disturbance of skin sensation (923013420): Onset on 11/23/2010 at 35 years.  Resolved.  Lumbago (499657433): Onset on 9/22/2010 at 35 years.  Resolved.  Pain in limb (811250723): Onset on 9/7/2010 at 35 years.  Resolved.  Chronic sinusitis, unspecified (17682998): Onset on 2/16/2010 at 35 years.  Resolved.  Other Disorders of Menstruation and Other Abnormal Bleeding from Female Genital Tract (626.8):  Resolved.  Uterine Prolapse without Mention of Vaginal Wall Prolapse (618.1):  Resolved.  Pregnancy:  Resolved.  Tobacco Use Disorder (305.1):  Resolved.  Patellar tendinitis (79702061):  Resolved.  Pes anserinus bursitis (323967809):  Resolved.  Pilonidal cyst with abscess (966537565):  Resolved.  Pregnancy (587055201):  Resolved in 2004 at 28 years.  Pregnancy (772055982):  Resolved in 2005 at 29 years.  Lipidemia (02198222):  Resolved.  Knee pain (20185995):  Resolved.  Hip pain (90717541):  Resolved on 3/7/2019 at 44 years.  Facet syndrome (231544486):  Resolved.  Comments:  12/14/2017 CST 9:50 AM Leny León  Lumbar  Lumbosacral spondylosis (331325609):  Resolved.  Leg pain (723257688):  Resolved.  Patellofemoral syndrome (5772458324):  Resolved.  Dyslipidemia (8208413436):  Resolved.  Plantar fasciitis (438267375):  Resolved.  History of tobacco use (4100404583):  Resolved.  Dysfunctional uterine bleeding (20532408):  Resolved.  Pilonidal cyst with  abscess (700748528):  Resolved.  Uterine prolapse (39376894):  Resolved.  H. pylori infection (5519398000):  Resolved.   Family History:    Cardiac pacemaker  Father (Jacobo)  Comments:  2017 10:17 AM Leny León  Irregular heart beat  Diabetes mellitus  Mother (Bianka, )  Heart disease  Father (Jacobo)  Comments:  2017 10:38 AM CST Leny Francisco  Bypass surgery at the age of 40.    2017 10:16 AM CST Leny Francisco  Irregular heart beat  Grandfather (P)  Comments:  2017 10:35 AM CST Leny Francisco  Heart attack at the age of 50.  Grandmother (P) ()  Comments:  2017 10:35 AM Leny León  Heart attack at the age of 30.  Mother (Bianka, )  Comments:  2017 10:16 AM Leny León  Bypass graft  High blood pressure  Father (Jacobo)  Mother (Bianka, )  CA - Cancer  Mother (Bianka, )  MS - Multiple sclerosis  Sister  Mother (Bianka, )  Aunt (M)  Colon Polyps  Mother (Bianka, )  Kidney Disease  Mother (Bianka, )  Comments:  2017 10:37 AM Leny León  Kidney failure  Colon cancer  Grandfather (M) ()  Great Uncle (P) ()  Great Grandfather (M)     Procedure history:    Cardiac computed tomography for calcium scoring (0481142983) on 2017 at 42 Years.  Comments:  2017 1:58 PM CST - Shantel Pizano  Total Agatston calcium score is 8.  Colonoscopy (procedure) (778812237) on 3/9/2017 at 42 Years.  Comments:  2021 11:56 AM CDT - Dallas Rodriges CMA  Outside Source Comment: Normal. Followup in 5 years due to family history of colon polyps.  THR - Total hip replacement (868075609) in  at 39 Years.  Comments:  2017 10:58 AM Onelia Hewitt  left  Pilonidal cyst (8993278392) on 2012 at 37 Years.  Comments:  2017 10:41 AM CST - Leny Cherry.  No abscess.  Nerve conduction study (71818025) on 2010 at 35 Years.  Cystoscopy (82138983) on 2009 at 34  Years.  Hysterectomy (119483412) on 6/7/2007 at 32 Years.  Comments:  12/14/2017 10:44 AM NIKKIE - Dilip Dottye  Right salpingo-oophorectomy.   Social History:        Electronic Cigarette/Vaping Assessment            Electronic Cigarette Use: Never.      Alcohol Assessment            1 drink every 3 months or more.      Tobacco Assessment            Former smoker, quit more than 30 days ago, Stopped age About 33 Years.      Employment and Education Assessment            Employed, Work/School description: CMA.            Employed, Work/School description: CMA.      Exercise and Physical Activity Assessment            Exercise frequency: 2-3 x per wk..        Physical Examination   Vital Signs   7/26/2021 11:57 AM CDT Temperature Tympanic 97 DegF  LOW    Peripheral Pulse Rate 72 bpm    Pulse Site Radial artery    Respiratory Rate 16 br/min    Systolic Blood Pressure 114 mmHg    Diastolic Blood Pressure 72 mmHg    Mean Arterial Pressure 86 mmHg    BP Site Right arm    Oxygen Saturation 99 %      Measurements from flowsheet : Measurements   7/26/2021 11:57 AM CDT Height Measured - Standard 63 in    Weight Measured - Standard 123 lb    BSA 1.57 m2    Body Mass Index 21.79 kg/m2      General:  Mild distress.    Eye:  Pupils are equal, round and reactive to light, Extraocular movements are intact.    HENT:  Tympanic membranes are clear, tender over left temple and left jaw.    Neck:  Supple, Non-tender, No lymphadenopathy.    Respiratory:  Lungs are clear to auscultation.    Cardiovascular:  Normal rate, Regular rhythm, No murmur.       Impression and Plan   Diagnosis     Giant cell arteritis ([id number]-GR M31.6).     Summary:  because of the tenderness of her left temple, vision changes, and other sxs, am concerned about temple arteritis  will check labs, refer to general surgeon for temple biopsy  and start on high dose prednisone (50 mg qd for now).    Orders     Orders   Lab (Gen Lab  Reference Lab):  Hepatic Function Panel*  ([facility]) (Order): Specimen Type: Serum, Collection Date: 7/26/2021 12:19 PM CDT  Sed rate by modified westergren* ([facility]) (Order): Specimen Type: Blood, Collection Date: 7/26/2021 12:19 PM CDT  C-Reactive Protein* ([facility]) (Order): Specimen Type: Serum, Collection Date: 7/26/2021 12:19 PM CDT  CBC (h/h, RBC, indices, WBC, Plt)* ([facility]) (Order): Specimen Type: Blood, Collection Date: 7/26/2021 12:19 PM CDT  Basic Metabolic Panel* ([facility]) (Order): Specimen Type: Serum, Collection Date: 7/26/2021 12:19 PM CDT.     Orders   Requests (Consults / Referrals):  Referral (Request) (Order): 7/26/2021 12:23 PM CDT, Referred to: General Surgery, Reason for referral: temple artery biopsy, Priority: STAT, Giant cell arteritis.     Orders   Pharmacy:  predniSONE 50 mg oral tablet (Prescribe): = 1 tab(s) ( 50 mg ), Oral, daily, Instructions: with food or milk, # 30 tab(s), 0 Refill(s), Type: Maintenance, [id number], 1 tab(s) Oral daily,Instr:with food or milk, 63, in, 7/26/2021 11:57 AM CDT, Height Measured, 123, lb, 7/26/2021 11:57 AM CDT, Weight Measured.     Orders   Charges (Evaluation and Management):  16394 office o/p est low 20-29 min (Charge) (Order): Quantity: 1, Giant cell arteritis.

## 2022-02-16 NOTE — TELEPHONE ENCOUNTER
---------------------  From: Shae Flores RN (Phone Messages Pool ([address] [place] [address]))   To: TFS Message Pool ([address] [place] [address]); Unit 2 Pool ([address] [place] [address]) ;     Sent: 8/2/2021 8:45:56 AM CDT  Subject: General Message       PCP:  [facility]      Time of Call: 7:19am       Person Calling: pt  Phone number:  [phone]    Returned call at: 8:44am LM    Pt LVM stating she received an RX for tramadol last week, taking one tab BID and is not touching the pain. Pt requests to take two tabs BID. Pt stated she has an appt with neurosurgery on Sept. 3rd.     Last office visit and reason:---------------------  From: Juliann Raymond CMA ([address] Pool ([address] [place] [address]) )   To: TFS Message Pool ([address] [place] [address]);     Sent: 8/2/2021 8:56:18 AM CDT  Subject: FW: General Message---------------------  From: Indira Jean Baptiste LPN (TFS Message Pool ([address] [place] [address]))   To: Augustin Chatman MD;     Sent: 8/3/2021 8:04:58 AM CDT  Subject: FW: General Message---------------------  From: Augustin Chatman MD   To: TFS Message Pool ([address] [place] [address]);     Sent: 8/3/2021 8:58:22 AM CDT  Subject: RE: General Message     I sent the med  She needs to call neurosurg to let them know her headache is getting alot worse and is taking narcotics for the painLVM for patient that medication was sent and to contact neurosurgeon.

## 2022-02-16 NOTE — LETTER
(Inserted Image. Unable to display)   March 08, 2019      YESSENIA RIDDLE      [address] [place], [place] [id number]        Dear YESSENIA,    Thank you for selecting [facility] for your healthcare needs.  Below you will find the results of the recent tests done at our clinic.    All labs acceptable.      Result Name Current Result Previous Result Reference Range   Sodium Level (mmol/L)  140 3/7/2019  141 9/25/2018 135 - 146   Potassium Level (mmol/L)  4.4 3/7/2019  4.0 9/25/2018 3.5 - 5.3   Chloride Level (mmol/L)  105 3/7/2019  108 9/25/2018 98 - 110   CO2 Level (mmol/L)  26 3/7/2019  27 9/25/2018 20 - 32   Glucose Level (mg/dL)  89 3/7/2019 ((H)) 105 9/25/2018 65 - 99   BUN (mg/dL)  13 3/7/2019  19 9/25/2018 7 - 25   Creatinine Level (mg/dL)  0.78 3/7/2019  0.68 9/25/2018 0.50 - 1.10   BUN/Creat Ratio  NOT APPLICABLE 3/7/2019  NOT APPLICABLE 9/25/2018 6 - 22   eGFR (mL/min/1.73m2)  94 3/7/2019  109 9/25/2018 > OR = 60 -    eGFR  (mL/min/1.73m2)  109 3/7/2019  126 9/25/2018 > OR = 60 -    Calcium Level (mg/dL)  9.9 3/7/2019  9.4 9/25/2018 8.6 - 10.2   Bilirubin Total (mg/dL)  0.6 3/7/2019  0.3 9/25/2018 0.2 - 1.2   Alkaline Phosphatase (unit/L)  59 3/7/2019  78 9/25/2018 33 - 115   AST/SGOT (unit/L)  20 3/7/2019  17 9/25/2018 10 - 30   ALT/SGPT (unit/L)  16 3/7/2019  14 9/25/2018 6 - 29   Protein Total (gm/dL)  7.1 3/7/2019  7.0 9/25/2018 6.1 - 8.1   Albumin Level (gm/dL)  4.7 3/7/2019  4.5 9/25/2018 3.6 - 5.1   Globulin  2.4 3/7/2019  2.5 9/25/2018 1.9 - 3.7   A/G Ratio  2.0 3/7/2019  1.8 9/25/2018 1.0 - 2.5   WBC  4.8 3/7/2019  7.2 9/25/2018 3.8 - 10.8   RBC  4.84 3/7/2019  4.35 9/25/2018 3.80 - 5.10   Hgb (gm/dL)  14.6 3/7/2019  13.6 9/25/2018 11.7 - 15.5   Hct (%)  43.0 3/7/2019  38.8 9/25/2018 35.0 - 45.0   MCV (fL)  88.8 3/7/2019  89.2 9/25/2018 80.0 - 100.0   MCH (pg)  30.2 3/7/2019  31.3 9/25/2018 27.0 - 33.0   MCHC (gm/dL)  34.0 3/7/2019  35.1 9/25/2018 32.0 - 36.0   RDW (%)   12.5 3/7/2019  12.1 9/25/2018 11.0 - 15.0   Platelet  269 3/7/2019  328 9/25/2018 140 - 400   MPV (fL)  11.9 3/7/2019  11.4 9/25/2018 7.5 - 12.5   TSH (mIU/L)  2.61 3/7/2019  2.31 12/5/2017        Please contact me or my assistant at [phone] if you have any questions.     Sincerely,        Augustin Chatman MD        What do your labs mean?  Below is a glossary of commonly ordered labs:  LDL   Bad Cholesterol   HDL   Good Cholesterol  AST/ALT   Liver Function   Cr/Creatinine   Kidney Function  Microalbumin   Kidney Function  BUN   Kidney Function  PSA   Prostate    TSH   Thyroid Hormone  HgbA1c   Diabetes Test   Hgb (Hemoglobin)   Red Blood Cells

## 2022-02-16 NOTE — LETTER
(Inserted Image. Unable to display)   June 09, 2021  YESSENIA RIDDLE  [address] [place] [address]          Dear YESSENIA,      Thank you for selecting [facility] for your healthcare needs.    Our records indicate you are due for the following services:    Hypertension check ~ please remember to bring your at-home blood pressure readings with you to your appointment.   Non-Fasting Labs    If you had your labs done at another facility or with Direct Access Lab Testing at [facility], please bring in a copy of the results to your next visit, mail a copy, or drop off a copy of your results to your Healthcare Provider.    4You are due for lab work and an office visit; please schedule the lab appointment 1 week before the office visit.  This will assure all results are available to discuss with your Healthcare Provider during your visit.    (FYI   Regarding office visits: In some instances, a video visit or telephone visit may be offered as an option.)      **It is very helpful if you bring your medication bottles to your appointment.  This assures we have all of your current medications, including strength and dosing information, documented accurately in your medical record.    To schedule an appointment or if you have further questions, please contact your clinic at [phone].     Powered by [facility]    Sincerely,    Augustin Chatman M.D.

## 2022-02-16 NOTE — TELEPHONE ENCOUNTER
---------------------  From: Augustin Chatman MD   To: Rere Case;     Sent: 3/7/2019 8:47:09 AM CST  Subject: General Message     Have her hold atorvastatin for a month to see if it changes abdominal sxs---------------------  From: Rere Case   To: [facility] Message Pool ([id number] - [place]);     Sent: 3/8/2019 7:50:22 AM CST  Subject: Hold Atorvastatin     Left message for patient to call back at: 7:49amSpoke with patient and notified of recommendation.

## 2022-02-16 NOTE — TELEPHONE ENCOUNTER
Entered by Dallas Rodriges CMA on January 15, 2019 3:44:56 PM CST  ---------------------  From: Dallas Rodriges CMA   To: [email or web address]    Sent: 1/15/2019 3:44:56 PM CST  Subject: Medication Management     ** Submitted: **  Order:atorvastatin (atorvastatin 40 mg oral tablet)  1 tab(s)  Oral  daily  Qty:  30 tab(s)        Refills:  0          YOHANNES     Route To [email or web address]    Signed by Dallas Rodriges CMA  1/15/2019 3:44:00 PM    ** Submitted: **  Complete:atorvastatin (atorvastatin 40 mg oral tablet)   Signed by Dallas Rodriges CMA  1/15/2019 3:44:00 PM    ** Not Approved:  **  atorvastatin (ATORVASTATIN 40MG TABLETS)  TAKE 1 TABLET BY MOUTH DAILY  Qty:  90 tab(s)        Days Supply:  90        Refills:  0          YOHANNES     Route To [email or web address]   Signed by Dallas Rodriges CMA            ** Submitted: **  Order:amLODIPine (amLODIPine 2.5 mg oral tablet)  1 tab(s)  Oral  daily  Qty:  30 tab(s)        Refills:  0          YOHANNES     Route To [email or web address]    Signed by Dallas Rodriges CMA  1/15/2019 3:43:00 PM    ** Submitted: **  Complete:amLODIPine (amLODIPine 2.5 mg oral tablet)   Signed by Dallas Rodriges CMA  1/15/2019 3:43:00 PM    ** Not Approved:  **  amLODIPine (AMLODIPINE BESYLATE 2.5MG TABLETS)  TAKE 1 TABLET BY MOUTH DAILY  Qty:  90 tab(s)        Days Supply:  90        Refills:  0          YOHANNES     Route To [email or web address]   Signed by Dallas Rodriges CMA            ------------------------------------------  From: [email or web address]  To: Augustin Chatman MD  Sent: January 14, 2019 2:58:30 PM CST  Subject: Medication Management  Due: January 15, 2019 2:58:30 PM CST    ** On Hold Pending Signature **  Drug: atorvastatin (atorvastatin 40 mg oral tablet)  1 TAB(S) ORAL DAILY  Quantity: 90 tab(s)     Days Supply: 0         Refills: 0  Substitutions Allowed  Notes from Pharmacy:     Dispensed Drug:  atorvastatin (atorvastatin 40 mg oral tablet)  TAKE 1 TABLET BY MOUTH DAILY  Quantity: 90 tab(s)     Days Supply: 90        Refills: 0  Substitutions Allowed  Notes from Pharmacy:     ** On Hold Pending Signature **  Drug: amLODIPine (amLODIPine 2.5 mg oral tablet)  1 TAB(S) ORAL DAILY  Quantity: 90 tab(s)     Days Supply: 0         Refills: 0  Substitutions Allowed  Notes from Pharmacy:     Dispensed Drug: amLODIPine (amLODIPine 2.5 mg oral tablet)  TAKE 1 TABLET BY MOUTH DAILY  Quantity: 90 tab(s)     Days Supply: 90        Refills: 0  Substitutions Allowed  Notes from Pharmacy:   ------------------------------------------Med Refill      Date of last office visit and reason:  10-1-18      Date of last Med Check / Px:     Date of last labs pertaining to med:  lipid 12-5-17, BMP 9-25-18    Note:  Rx filled per protocol.  Pt has 1-15-19 appt scheduled Dallas Rodriges CMA    RTC order in chart:  yes    For Protocol refill, has patient been contacted:  _

## 2022-02-16 NOTE — TELEPHONE ENCOUNTER
Entered by Ana Luisa Watson on October 03, 2019 10:05:13 AM CDT  ---------------------  From: Ana Luisa Watson   To: [facility] #[id number]    Sent: 10/3/2019 10:05:13 AM CDT  Subject: Medication Management     ** Not Approved: Patient needs appointment **  atorvastatin (ATORVASTATIN 40MG TABLETS)  TAKE 1 TABLET BY MOUTH DAILY  Qty:  30 tab(s)        Days Supply:  30        Refills:  0          Substitutions Allowed     Route To [id number]   Signed by Ana Luisa Watson            ** Not Approved: Patient needs appointment **  amLODIPine (AMLODIPINE BESYLATE 2.5MG TABLETS)  TAKE 1 TABLET BY MOUTH DAILY  Qty:  30 tab(s)        Days Supply:  30        Refills:  0          Substitutions Allowed     Route To [facility] #[id number]   Signed by Ana Luisa Watson            ------------------------------------------  From: [facility] #[id number]  To: Augustin Chatman MD  Sent: October 3, 2019 9:27:09 AM CDT  Subject: Medication Management  Due: October 4, 2019 9:27:09 AM CDT    ** On Hold Pending Signature **  Drug: amLODIPine (amLODIPine 2.5 mg oral tablet)  1 TAB(S) ORAL DAILY  Quantity: 30 tab(s)     Days Supply: 0         Refills: 0  Substitutions Allowed  Notes from Pharmacy: Pt due for appt and fasting labwork for further refills    Dispensed Drug: amLODIPine (amLODIPine 2.5 mg oral tablet)  TAKE 1 TABLET BY MOUTH DAILY  Quantity: 30 tab(s)     Days Supply: 30        Refills: 0  Substitutions Allowed  Notes from Pharmacy:     ** On Hold Pending Signature **  Drug: atorvastatin (atorvastatin 40 mg oral tablet)  1 TAB(S) ORAL DAILY  Quantity: 30 tab(s)     Days Supply: 0         Refills: 0  Substitutions Allowed  Notes from Pharmacy: Pt due for appt and labwork for further refills    Dispensed Drug: atorvastatin (atorvastatin 40 mg oral tablet)  TAKE 1 TABLET BY MOUTH DAILY  Quantity: 30 tab(s)     Days Supply: 30         Refills: 0  Substitutions Allowed  Notes from Pharmacy:   ------------------------------------------

## 2022-02-16 NOTE — LETTER
(Inserted Image. Unable to display)   March 19, 2019      YESSENIA RIDDLE  [address]  [place], [place] [id number]        Dear YESSENIA,     Thank you for selecting [facility] for your healthcare needs. Below you will find the results of your recent test(s) done at our clinic.      Stool studies are normal. C. difficile testing could not be performed because stool was performed.  If having persistent liquidy stool (diarrhea) in the near future, then it would be worth retesting      Result Name Current Result   Clostridium difficile Toxin  See comment 3/15/2019   Culture Campylobacter  See comment 3/15/2019   Culture Salmonella/Shigella  See comment 3/15/2019       Please contact me or my assistant at [phone] if you have any questions or concerns.     Sincerely,        Jorgito Ovalle MD    What do your labs mean?  Below is a glossary of commonly ordered labs:  LDL - Bad Cholesterol  HDL - Good Cholesterol  AST/ALT - Liver Function  Cr/Creatinine - Kidney Function  Microalbumin - Kidney Function  BUN - Kidney Function  PSA - Prostate   TSH - Thyroid Hormone  HgbA1c - Diabetes Test  Hgb (Hemoglobin) - Red Blood Cells

## 2022-02-16 NOTE — TELEPHONE ENCOUNTER
---------------------  From: Noris Wylie CMA (eRx Pool (32224_[facility]))   To: [facility] Message Pool ([address]_[place]);     Sent: 9/9/2021 1:22:37 PM CDT  Subject: FW: Medication Management   Due Date/Time: 9/9/2021 5:53:00 AM CDT     Tizanidine 8/11/21 #60  Celebrex 8/11/21 #60    Last visit 7/28/21 ER f/u      ------------------------------------------  From: [facility] #[id number]  To: Augustin Chatman MD  Sent: September 8, 2021 5:53:41 AM CDT  Subject: Medication Management  Due: August 20, 2021 11:16:59 AM CDT     ** On Hold Pending Signature **     Dispensed Drug: celecoxib (celecoxib 100 mg oral capsule), TAKE 1 CAPSULE BY MOUTH TWICE DAILY  Quantity: 60 cap(s)  Days Supply: 30  Refills: 0  Substitutions Allowed  Notes from Pharmacy:     ** On Hold Pending Signature **     Dispensed Drug: tiZANidine (tiZANidine 4 mg oral tablet), TAKE 2 TABLETS BY MOUTH EVERY 8 HOURS AS NEEDED FOR MUSCLE PAIN  Quantity: 60 tab(s)  Days Supply: 10  Refills: 0  Substitutions Allowed  Notes from Pharmacy:     ** On Hold Pending Signature **     Dispensed Drug: amLODIPine (amLODIPine 5 mg oral tablet), TAKE 1 TABLET BY MOUTH DAILY  Quantity: 30 tab(s)  Days Supply: 30  Refills: 0  Substitutions Allowed  Notes from Pharmacy:  ---------------------------------------------------------------  From: Anum Gama CMA (TFS Message Pool (32224_[facility]))   To: Augustin Chatman MD;     Sent: 9/9/2021 3:52:03 PM CDT  Subject: FW: Medication Management   Due Date/Time: 9/9/2021 5:53:00 AM CDT  ** Submitted: **  Complete:celecoxib (celecoxib 100 mg oral capsule)   Signed by Augustin Chatman MD  9/10/2021 12:11:00 PM [facility]    ** Approved **  celecoxib (CELECOXIB 100MG CAPSULES)  TAKE 1 CAPSULE BY MOUTH TWICE DAILY  Qty:  60 cap(s)        Days Supply:  30        Refills:  0          Substitutions Allowed     Route To Pharmacy - [facility] #[id number]---------------------  From: Augustin Chatman MD   To:  [facility] #[id number]    Sent: 9/10/2021 10:25:28 AM CDT  Subject: FW: Medication Management     ** Not Approved: Patient needs appointment **  tiZANidine (TIZANIDINE 4MG TABLETS)  TAKE 2 TABLETS BY MOUTH EVERY 8 HOURS AS NEEDED FOR MUSCLE PAIN  Qty:  60 tab(s)        Days Supply:  10        Refills:  0          Substitutions Allowed     Route To Pharmacy - [facility] #[id number]

## 2022-02-16 NOTE — PROGRESS NOTES
Patient:   YESSENIA RIDDLE            MRN: [id number]            FIN: [id number]               Age:   45 years     Sex:  Female     :  1975   Associated Diagnoses:   Acute cervical radiculopathy; Chest pain; D-dimer, elevated   Author:   Augustin Chatman MD      Visit Information      Date of Service: 2020 08:12 am  Performing Location: [facility]  Encounter#: [id number]      Primary Care Provider (PCP):  Augustin Chatman MD    NPI# [id number]      Referring Provider:  Augustin Chatman MD# [id number]      Chief Complaint   2020 8:24 AM CST    f/up        History of Present Illness   Patient is in for continued follow-up.  She was in over the weekend with chest pain.  She continues to struggle with her right-sided neck pain radiating into her right chest and her scapula.  She notes it hurts to breathe.  Prednisone did not help.  No numbness tingling weakness of extremities.  No cough.  No shortness of breath.         Review of Systems   Constitutional:  Negative except as documented in history of present illness.    Eye:  Negative.    Ear/Nose/Mouth/Throat:  Negative.    Respiratory:  Negative except as documented in history of present illness.    Cardiovascular:  Negative except as documented in history of present illness.    Gastrointestinal:  Negative.    Genitourinary:  Negative.    Musculoskeletal:  Negative except as documented in history of present illness.    Integumentary:  Negative.    Neurologic:  Negative except as documented in history of present illness.       Health Status   Allergies:    Allergic Reactions (Selected)  Severity Not Documented  Metal (No reactions were documented)  No Known Medication Allergies   Medications:  (Selected)   Prescriptions  Prescribed  amLODIPine 5 mg oral tablet: = 1 tab(s) ( 5 mg ), Oral, daily, # 90 tab(s), 1 Refill(s), Type: Maintenance, Pharmacy: [facility] #[id number], 1 tab(s) Oral daily  atorvastatin 40 mg oral tablet:  = 1 tab(s), Oral, daily, # 90 tab(s), 1 Refill(s), Type: Maintenance, Pharmacy: [facility] #[id number], 1 tab(s) Oral daily  cyclobenzaprine 10 mg oral tablet: = 1 tab(s) ( 10 mg ), PO, q8 hrs, PRN: for spasm, # 30 tab(s), 0 Refill(s), Type: Maintenance, Pharmacy: [facility] #[id number], 1 tab(s) Oral q8 hrs,PRN:for spasm  predniSONE 10 mg oral tablet: 4 tabs daily for 3 days taper bey 1 tab every 3 days, Oral, daily, # 30 tab(s), 0 Refill(s), Type: Maintenance, Pharmacy: [email or web address] #[id number], 4 tabs daily for 3 days taper bey 1 tab every 3 days Oral daily  Documented Medications  Documented  Fish Oil 1000 mg oral capsule: = 1 cap(s) ( 1,000 mg ), Oral, daily, 0 Refill(s), Type: Maintenance  aspirin 81 mg oral tablet: 1 tab(s) ( 81 mg ), PO, Daily, # 30 tab(s), 0 Refill(s), Type: Maintenance,    Medications          *denotes recorded medication          amLODIPine 5 mg oral tablet: 5 mg, 1 tab(s), Oral, daily, 90 tab(s), 1 Refill(s).          *aspirin 81 mg oral tablet: 81 mg, 1 tab(s), PO, Daily, 30 tab(s), 0 Refill(s).          atorvastatin 40 mg oral tablet: 1 tab(s), Oral, daily, 90 tab(s), 1 Refill(s).          cyclobenzaprine 10 mg oral tablet: 10 mg, 1 tab(s), PO, q8 hrs, PRN: for spasm, 30 tab(s), 0 Refill(s).          *Fish Oil 1000 mg oral capsule: 1,000 mg, 1 cap(s), Oral, daily, 0 Refill(s).          predniSONE 10 mg oral tablet: 4 tabs daily for 3 days taper bey 1 tab every 3 days, Oral, daily, 30 tab(s), 0 Refill(s).       Problem list:    All Problems (Selected)  Abdominal pain / SNOMED CT 97609002 / Confirmed  Allergic rhinitis, unspecified / SNOMED CT 768737114 / Confirmed  Gastrocnemius equinus / SNOMED CT 2406304680 / Confirmed  Sleep disturbance / SNOMED CT 79670263 / Confirmed  Family history of heart attack / SNOMED CT 741571633 / Confirmed  GERD without esophagitis / SNOMED CT 9114325462 / Confirmed  Hyperlipidemia, unspecified / SNOMED CT 34480379 / Confirmed  Hypertension  goal BP (blood pressure) < 130/80 / SNOMED CT 7237439437 / Confirmed  Lumbosacral neuritis / SNOMED CT 936885475 / Confirmed  Lumbosacral spondylosis without myelopathy / SNOMED CT 15261215 / Confirmed  Myofascial pain / SNOMED CT 200867574 / Confirmed  Nonspecific reaction to tuberculin skin test without active tuberculosis / SNOMED CT 4107995675 / Confirmed  Obesity / SNOMED CT 7724177405 / Probable      Histories   Past Medical History:    Active  Hyperlipidemia, unspecified (50970715): Onset on 7/18/2013 at 38 years.  Lumbosacral spondylosis without myelopathy (27521136): Onset on 1/3/2012 at 36 years.  Sleep disturbance (23907794): Onset on 6/27/2011 at 36 years.  Nonspecific reaction to tuberculin skin test without active tuberculosis (4434770329): Onset on 1/29/2010 at 35 years.  Allergic rhinitis, unspecified (562860344)  Gastrocnemius equinus (3314938447)  Lumbosacral neuritis (484710369)  Myofascial pain (186640698)  Resolved  Disorder of sacrum (06042439): Onset on 1/24/2012 at 36 years.  Resolved.  Back problem (293592922): Onset on 1/3/2012 at 36 years.  Resolved.  Myalgia (266887494): Onset on 6/27/2011 at 36 years.  Resolved.  Pain in joint involving lower leg (588655114): Onset on 1/21/2011 at 35 years.  Resolved.  Neuritis (118602793): Onset on 12/3/2010 at 35 years.  Resolved.  Comments:  12/14/2017 CST 9:40 AM CST - Leny Cherry  Thoracic or lumbosacral  Disturbance of skin sensation (358399631): Onset on 11/23/2010 at 35 years.  Resolved.  Lumbago (941088801): Onset on 9/22/2010 at 35 years.  Resolved.  Pain in limb (676454587): Onset on 9/7/2010 at 35 years.  Resolved.  Chronic sinusitis, unspecified (12110113): Onset on 2/16/2010 at 35 years.  Resolved.  Other Disorders of Menstruation and Other Abnormal Bleeding from Female Genital Tract (626.8):  Resolved.  Uterine Prolapse without Mention of Vaginal Wall Prolapse (618.1):  Resolved.  Pregnancy:  Resolved.  Tobacco Use Disorder (305.1):   Resolved.  Patellar tendinitis (32666404):  Resolved.  Pes anserinus bursitis (718746578):  Resolved.  Pilonidal cyst with abscess (315059677):  Resolved.  Pregnancy (007232490):  Resolved in  at 28 years.  Pregnancy (985775321):  Resolved in  at 29 years.  Lipidemia (53673056):  Resolved.  Knee pain (42299561):  Resolved.  Hip pain (72304977):  Resolved on 3/7/2019 at 44 years.  Facet syndrome (722719338):  Resolved.  Comments:  2017 CST 9:50 AM Leny León  Lumbar  Lumbosacral spondylosis (334066478):  Resolved.  Leg pain (044237802):  Resolved.  Patellofemoral syndrome (6639074817):  Resolved.  Dyslipidemia (0687971796):  Resolved.  Plantar fasciitis (381004219):  Resolved.  History of tobacco use (3095452142):  Resolved.  Dysfunctional uterine bleeding (41732874):  Resolved.  Pilonidal cyst with abscess (995582789):  Resolved.  Uterine prolapse (94468203):  Resolved.  H. pylori infection (4890978261):  Resolved.   Family History:    Cardiac pacemaker  Father (Jacobo)  Comments:  2017 10:17 AM CST Leny Francisco  Irregular heart beat  Diabetes mellitus  Mother (Bianka, )  Heart disease  Father (Jacobo)  Comments:  2017 10:38 AM CST Leny Francisco  Bypass surgery at the age of 40.    2017 10:16 AM CST Leny Francisco  Irregular heart beat  Grandfather (P)  Comments:  2017 10:35 AM CST Leny Francisco  Heart attack at the age of 50.  Grandmother (P) ()  Comments:  2017 10:35 AM Leny León  Heart attack at the age of 30.  Mother (Bianka, )  Comments:  2017 10:16 AM CST Leny Francisco  Bypass graft  High blood pressure  Father (Jacobo)  Mother (Bianka, )  CA - Cancer  Mother (Bianka, )  MS - Multiple sclerosis  Sister  Mother (Bianka, )  Aunt (M)  Colon Polyps  Mother (Bianka, )  Kidney Disease  Mother (Bianka, )  Comments:  2017 10:37 AM Leny León  Kidney failure  Colon cancer  Grandfather  (M) ()  Great Uncle (P) ()  Great Grandfather (M)     Procedure history:    Cardiac computed tomography for calcium scoring (SNOMED CT 5063698369) on 2017 at 42 Years.  Comments:  2017 1:58 PM CST - Shantel Pizano  Total Agatston calcium score is 8.  THR - Total hip replacement (SNOMED CT 152379176) in  at 39 Years.  Comments:  2017 10:58 AM CST - Onelia Espino  left  Pilonidal cyst (SNOMED CT 1657727415) on 2012 at 37 Years.  Comments:  2017 10:41 AM CST - Leny Cherry  Excision.  No abscess.  Nerve conduction study (SNOMED CT 88379818) on 2010 at 35 Years.  Cystoscopy (SNOMED CT 10258280) on 2009 at 34 Years.  Hysterectomy (SNOMED CT 266097034) on 2007 at 32 Years.  Comments:  2017 10:44 AM CST - Leny Cherry  Right salpingo-oophorectomy.   Social History:        Alcohol Assessment            1 drink every 3 months or more.      Tobacco Assessment            Former smoker, quit more than 30 days ago, Cigarettes, Started age 13 Years.  Stopped age 33 Years.      Employment and Education Assessment            Employed, Work/School description: CMA.            Employed, Work/School description: CMA.      Exercise and Physical Activity Assessment            Exercise frequency: 2-3 x per wk..        Physical Examination   Vital Signs   2020 8:24 AM CST Temperature Tympanic 97.2 DegF  LOW    Peripheral Pulse Rate 99 bpm    HR Method Electronic    Systolic Blood Pressure 111 mmHg    Diastolic Blood Pressure 71 mmHg    Mean Arterial Pressure 84 mmHg    BP Method Electronic      Measurements from flowsheet : Measurements   2020 8:24 AM CST Height Measured - Standard 63 in    Weight Measured - Standard 136.2 lb    BSA 1.66 m2    Body Mass Index 24.12 kg/m2      General:  Alert and oriented, No acute distress, Upper extremity CMS intact she has good range of motion of her neck but flexion and rotation right reproduces and makes her symptoms worse.   But she also has some symptoms without neck movement.  She seems to have some right anterior chest wall tenderness noted.  .    Respiratory:  Lungs are clear to auscultation, Respirations are non-labored.    Cardiovascular:  Normal rate, Regular rhythm, Normal peripheral perfusion, No edema.    Gastrointestinal:  Soft, Non-tender.    Integumentary:  No rash.    Neurologic:  Alert, Oriented, No focal deficits, Cranial Nerves II-XII are grossly intact.       Review / Management   Results review:  Lab results   2/26/2020 8:49 AM CST WBC 7.3    RBC 4.53    Hgb 14.2 g/dL    Hct 40.5 %    MCV 89 fL    MCH 31.3 pg    MCHC 35.1 g/dL    RDW 13.0 %    Platelet 247    MPV 10.7 fL    Lymphocytes 31.8 %    Abs Lymphocytes 2.3    Neutrophils 60.1 %    Abs Neutrophils 4.4    Monocytes 6.7 %    Abs Monocytes 0.5    Eosinophils 1.1 %    Abs Eosinophils 0.1    Basophils 0.3 %    Abs Basophils 0.0    Sed Rate 6 mm/hr    D-Dimer 0.63   .    Radiology results   Computed tomography, Reveals no acute disease process      Impression and Plan   Diagnosis     Acute cervical radiculopathy (OTW65-FG M54.12).     Chest pain (KNJ43-QC R07.9).     D-dimer, elevated (WIM42-GX R79.89).     Course:  Not progressing as expected.    Plan:  Patient with most likely cervical radiculopathy although a bit unusual given the lack of response to prednisone continued problems with symptoms no hard neurologic findings.  We will trial physical therapy initially.  May look at advanced imaging of her neck if she is not improving.  Call if she worsens in any way  .    Patient Instructions:       Counseled: Patient, Regarding diagnosis, Regarding treatment, Regarding medications, Activity.

## 2022-02-16 NOTE — TELEPHONE ENCOUNTER
---------------------  From: Noris Wylie CMA (eRx Pool (32224_[facility]))   To: [facility] Message Pool (32224_[place]);     Sent: 10/6/2021 4:07:45 PM CDT  Subject: FW: Medication Management   Due Date/Time: 10/7/2021 10:18:00 AM CDT     Last visit 7/28/21 f/u ER  Last filled 9/10/21 #60        ** Submitted: **  Order:atorvastatin (atorvastatin 40 mg oral tablet)  1 tab(s)  Oral  daily  Qty:  30 tab(s)        Days Supply:  30        Refills:  0          Substitutions Allowed     Route To [facility] #[id number]    Signed by Noris Wylie CMA  10/6/2021 9:07:00 PM [facility]    ** Submitted: **  Complete:atorvastatin (atorvastatin 40 mg oral tablet)   Signed by Noris Wylie CMA  10/6/2021 9:07:00 PM [facility]    ** Not Approved:  **  atorvastatin (ATORVASTATIN 40MG TABLETS)  TAKE 1 TABLET BY MOUTH DAILY  Qty:  30 tab(s)        Days Supply:  30        Refills:  0          Substitutions Allowed     Route To [facility] #[id number]   Signed by Noris Wylie CMA            ** Submitted: **  Order:amLODIPine (amLODIPine 5 mg oral tablet)  1 tab(s)  Oral  daily  Qty:  30 tab(s)        Days Supply:  30        Refills:  0          Substitutions Allowed     Route To [facility] #[id number]    Signed by Noris Wylie CMA  10/6/2021 9:07:00 PM [facility]    ** Submitted: **  Complete:amLODIPine (amLODIPine 5 mg oral tablet)   Signed by Noris Wylie CMA  10/6/2021 9:07:00 PM [facility]    ** Not Approved:  **  amLODIPine (AMLODIPINE BESYLATE 5MG TABLETS)  TAKE 1 TABLET BY MOUTH DAILY  Qty:  30 tab(s)        Days Supply:  30        Refills:  0          Substitutions Allowed     Route To [facility] #[id number]   Signed by Noris Wylie CMA            ------------------------------------------  From: [facility] #[id number]  To: Augustin Chatman MD  Sent: October 6, 2021 10:18:16 AM CDT  Subject: Medication Management  Due: October 2, 2021 3:22:27 PM CDT     ** On Hold Pending  Signature **     Dispensed Drug: amLODIPine (amLODIPine 5 mg oral tablet), TAKE 1 TABLET BY MOUTH DAILY  Quantity: 30 tab(s)  Days Supply: 30  Refills: 0  Substitutions Allowed  Notes from Pharmacy:     ** On Hold Pending Signature **     Dispensed Drug: atorvastatin (atorvastatin 40 mg oral tablet), TAKE 1 TABLET BY MOUTH DAILY  Quantity: 30 tab(s)  Days Supply: 30  Refills: 0  Substitutions Allowed  Notes from Pharmacy:     ** On Hold Pending Signature **     Dispensed Drug: celecoxib (celecoxib 100 mg oral capsule), TAKE 1 CAPSULE BY MOUTH TWICE DAILY  Quantity: 60 cap(s)  Days Supply: 30  Refills: 0  Substitutions Allowed  Notes from Pharmacy:  ---------------------------------------------------------------  From: Anum Gama CMA (TFS Message Pool (32224_[facility]))   To: Augustin Chatman MD;     Sent: 10/6/2021 4:32:22 PM CDT  Subject: FW: Medication Management   Due Date/Time: 10/7/2021 10:18:00 AM CDT---------------------  From: Augustin Chatman MD   To: [facility] #[id number]    Sent: 10/6/2021 4:49:58 PM CDT  Subject: FW: Medication Management     ** Submitted: **  Complete:celecoxib (celecoxib 100 mg oral capsule)   Signed by Augustin Chatman MD  10/6/2021 9:49:00 PM [facility]    ** Approved **  celecoxib (CELECOXIB 100MG CAPSULES)  TAKE 1 CAPSULE BY MOUTH TWICE DAILY  Qty:  60 cap(s)        Days Supply:  30        Refills:  0          Substitutions Allowed     Route To Pharmacy - [facility] #[id number]

## 2022-02-16 NOTE — TELEPHONE ENCOUNTER
---------------------  From: Lucian Singleton LPN   To: Augustin Chatman MD;     Sent: 11/20/2021 9:21:13 AM CST  Subject: Covid Test Result     Patient updated regarding negative/normal covid result via voicemail.

## 2022-02-16 NOTE — TELEPHONE ENCOUNTER
---------------------  From: Noris Wylie CMA (eRx Pool (32224_[facility]))   To: [facility] Message Pool (32224_[place]);     Sent: 12/8/2020 8:08:48 AM CST  Subject: FW: Medication Management   Due Date/Time: 12/7/2020 10:36:00 PM CST     Last visit: 9/30/20 gastritis  Last med check: 5/19/20 HTN / Chol  RTC was due in November  Last filled 9/24/20 #60, 0      ------------------------------------------  From: [facility] #[id number]  To: Augustin Chatman MD  Sent: December 6, 2020 10:36:42 PM CST  Subject: Medication Management  Due: December 2, 2020 4:43:08 PM CST     ** On Hold Pending Signature **     Dispensed Drug: celecoxib (celecoxib 100 mg oral capsule), TAKE 1 CAPSULE BY MOUTH TWICE DAILY  Quantity: 60 cap(s)  Days Supply: 30  Refills: 0  Substitutions Allowed  Notes from Pharmacy:  ------------------------------------------will address at todays appointment.

## 2022-02-16 NOTE — TELEPHONE ENCOUNTER
---------------------  From: July Garnett MA (eRx Pool (45824_[facility]))   To: Advanced Practice Provider Pool (03324_[facility]);     Sent: 7/13/2020 1:46:41 PM CDT  Subject: FW: Medication Management   Due Date/Time: 7/13/2020 11:50:00 AM CDT     Medication Refill needing approval    PCP:   CORNEL--OC this week    Medication:   Tizanidine  Last Filled:  6/12/2020    Quantity:  60  Refills:  0    Date of last office visit and reason:   5/19/2020 w/ CORNEL for CDM f/u    Date of last Med Check / Px:   _  Date of last labs pertaining to condition:  _    Note:  _    Return to Clinic order placed?  RTC due Nov 2020    Resource:   [email or web address]  Phone:   _  Fax:  _          ------------------------------------------  From: [facility] #[id number]  To: Roberto Pérez PA-C  Sent: July 12, 2020 11:50:59 AM CDT  Subject: Medication Management  Due: June 24, 2020 10:12:23 PM CDT     ** On Hold Pending Signature **     Dispensed Drug: tiZANidine (tiZANidine 4 mg oral tablet), TAKE 2 TABLETS BY MOUTH EVERY 8 HOURS AS NEEDED FOR MUSCLE PAIN  Quantity: 60 tab(s)  Days Supply: 10  Refills: 0  Substitutions Allowed  Notes from Pharmacy:  ---------------------------------------------------------------  From: Kate Carson (Advanced Practice Provider Pool (26324_[facility]))   To: eRx Pool (75724_[place]);     Sent: 7/13/2020 2:19:44 PM CDT  Subject: RE: Medication Management     ok to send #60 tabs with no refill---------------------  From: July Garnett MA   To: [facility] #[id number]    Sent: 7/13/2020 2:45:09 PM CDT  Subject: RE: Medication Management     ** Submitted: **  Order:tiZANidine (tiZANidine 4 mg oral tablet)  2 tab(s)  Oral  q8 hrs  Qty:  60 tab(s)        Days Supply:  10        Refills:  0          Substitutions Allowed     PRN  AS NEEDED FOR MUSCLE PAIN      Route To Pharmacy - [facility] #[id number]    Signed by July Garnett MA  7/13/2020 7:44:00 PM [facility]    **  Submitted: **  Complete:tiZANidine (tiZANidine 4 mg oral tablet)   Signed by July Garnett MA  7/13/2020 7:45:00 PM [facility]    ** Not Approved:  **  tiZANidine (TIZANIDINE 4MG TABLETS)  TAKE 2 TABLETS BY MOUTH EVERY 8 HOURS AS NEEDED FOR MUSCLE PAIN  Qty:  60 tab(s)        Days Supply:  10        Refills:  0          Substitutions Allowed     [facility] #[id number]   Signed by July Garnett MA---------------------  From: July Garnett MA (eRx Pool (32224_[facility]))   To: "[facility] Message Pool ([address] [place] [address]);     Sent: 7/13/2020 2:45:35 PM CDT  Subject: FW: Medication Management     Please advise re: further refills.---------------------  From: Sarah Chapa CMA (TFS Message Pool ([facility]))   To: Augustin Chatman MD;     Sent: 7/13/2020 3:04:03 PM CDT  Subject: FW: Medication Management---------------------  From: Augustin Chatman MD   To: "[facility] Message Pool ([facility]);     Sent: 7/14/2020 1:03:46 PM CDT  Subject: RE: Medication Management     looks like Kate approved ref---------------------  From: Rere Case (TFS Message Pool (32224[facility]))   To: Augustin Chatman MD;     Sent: 7/14/2020 1:18:31 PM CDT  Subject: FW: Medication Management     NCB approved 1 month supply, sent to you for further refills.---------------------  From: Augustin Chatman MD   To: "[facility] Message Pool (80224[facility]);     Sent: 7/14/2020 1:30:03 PM CDT  Subject: RE: Medication Management     ok for 1 ref

## 2022-02-16 NOTE — TELEPHONE ENCOUNTER
---------------------  From: Stephany Dugan CMA (eRx Pool (32224_[facility]))   To: BEKIZ Message Pool ([address]_[place]);     Sent: 11/30/2020 4:05:56 PM CST  Subject: FW: Medication Management   Due Date/Time: 11/30/2020 1:27:00 PM CST     LV: 9/30/20 Gastritis  Med check: 5/19/20  Labs: 5/19/20  RTC: 6 months ( due now for visit)  Last filled: 11/10/20 by [facility] for 10 tabs. 0 refills  Please advise if ok for refills      ------------------------------------------  From: [facility] [employer] #[id number]  To: Augustin Chatman MD  Sent: November 29, 2020 1:27:58 PM CST  Subject: Medication Management  Due: November 26, 2020 5:03:39 PM CST     ** On Hold Pending Signature **     Dispensed Drug: tiZANidine (tiZANidine 4 mg oral tablet), TAKE 2 TABLETS BY MOUTH EVERY 8 HOURS AS NEEDED FOR MUSCLE PAIN  Quantity: 60 tab(s)  Days Supply: 10  Refills: 0  Substitutions Allowed  Notes from Pharmacy:  ---------------------------------------------------------------  From: Lesa Limon (BEKIZ Message Pool (32224_[facility]))   To: Augustin Chatman MD;     Sent: 12/1/2020 10:21:19 AM CST  Subject: FW: Medication Management   Due Date/Time: 11/30/2020 1:27:00 PM CST---------------------  From: Augustin Chatman MD   To: [employer] #[id number]    Sent: 12/1/2020 10:50:53 AM CST  Subject: FW: Medication Management     ** Submitted: **  Complete:tiZANidine (tiZANidine 4 mg oral tablet)   Signed by Augustin Chatman MD  12/1/2020 4:50:00 PM [facility] [place]    ** Approved **  tiZANidine (TIZANIDINE 4MG TABLETS)  TAKE 2 TABLETS BY MOUTH EVERY 8 HOURS AS NEEDED FOR MUSCLE PAIN  Qty:  60 tab(s)        Days Supply:  10        Refills:  0          Substitutions Allowed     [id number]

## 2022-02-16 NOTE — LETTER
(Inserted Image. Unable to display)   October 08, 2019      YESSENIA RIDDLE      [address] [place], [place] [id number]        Dear YESSENIA,    Thank you for selecting [facility] for your healthcare needs.  Below you will find the results of the recent tests done at our clinic.     All labs acceptable.      Result Name Current Result Previous Result Reference Range   Cholesterol (mg/dL)  133 10/7/2019 ((H)) 228 12/5/2017  - <200   HDL (mg/dL)  58 10/7/2019  62 12/5/2017 >50 -    LDL  61 10/7/2019 ((H)) 133 12/5/2017    Triglyceride (mg/dL)  48 10/7/2019 ((H)) 193 12/5/2017  - <150       Please contact me or my assistant at [phone] if you have any questions.     Sincerely,        Augustin Chatman MD        What do your labs mean?  Below is a glossary of commonly ordered labs:  LDL   Bad Cholesterol   HDL   Good Cholesterol  AST/ALT   Liver Function   Cr/Creatinine   Kidney Function  Microalbumin   Kidney Function  BUN   Kidney Function  PSA   Prostate    TSH   Thyroid Hormone  HgbA1c   Diabetes Test   Hgb (Hemoglobin)   Red Blood Cells

## 2022-02-16 NOTE — PROGRESS NOTES
Chief Complaint    cough and congestion for the past few weeks, sore throat, headache, chills, Kids had the flu within the past month. Low grade fever.  History of Present Illness      Chief complaint as above reviewed and confirmed with patient.  Pt presents to the clinic with concerns re: cough.  she had influenza along with several others in the home about 2 weeks ago.  got some better but cough persists, facial pain, pressure, thick green discharge HA and persistent low fevers.  Tooth pain.  Review of Systems      Review of systems is negative with the exception of those noted in HPI          Physical Exam   Vitals & Measurements    T: 99.6   F (Tympanic)  HR: 88(Peripheral)  RR: 20  BP: 142/78  SpO2: 99%     HT: 63 in  WT: 137 lb  BMI: 24.27           Vitals as above per nursing documentation           Constitutional : nad appears well          Ears: ears patent B, TMS intact, noninjected           Nose: nasal mucosa is ededmatous. purulent discharge , TTP of the maxillary and frontal sinuses           Throat: pharynx is nonerythematous, no tonsillar hypertrophy, no exudate           Neck: neck supple, no adenopathy, no thyromegaly, no rigidity           Lungs: lungs CTA', no Wheezes, rhonchi or rales           Heart: heart RRR, nl S1, S2 no murmur           skin:  No rashes              Assessment/Plan       1. Sinusitis (J32.9)         augmentin as ordered   Push fluids, rest and ibuprofen or tylenol for comfort.  Pt instructed to return to clinic for persistent or worsening symptoms.                           Orders:         amoxicillin-clavulanate, = 1 tab(s), Oral, q12 hrs, x 10 day(s), # 20 tab(s), 0 Refill(s), Type: Acute, Pharmacy: [email or web address] #[id number], 1 tab(s) Oral q12 hrs,x10 day(s), (Ordered)  Patient Information     Name:YESSENIA RIDDLE      Address:      [address] [place] [id number]     Sex:Female     YOB: 1975     [phone]     Emergency  Contact:MILLY RIDDLE     MRN:[id number]     FIN:[id number]     Location:[facility]     Date of Service:02/02/2020      Primary Care Physician:       Augustin Chatman MD, [phone]      Attending Physician:       Maegan SANCHEZ, Malu BUCK, [phone]  Problem List/Past Medical History    Ongoing     Abdominal pain     Allergic rhinitis, unspecified     Family history of heart attack     Gastrocnemius equinus     GERD without esophagitis     Hyperlipidemia, unspecified     Hypertension goal BP (blood pressure) < 130/80     Lumbosacral neuritis     Lumbosacral spondylosis without myelopathy     Myofascial pain     Nonspecific reaction to tuberculin skin test without active tuberculosis     Obesity     Sleep disturbance    Historical     Back problem     Chronic sinusitis, unspecified     Disorder of sacrum     Disturbance of skin sensation     Dysfunctional uterine bleeding     Dyslipidemia     Facet syndrome       Comments: Lumbar     H. pylori infection     Hip pain     History of tobacco use     Knee pain     Leg pain     Lipidemia     Lumbago     Lumbosacral spondylosis     Myalgia     Neuritis       Comments: Thoracic or lumbosacral     Other Disorders of Menstruation and Other Abnormal Bleeding from Female Genital Tract     Pain in joint involving lower leg     Pain in limb     Patellar tendinitis     Patellofemoral syndrome     Pes anserinus bursitis     Pilonidal cyst with abscess     Pilonidal cyst with abscess     Plantar fasciitis     Pregnancy     Pregnancy     Pregnancy     Tobacco Use Disorder     Uterine prolapse     Uterine Prolapse without Mention of Vaginal Wall Prolapse  Procedure/Surgical History     Cardiac computed tomography for calcium scoring (11/30/2017)      Comments: Total Agatston calcium score is 8..     THR - Total hip replacement (2015)      Comments: left.     Excision of pilonidal cyst simple (07/17/2012)     Pilonidal cyst (07/17/2012)      Comments: Excision.   No abscess..     Nerve conduction study (09/02/2010)     Cystoscopy (09/14/2009)     Hysterectomy (06/07/2007)     Hysterectomy (06/07/2007)      Comments: Right salpingo-oophorectomy..  Medications    amLODIPine 5 mg oral tablet, 5 mg= 1 tab(s), Oral, daily, 1 refills    aspirin 81 mg oral tablet, 81 mg= 1 tab(s), Oral, daily    atorvastatin 40 mg oral tablet, 1 tab(s), Oral, daily, 1 refills    Augmentin 875 mg-125 mg oral tablet, 1 tab(s), Oral, q12 hrs    Fish Oil 1000 mg oral capsule, 1000 mg= 1 cap(s), Oral, daily  Allergies    Metal    No Known Medication Allergies  Social History    Smoking Status - 11/08/2019     Former smoker     Alcohol      1 drink every 3 months or more., 11/24/2017     Employment/School      Employed, Work/School description: [employer]., 11/24/2017      Employed, Work/School description: [employer]., 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 Stopped age 33 Years., 11/22/2017  Family History    CA - Cancer: Mother.    Cardiac pacemaker: Father.    Colon Polyps: Mother.    Colon cancer: Grandfather (M), Great Grandfather (M) and Great Uncle (P).    Diabetes mellitus: Mother.    Heart disease: Mother, Father, Grandfather (P) and Grandmother (P).    High blood pressure: Mother and Father.    Kidney Disease: Mother.    MS - Multiple sclerosis: Mother, Sister and Aunt (M).    Brother: History is negative    Sister: History is negative  Immunizations      Vaccine Date Status          influenza virus vaccine, inactivated 10/08/2019 Given          influenza virus vaccine, inactivated 09/25/2013 Recorded          influenza virus vaccine, inactivated 10/02/2012 Recorded          tetanus/diphth/pertuss (Tdap) adult/adol 10/02/2012 Recorded          influenza virus vaccine, inactivated 09/21/2009 Recorded          hepatitis B pediatric vaccine 05/04/2006 Recorded          hepatitis B  pediatric vaccine 03/27/2006 Recorded

## 2022-02-16 NOTE — TELEPHONE ENCOUNTER
---------------------  From: Lucian Singleton LPN (eRx Pool (32224_[facility]))   To: [facility]NEL Message Pool ([address]224_[place]);     Sent: 7/9/2021 10:08:53 AM CDT  Subject: FW: Medication Management   Due Date/Time: 7/9/2021 5:23:00 PM CDT     Medication Refill Approval Required    PCP:   _[facility]    Medication:   _ Tizanidine 4mg  Last Filled:  _ 6-8-21   Quantity:  _ 60 Refills:  _ 0    CSA on file?   _     Return to Clinic order placed?  _ June '21    Date of last office visit and reason:   _ 2-24-21, leg pain    Date of last labs pertaining to condition:  _    Note:  _    Resource:   _    Phone:   _        ** Submitted: **  Order:celecoxib (celecoxib 100 mg oral capsule)  1 cap(s)  Oral  bid  Qty:  60 cap(s)        Days Supply:  30        Refills:  0          Substitutions Allowed     Route To [facility] #[id number]    Signed by Lucian Singleton LPN  7/9/2021 3:05:00 PM [place]    ** Submitted: **  Complete:celecoxib (celecoxib 100 mg oral capsule)   Signed by Lucian Singleton LPN  7/9/2021 3:05:00 PM [place]    ** Not Approved:  **  celecoxib (CELECOXIB 100MG CAPSULES)  TAKE 1 CAPSULE BY MOUTH TWICE DAILY  Qty:  60 cap(s)        Days Supply:  30        Refills:  0          Substitutions Allowed     [facility] To [facility] #[id number]   Signed by Lucian Singleton LPN            ** Submitted: **  Order:amLODIPine (amLODIPine 5 mg oral tablet)  1 tab(s)  Oral  daily  Qty:  30 tab(s)        Refills:  0          Substitutions Allowed     Route To [facility] #[id number]    Signed by Lucian Singleton LPN  7/9/2021 3:05:00 PM [place]    ** Submitted: **  Complete:amLODIPine (amLODIPine 5 mg oral tablet)   Signed by Lucian Singleton LPN  7/9/2021 3:05:00 PM UTC    ** Not Approved:  **  amLODIPine (AMLODIPINE BESYLATE 5MG TABLETS)  TAKE 1 TABLET BY MOUTH DAILY  Qty:  90 tab(s)        Days Supply:  90        Refills:  0          Substitutions Allowed     Route To Pharmacy - [facility] #[id number]   Signed by Lucian Singleton LPN            ------------------------------------------  From: [facility] #[id number]  To: Augustin Chatman MD  Sent: July 8, 2021 5:23:07 PM CDT  Subject: Medication Management  Due: June 4, 2021 8:26:15 PM CDT     ** On Hold Pending Signature **     Dispensed Drug: celecoxib (celecoxib 100 mg oral capsule), TAKE 1 CAPSULE BY MOUTH TWICE DAILY  Quantity: 60 cap(s)  Days Supply: 30  Refills: 0  Substitutions Allowed  Notes from Pharmacy:     ** On Hold Pending Signature **     Dispensed Drug: amLODIPine (amLODIPine 5 mg oral tablet), TAKE 1 TABLET BY MOUTH DAILY  Quantity: 90 tab(s)  Days Supply: 90  Refills: 0  Substitutions Allowed  Notes from Pharmacy:     ** On Hold Pending Signature **     Dispensed Drug: tiZANidine (tiZANidine 4 mg oral tablet), TAKE 2 TABLETS BY MOUTH EVERY 8 HOURS AS NEEDED FOR MUSCLE PAIN  Quantity: 60 tab(s)  Days Supply: 10  Refills: 0  Substitutions Allowed  Notes from Pharmacy:  ---------------------------------------------------------------  From: Rere Case ([employer] Message Pool (32224_[facility])   To: Augustin Chatman MD;     Sent: 7/12/2021 7:47:36 PM CDT  Subject: FW: Medication Management   Due Date/Time: 7/9/2021 5:23:00 PM CDT---------------------  From: Augustin Chatman MD   To: [facility] [employer] #[id number]    Sent: 7/13/2021 7:28:53 AM CDT  Subject: FW: Medication Management     ** Submitted: **  Complete:tiZANidine (tiZANidine 4 mg oral tablet)   Signed by Augustin Chatman MD  7/13/2021 12:28:00 PM [facility]    ** Approved with modifications: **  tiZANidine (TIZANIDINE 4MG TABLETS)  TAKE 2 TABLETS BY MOUTH EVERY 8 HOURS AS NEEDED FOR MUSCLE PAIN  Qty:  60 tab(s)        Days Supply:  10        Refills:  0          Substitutions Allowed     Route To Pharmacy - [facility] #[id number]

## 2022-02-16 NOTE — NURSING NOTE
Depression Screening Entered On:  5/19/2020 10:22 AM CDT    Performed On:  5/19/2020 10:22 AM CDT by Dallas Rodriges CMA               Depression Screening   Little Interest - Pleasure in Activities :   Not at all   Feeling Down, Depressed, Hopeless :   Not at all   Initial Depression Screen Score :   0    Poor Appetite or Overeating :   Not at all   Trouble Falling or Staying Asleep :   Nearly every day   Feeling Tired or Little Energy :   Not at all   Feeling Bad About Yourself :   Not at all   Trouble Concentrating :   Not at all   Moving or Speaking Slowly :   Not at all   Thoughts Better Off Dead or Hurting Self :   Not at all   JOE Difficulty with Work, Home, Others :   Not difficult at all   Detailed Depression Screen Score :   3    Total Depression Screen Score :   3    Dallas Rodriges CMA - 5/19/2020 10:22 AM CDT

## 2022-02-16 NOTE — LETTER
(Inserted Image. Unable to display)   March 12, 2019      YESSENIA RIDDLE  [address] [place], [place] [id number]        Dear YESSENIA,      Thank you for selecting [facility] for your healthcare needs.     Your recent lab results were all normal.       Result Name Current Result   Pinworm Prep  See comment 3/7/2019   Trichrome 1  See comment 3/8/2019       Please contact me or my assistant at [phone] if you have any questions or concerns.     Sincerely,        Augustin Chatman MD

## 2022-02-16 NOTE — TELEPHONE ENCOUNTER
---------------------  From: Noris Wylie CMA (eRx Pool (32224_[facility]))   To: [employer] Message Pool ([address]224_[place]);     Sent: 9/24/2020 9:09:47 AM CDT  Subject: FW: Medication Management   Due Date/Time: 9/25/2020 5:55:00 AM CDT     Medication Refill needing approval    PCP:   [facility]    Medication:   Celebrex  Last Filled:  5/29/20    Quantity:  60  Refills:  3  CSA on file?   n/a     Date of last office visit and reason:   5/19/20; HTN / Cholesterol  Date of last labs pertaining to condition:  5/19/20    Return to Clinic order placed?  yes; November        ------------------------------------------  From: [facility] #[id number]  To: Augustin Chatman MD  Sent: September 24, 2020 5:55:24 AM CDT  Subject: Medication Management  Due: September 9, 2020 4:21:06 PM CDT     ** On Hold Pending Signature **     Dispensed Drug: celecoxib (celecoxib 100 mg oral capsule), TAKE 1 CAPSULE BY MOUTH TWICE DAILY  Quantity: 60 cap(s)  Days Supply: 30  Refills: 0  Substitutions Allowed  Notes from Pharmacy:  ---------------------------------------------------------------  From: Rere Case ([employer] Message Pool (32224_[facility]))   To: Augustin Chatman MD;     Sent: 9/24/2020 9:29:59 AM CDT  Subject: FW: Medication Management   Due Date/Time: 9/25/2020 5:55:00 AM CDT---------------------  From: Augustin Chatman MD   To: [employer] [facility] #[id number]    Sent: 9/24/2020 9:35:59 AM CDT  Subject: FW: Medication Management     ** Submitted: **  Complete:celecoxib (CeleBREX 100 mg oral capsule)   Signed by Augustin Chatman MD  9/24/2020 2:35:00 PM [facility] [[email or web address]]    ** Approved **  celecoxib (CELECOXIB 100MG CAPSULES)  TAKE 1 CAPSULE BY MOUTH TWICE DAILY  Qty:  60 cap(s)        Days Supply:  30        Refills:  0          Substitutions Allowed     Route To Pharmacy - [facility] #[id number]

## 2022-02-16 NOTE — TELEPHONE ENCOUNTER
Entered by Noris Wylie CMA on February 11, 2019 6:37:25 PM CST  ---------------------  From: Noris Wylie CMA   To: [facility] [employer] [facility] [id number]    Sent: 2/11/2019 6:37:24 PM CST  Subject: Medication Management     ** Not Approved: Request responded to by other means **  Freetext Med (AMLODIPINE BESYLATE 2.5MG TABLETS)  TAKE 1 TABLET BY MOUTH DAILY  Qty:  30 tab(s)        Days Supply:  30        Refills:  0          Substitutions Allowed     Route To [facility] [facility] [place] [id number]   Signed by Noris Wylie CMA            ** Submitted: **  Order:atorvastatin (atorvastatin 40 mg oral tablet)  1 tab(s)  Oral  daily  Qty:  30 tab(s)        Days Supply:  30        Refills:  0          Substitutions Allowed     Route To [facility] [employer] [place] [address]    Signed by Noris Wylie CMA  2/11/2019 6:36:00 PM    ** Submitted: **  Complete:atorvastatin (atorvastatin 40 mg oral tablet)   Signed by Noris Wylie CMA  2/11/2019 6:36:00 PM    ** Not Approved:  **  atorvastatin (ATORVASTATIN 40MG TABLETS)  TAKE 1 TABLET BY MOUTH DAILY  Qty:  30 tab(s)        Days Supply:  30        Refills:  0          YOHANNES     Route To [facility] [place] [address]   Signed by Noris Wylie CMA            ** Submitted: **  Order:omeprazole (omeprazole 20 mg oral delayed release capsule)  1 cap(s)  Oral  bid  Qty:  60 cap(s)        Days Supply:  30        Refills:  0          Substitutions Allowed     Route To [facility] [employer] [place] [id number]    Signed by Noris Wylie CMA  2/11/2019 6:35:00 PM    ** Submitted: **  Complete:omeprazole (omeprazole 20 mg oral delayed release capsule)   Signed by Noris Wylie CMA  2/11/2019 6:36:00 PM    ** Not Approved:  **  omeprazole (OMEPRAZOLE 20MG CAPSULES)  TAKE 1 CAPSULE BY MOUTH TWICE DAILY  Qty:  60 cap(s)        Days Supply:  30        Refills:  0          YOHANNES     Route To [facility] [place] [address]   Signed by Dejan AGRAWAL  Noris            ------------------------------------------  From: [employer] [id number]  To: Augustin Chatman MD  Sent: February 11, 2019 9:18:03 AM CST  Subject: Medication Management  Due: February 12, 2019 9:18:03 AM CST    ** On Hold Pending Signature **  Drug: atorvastatin (atorvastatin 40 mg oral tablet)  1 TAB(S) ORAL DAILY  Quantity: 30 tab(s)     Days Supply: 0         Refills: 0  YOHANNES  Notes from Pharmacy:     Dispensed Drug: atorvastatin (atorvastatin 40 mg oral tablet)  TAKE 1 TABLET BY MOUTH DAILY  Quantity: 30 tab(s)     Days Supply: 30        Refills: 0  Substitutions Allowed  Notes from Pharmacy:     ** On Hold Pending Signature **  Drug: amLODIPine (amLODIPine 2.5 mg oral tablet)  1 TAB(S) ORAL DAILY  Quantity: 30 tab(s)     Days Supply: 0         Refills: 0  YOHANNES  Notes from Pharmacy: Pt due for appt for further refills    Dispensed Drug: AMLODIPINE BESYLATE 2.5MG TABLETS  TAKE 1 TABLET BY MOUTH DAILY  Quantity: 30 tab(s)     Days Supply: 30        Refills: 0  YOHANNES  Notes from Pharmacy:     ** On Hold Pending Signature **  Drug: omeprazole (omeprazole 20 mg oral delayed release capsule)  1 CAP(S) ORAL BID,X30 DAY(S)  Quantity: 60 cap(s)     Days Supply: 0         Refills: 0  Substitutions Allowed  Notes from Pharmacy:     Dispensed Drug: omeprazole (omeprazole 20 mg oral delayed release capsule)  TAKE 1 CAPSULE BY MOUTH TWICE DAILY  Quantity: 60 cap(s)     Days Supply: 30        Refills: 0  Substitutions Allowed  Notes from Pharmacy:   ------------------------------------------Med Refill      Date of last office visit and reason:  1/15/19; abdominal pain      Date of last Med Check / Px:   7/12/18  Date of last labs pertaining to med:  9/25/18    RTC order in chart:  yes; due end of this month    For Protocol refill, has patient been contacted:  n/a - msg sent to pharmacy

## 2022-08-23 ENCOUNTER — TRANSFERRED RECORDS (OUTPATIENT)
Dept: HEALTH INFORMATION MANAGEMENT | Facility: CLINIC | Age: 47
End: 2022-08-23